# Patient Record
Sex: MALE | Race: OTHER | HISPANIC OR LATINO | Employment: PART TIME | URBAN - NONMETROPOLITAN AREA
[De-identification: names, ages, dates, MRNs, and addresses within clinical notes are randomized per-mention and may not be internally consistent; named-entity substitution may affect disease eponyms.]

---

## 2017-01-04 ENCOUNTER — TRANSCRIBE ORDERS (OUTPATIENT)
Dept: ADMINISTRATIVE | Facility: HOSPITAL | Age: 57
End: 2017-01-04

## 2017-01-04 ENCOUNTER — HOSPITAL ENCOUNTER (OUTPATIENT)
Dept: RADIOLOGY | Facility: HOSPITAL | Age: 57
Discharge: HOME | End: 2017-01-04
Payer: MEDICARE

## 2017-01-04 DIAGNOSIS — M54.50 LOW BACK PAIN: ICD-10-CM

## 2017-01-04 PROCEDURE — 72114 X-RAY EXAM L-S SPINE BENDING: CPT

## 2017-01-06 ENCOUNTER — ALLSCRIPTS OFFICE VISIT (OUTPATIENT)
Dept: OTHER | Facility: OTHER | Age: 57
End: 2017-01-06

## 2017-01-10 ENCOUNTER — HOSPITAL ENCOUNTER (OUTPATIENT)
Dept: SLEEP CENTER | Facility: HOSPITAL | Age: 57
Discharge: HOME | End: 2017-01-10
Payer: MEDICARE

## 2017-01-10 ENCOUNTER — GENERIC CONVERSION - ENCOUNTER (OUTPATIENT)
Dept: OTHER | Facility: OTHER | Age: 57
End: 2017-01-10

## 2017-01-12 ENCOUNTER — APPOINTMENT (OUTPATIENT)
Dept: PHYSICAL THERAPY | Facility: HOME HEALTHCARE | Age: 57
End: 2017-01-12
Payer: MEDICARE

## 2017-01-12 PROCEDURE — G8978 MOBILITY CURRENT STATUS: HCPCS

## 2017-01-12 PROCEDURE — 97161 PT EVAL LOW COMPLEX 20 MIN: CPT

## 2017-01-12 PROCEDURE — 97110 THERAPEUTIC EXERCISES: CPT

## 2017-01-12 PROCEDURE — 97535 SELF CARE MNGMENT TRAINING: CPT

## 2017-01-12 PROCEDURE — G8979 MOBILITY GOAL STATUS: HCPCS

## 2017-01-19 ENCOUNTER — APPOINTMENT (OUTPATIENT)
Dept: PHYSICAL THERAPY | Facility: HOME HEALTHCARE | Age: 57
End: 2017-01-19
Payer: MEDICARE

## 2017-01-19 PROCEDURE — 97014 ELECTRIC STIMULATION THERAPY: CPT

## 2017-01-19 PROCEDURE — 97110 THERAPEUTIC EXERCISES: CPT

## 2017-01-24 ENCOUNTER — APPOINTMENT (OUTPATIENT)
Dept: PHYSICAL THERAPY | Facility: HOME HEALTHCARE | Age: 57
End: 2017-01-24
Payer: MEDICARE

## 2017-01-24 PROCEDURE — 97110 THERAPEUTIC EXERCISES: CPT

## 2017-01-24 PROCEDURE — 97014 ELECTRIC STIMULATION THERAPY: CPT

## 2017-01-25 ENCOUNTER — ALLSCRIPTS OFFICE VISIT (OUTPATIENT)
Dept: FAMILY MEDICINE CLINIC | Facility: CLINIC | Age: 57
End: 2017-01-25
Payer: MEDICARE

## 2017-01-25 PROCEDURE — 99213 OFFICE O/P EST LOW 20 MIN: CPT | Performed by: PHYSICIAN ASSISTANT

## 2017-01-31 ENCOUNTER — APPOINTMENT (OUTPATIENT)
Dept: PHYSICAL THERAPY | Facility: HOME HEALTHCARE | Age: 57
End: 2017-01-31
Payer: MEDICARE

## 2017-01-31 PROCEDURE — 97140 MANUAL THERAPY 1/> REGIONS: CPT

## 2017-01-31 PROCEDURE — 97110 THERAPEUTIC EXERCISES: CPT

## 2017-01-31 PROCEDURE — 97014 ELECTRIC STIMULATION THERAPY: CPT

## 2017-02-02 ENCOUNTER — APPOINTMENT (OUTPATIENT)
Dept: PHYSICAL THERAPY | Facility: HOME HEALTHCARE | Age: 57
End: 2017-02-02
Payer: MEDICARE

## 2017-02-02 PROCEDURE — 97014 ELECTRIC STIMULATION THERAPY: CPT

## 2017-02-02 PROCEDURE — 97110 THERAPEUTIC EXERCISES: CPT

## 2017-02-02 PROCEDURE — 97140 MANUAL THERAPY 1/> REGIONS: CPT

## 2017-02-08 ENCOUNTER — APPOINTMENT (OUTPATIENT)
Dept: PHYSICAL THERAPY | Facility: HOME HEALTHCARE | Age: 57
End: 2017-02-08
Payer: MEDICARE

## 2017-02-08 PROCEDURE — 97110 THERAPEUTIC EXERCISES: CPT

## 2017-02-08 PROCEDURE — 97140 MANUAL THERAPY 1/> REGIONS: CPT

## 2017-02-08 PROCEDURE — 97014 ELECTRIC STIMULATION THERAPY: CPT

## 2017-02-09 ENCOUNTER — APPOINTMENT (OUTPATIENT)
Dept: PHYSICAL THERAPY | Facility: HOME HEALTHCARE | Age: 57
End: 2017-02-09
Payer: MEDICARE

## 2017-02-10 ENCOUNTER — GENERIC CONVERSION - ENCOUNTER (OUTPATIENT)
Dept: OTHER | Facility: OTHER | Age: 57
End: 2017-02-10

## 2017-02-10 ENCOUNTER — APPOINTMENT (OUTPATIENT)
Dept: PHYSICAL THERAPY | Facility: HOME HEALTHCARE | Age: 57
End: 2017-02-10
Payer: MEDICARE

## 2017-02-10 PROCEDURE — 97110 THERAPEUTIC EXERCISES: CPT

## 2017-02-10 PROCEDURE — 97164 PT RE-EVAL EST PLAN CARE: CPT

## 2017-02-10 PROCEDURE — G8979 MOBILITY GOAL STATUS: HCPCS

## 2017-02-10 PROCEDURE — G8978 MOBILITY CURRENT STATUS: HCPCS

## 2017-02-10 PROCEDURE — 97014 ELECTRIC STIMULATION THERAPY: CPT

## 2017-02-17 ENCOUNTER — ALLSCRIPTS OFFICE VISIT (OUTPATIENT)
Dept: OTHER | Facility: OTHER | Age: 57
End: 2017-02-17

## 2017-02-21 ENCOUNTER — GENERIC CONVERSION - ENCOUNTER (OUTPATIENT)
Dept: OTHER | Facility: OTHER | Age: 57
End: 2017-02-21

## 2017-02-22 ENCOUNTER — ALLSCRIPTS OFFICE VISIT (OUTPATIENT)
Dept: FAMILY MEDICINE CLINIC | Facility: CLINIC | Age: 57
End: 2017-02-22
Payer: MEDICARE

## 2017-02-22 PROCEDURE — 99213 OFFICE O/P EST LOW 20 MIN: CPT | Performed by: PHYSICIAN ASSISTANT

## 2017-03-10 ENCOUNTER — ALLSCRIPTS OFFICE VISIT (OUTPATIENT)
Dept: OTHER | Facility: OTHER | Age: 57
End: 2017-03-10

## 2017-03-16 ENCOUNTER — GENERIC CONVERSION - ENCOUNTER (OUTPATIENT)
Dept: OTHER | Facility: OTHER | Age: 57
End: 2017-03-16

## 2017-03-22 ENCOUNTER — ALLSCRIPTS OFFICE VISIT (OUTPATIENT)
Dept: FAMILY MEDICINE CLINIC | Facility: CLINIC | Age: 57
End: 2017-03-22
Payer: MEDICARE

## 2017-03-22 DIAGNOSIS — S32.000A CLOSED WEDGE COMPRESSION FRACTURE OF LUMBAR VERTEBRA (HCC): ICD-10-CM

## 2017-03-22 DIAGNOSIS — E55.9 VITAMIN D DEFICIENCY: ICD-10-CM

## 2017-03-22 DIAGNOSIS — M51.36 OTHER INTERVERTEBRAL DISC DEGENERATION, LUMBAR REGION: ICD-10-CM

## 2017-03-22 PROCEDURE — 99213 OFFICE O/P EST LOW 20 MIN: CPT | Performed by: PHYSICIAN ASSISTANT

## 2017-03-28 ENCOUNTER — HOSPITAL ENCOUNTER (OUTPATIENT)
Dept: BONE DENSITY | Facility: HOSPITAL | Age: 57
Discharge: HOME | End: 2017-03-28
Payer: MEDICARE

## 2017-03-28 ENCOUNTER — APPOINTMENT (OUTPATIENT)
Dept: LAB | Facility: HOSPITAL | Age: 57
End: 2017-03-28
Payer: MEDICARE

## 2017-03-28 ENCOUNTER — TRANSCRIBE ORDERS (OUTPATIENT)
Dept: ADMINISTRATIVE | Facility: HOSPITAL | Age: 57
End: 2017-03-28

## 2017-03-28 DIAGNOSIS — M51.36 OTHER INTERVERTEBRAL DISC DEGENERATION, LUMBAR REGION: ICD-10-CM

## 2017-03-28 DIAGNOSIS — E55.9 VITAMIN D DEFICIENCY: ICD-10-CM

## 2017-03-28 DIAGNOSIS — S32.000A CLOSED WEDGE COMPRESSION FRACTURE OF LUMBAR VERTEBRA (HCC): ICD-10-CM

## 2017-03-28 LAB — 25(OH)D3 SERPL-MCNC: 16.7 NG/ML (ref 30–100)

## 2017-03-28 PROCEDURE — 82306 VITAMIN D 25 HYDROXY: CPT

## 2017-03-28 PROCEDURE — 36415 COLL VENOUS BLD VENIPUNCTURE: CPT

## 2017-03-28 PROCEDURE — 77080 DXA BONE DENSITY AXIAL: CPT

## 2017-04-20 ENCOUNTER — GENERIC CONVERSION - ENCOUNTER (OUTPATIENT)
Dept: OTHER | Facility: OTHER | Age: 57
End: 2017-04-20

## 2017-05-12 ENCOUNTER — ALLSCRIPTS OFFICE VISIT (OUTPATIENT)
Dept: OTHER | Facility: OTHER | Age: 57
End: 2017-05-12

## 2017-05-12 DIAGNOSIS — I10 ESSENTIAL (PRIMARY) HYPERTENSION: ICD-10-CM

## 2017-05-12 DIAGNOSIS — I51.9 HEART DISEASE: ICD-10-CM

## 2017-05-12 DIAGNOSIS — I25.10 ATHEROSCLEROTIC HEART DISEASE OF NATIVE CORONARY ARTERY WITHOUT ANGINA PECTORIS: ICD-10-CM

## 2017-05-12 DIAGNOSIS — E78.00 PURE HYPERCHOLESTEROLEMIA: ICD-10-CM

## 2017-05-30 ENCOUNTER — ALLSCRIPTS OFFICE VISIT (OUTPATIENT)
Dept: OTHER | Facility: OTHER | Age: 57
End: 2017-05-30

## 2017-05-31 ENCOUNTER — GENERIC CONVERSION - ENCOUNTER (OUTPATIENT)
Dept: OTHER | Facility: OTHER | Age: 57
End: 2017-05-31

## 2017-06-21 ENCOUNTER — ALLSCRIPTS OFFICE VISIT (OUTPATIENT)
Dept: FAMILY MEDICINE CLINIC | Facility: CLINIC | Age: 57
End: 2017-06-21
Payer: MEDICARE

## 2017-06-21 PROCEDURE — 99213 OFFICE O/P EST LOW 20 MIN: CPT | Performed by: PHYSICIAN ASSISTANT

## 2017-07-24 ENCOUNTER — HOSPITAL ENCOUNTER (OUTPATIENT)
Facility: HOSPITAL | Age: 57
Setting detail: OBSERVATION
Discharge: HOME | End: 2017-07-25
Attending: EMERGENCY MEDICINE | Admitting: FAMILY MEDICINE
Payer: MEDICARE

## 2017-07-24 ENCOUNTER — APPOINTMENT (EMERGENCY)
Dept: CT IMAGING | Facility: HOSPITAL | Age: 57
End: 2017-07-24
Payer: MEDICARE

## 2017-07-24 DIAGNOSIS — K62.5 RECTAL BLEEDING: Primary | ICD-10-CM

## 2017-07-24 PROBLEM — R79.89 ELEVATED LACTIC ACID LEVEL: Status: ACTIVE | Noted: 2017-07-24

## 2017-07-24 LAB
ALBUMIN SERPL BCP-MCNC: 3.1 G/DL (ref 3.5–5)
ALP SERPL-CCNC: 94 U/L (ref 46–116)
ALT SERPL W P-5'-P-CCNC: 26 U/L (ref 12–78)
ANION GAP SERPL CALCULATED.3IONS-SCNC: 10 MMOL/L (ref 4–13)
APTT PPP: 34 SECONDS (ref 23–35)
AST SERPL W P-5'-P-CCNC: 13 U/L (ref 5–45)
BACTERIA UR QL AUTO: ABNORMAL /HPF
BASOPHILS # BLD AUTO: 0.11 THOUSANDS/ΜL (ref 0–0.1)
BASOPHILS NFR BLD AUTO: 1 % (ref 0–1)
BILIRUB SERPL-MCNC: 0.2 MG/DL (ref 0.2–1)
BILIRUB UR QL STRIP: NEGATIVE
BUN SERPL-MCNC: 10 MG/DL (ref 5–25)
CALCIUM SERPL-MCNC: 7.9 MG/DL (ref 8.3–10.1)
CHLORIDE SERPL-SCNC: 106 MMOL/L (ref 100–108)
CLARITY UR: CLEAR
CO2 SERPL-SCNC: 28 MMOL/L (ref 21–32)
COLOR UR: YELLOW
CREAT SERPL-MCNC: 0.91 MG/DL (ref 0.6–1.3)
EOSINOPHIL # BLD AUTO: 0.09 THOUSAND/ΜL (ref 0–0.61)
EOSINOPHIL NFR BLD AUTO: 1 % (ref 0–6)
ERYTHROCYTE [DISTWIDTH] IN BLOOD BY AUTOMATED COUNT: 16 % (ref 11.6–15.1)
GFR SERPL CREATININE-BSD FRML MDRD: 93 ML/MIN/1.73SQ M
GLUCOSE SERPL-MCNC: 97 MG/DL (ref 65–140)
GLUCOSE UR STRIP-MCNC: NEGATIVE MG/DL
HCT VFR BLD AUTO: 45.2 % (ref 36.5–49.3)
HCT VFR BLD AUTO: 47.8 % (ref 36.5–49.3)
HGB BLD-MCNC: 14.7 G/DL (ref 12–17)
HGB BLD-MCNC: 15.5 G/DL (ref 12–17)
HGB UR QL STRIP.AUTO: NORMAL
INR PPP: 1.16 (ref 0.86–1.16)
KETONES UR STRIP-MCNC: NEGATIVE MG/DL
LACTATE SERPL-SCNC: 2.3 MMOL/L (ref 0.5–2)
LACTATE SERPL-SCNC: 2.5 MMOL/L (ref 0.5–2)
LEUKOCYTE ESTERASE UR QL STRIP: NEGATIVE
LIPASE SERPL-CCNC: 51 U/L (ref 73–393)
LYMPHOCYTES # BLD AUTO: 1.84 THOUSANDS/ΜL (ref 0.6–4.47)
LYMPHOCYTES NFR BLD AUTO: 24 % (ref 14–44)
MAGNESIUM SERPL-MCNC: 2 MG/DL (ref 1.6–2.6)
MCH RBC QN AUTO: 27.7 PG (ref 26.8–34.3)
MCHC RBC AUTO-ENTMCNC: 32.5 G/DL (ref 31.4–37.4)
MCV RBC AUTO: 85 FL (ref 82–98)
MONOCYTES # BLD AUTO: 0.85 THOUSAND/ΜL (ref 0.17–1.22)
MONOCYTES NFR BLD AUTO: 11 % (ref 4–12)
NEUTROPHILS # BLD AUTO: 4.8 THOUSANDS/ΜL (ref 1.85–7.62)
NEUTS SEG NFR BLD AUTO: 63 % (ref 43–75)
NITRITE UR QL STRIP: NEGATIVE
NON-SQ EPI CELLS URNS QL MICRO: ABNORMAL /HPF
PH UR STRIP.AUTO: 5 [PH] (ref 4.5–8)
PLATELET # BLD AUTO: 241 THOUSANDS/UL (ref 149–390)
PMV BLD AUTO: 10.3 FL (ref 8.9–12.7)
POTASSIUM SERPL-SCNC: 3.6 MMOL/L (ref 3.5–5.3)
PROT SERPL-MCNC: 7.2 G/DL (ref 6.4–8.2)
PROT UR STRIP-MCNC: NEGATIVE MG/DL
PROTHROMBIN TIME: 14.7 SECONDS (ref 12.1–14.4)
RBC # BLD AUTO: 5.31 MILLION/UL (ref 3.88–5.62)
RBC #/AREA URNS AUTO: ABNORMAL /HPF
SODIUM SERPL-SCNC: 144 MMOL/L (ref 136–145)
SP GR UR STRIP.AUTO: <=1.005 (ref 1–1.03)
TROPONIN I SERPL-MCNC: <0.02 NG/ML
UROBILINOGEN UR QL STRIP.AUTO: 0.2 E.U./DL
WBC # BLD AUTO: 7.69 THOUSAND/UL (ref 4.31–10.16)
WBC #/AREA URNS AUTO: ABNORMAL /HPF

## 2017-07-24 PROCEDURE — 83690 ASSAY OF LIPASE: CPT | Performed by: EMERGENCY MEDICINE

## 2017-07-24 PROCEDURE — 85018 HEMOGLOBIN: CPT | Performed by: INTERNAL MEDICINE

## 2017-07-24 PROCEDURE — 96360 HYDRATION IV INFUSION INIT: CPT

## 2017-07-24 PROCEDURE — 83605 ASSAY OF LACTIC ACID: CPT | Performed by: EMERGENCY MEDICINE

## 2017-07-24 PROCEDURE — 85730 THROMBOPLASTIN TIME PARTIAL: CPT | Performed by: EMERGENCY MEDICINE

## 2017-07-24 PROCEDURE — 93005 ELECTROCARDIOGRAM TRACING: CPT | Performed by: EMERGENCY MEDICINE

## 2017-07-24 PROCEDURE — 80053 COMPREHEN METABOLIC PANEL: CPT | Performed by: EMERGENCY MEDICINE

## 2017-07-24 PROCEDURE — 84484 ASSAY OF TROPONIN QUANT: CPT | Performed by: EMERGENCY MEDICINE

## 2017-07-24 PROCEDURE — 83735 ASSAY OF MAGNESIUM: CPT | Performed by: EMERGENCY MEDICINE

## 2017-07-24 PROCEDURE — 96361 HYDRATE IV INFUSION ADD-ON: CPT

## 2017-07-24 PROCEDURE — 83605 ASSAY OF LACTIC ACID: CPT | Performed by: INTERNAL MEDICINE

## 2017-07-24 PROCEDURE — 85014 HEMATOCRIT: CPT | Performed by: INTERNAL MEDICINE

## 2017-07-24 PROCEDURE — 81001 URINALYSIS AUTO W/SCOPE: CPT | Performed by: EMERGENCY MEDICINE

## 2017-07-24 PROCEDURE — 99285 EMERGENCY DEPT VISIT HI MDM: CPT

## 2017-07-24 PROCEDURE — 82272 OCCULT BLD FECES 1-3 TESTS: CPT

## 2017-07-24 PROCEDURE — 85610 PROTHROMBIN TIME: CPT | Performed by: EMERGENCY MEDICINE

## 2017-07-24 PROCEDURE — 85025 COMPLETE CBC W/AUTO DIFF WBC: CPT | Performed by: EMERGENCY MEDICINE

## 2017-07-24 PROCEDURE — 74178 CT ABD&PLV WO CNTR FLWD CNTR: CPT

## 2017-07-24 RX ORDER — ONDANSETRON 2 MG/ML
4 INJECTION INTRAMUSCULAR; INTRAVENOUS EVERY 4 HOURS PRN
Status: DISCONTINUED | OUTPATIENT
Start: 2017-07-24 | End: 2017-07-25 | Stop reason: HOSPADM

## 2017-07-24 RX ORDER — LISINOPRIL 20 MG/1
40 TABLET ORAL DAILY
Status: DISCONTINUED | OUTPATIENT
Start: 2017-07-25 | End: 2017-07-25 | Stop reason: HOSPADM

## 2017-07-24 RX ORDER — METOPROLOL TARTRATE 50 MG/1
50 TABLET, FILM COATED ORAL EVERY 12 HOURS SCHEDULED
Status: DISCONTINUED | OUTPATIENT
Start: 2017-07-24 | End: 2017-07-25 | Stop reason: HOSPADM

## 2017-07-24 RX ORDER — ACETAMINOPHEN 325 MG/1
650 TABLET ORAL EVERY 6 HOURS PRN
Status: DISCONTINUED | OUTPATIENT
Start: 2017-07-24 | End: 2017-07-25 | Stop reason: HOSPADM

## 2017-07-24 RX ORDER — METHOCARBAMOL 500 MG/1
500 TABLET, FILM COATED ORAL 2 TIMES DAILY
COMMUNITY
End: 2017-12-27

## 2017-07-24 RX ORDER — FUROSEMIDE 20 MG/1
20 TABLET ORAL
Status: DISCONTINUED | OUTPATIENT
Start: 2017-07-25 | End: 2017-07-24

## 2017-07-24 RX ORDER — PRAVASTATIN SODIUM 40 MG
80 TABLET ORAL
Status: DISCONTINUED | OUTPATIENT
Start: 2017-07-24 | End: 2017-07-25 | Stop reason: HOSPADM

## 2017-07-24 RX ORDER — ZOLPIDEM TARTRATE 5 MG/1
10 TABLET ORAL
Status: DISCONTINUED | OUTPATIENT
Start: 2017-07-24 | End: 2017-07-25 | Stop reason: HOSPADM

## 2017-07-24 RX ORDER — MORPHINE SULFATE 2 MG/ML
2 INJECTION, SOLUTION INTRAMUSCULAR; INTRAVENOUS EVERY 4 HOURS PRN
Status: DISCONTINUED | OUTPATIENT
Start: 2017-07-24 | End: 2017-07-25 | Stop reason: HOSPADM

## 2017-07-24 RX ORDER — METHOCARBAMOL 500 MG/1
500 TABLET, FILM COATED ORAL 4 TIMES DAILY
Status: DISCONTINUED | OUTPATIENT
Start: 2017-07-24 | End: 2017-07-25 | Stop reason: HOSPADM

## 2017-07-24 RX ORDER — HYDRALAZINE HYDROCHLORIDE 20 MG/ML
10 INJECTION INTRAMUSCULAR; INTRAVENOUS EVERY 4 HOURS PRN
Status: DISCONTINUED | OUTPATIENT
Start: 2017-07-24 | End: 2017-07-25 | Stop reason: HOSPADM

## 2017-07-24 RX ORDER — NICOTINE 21 MG/24HR
14 PATCH, TRANSDERMAL 24 HOURS TRANSDERMAL DAILY
Status: DISCONTINUED | OUTPATIENT
Start: 2017-07-24 | End: 2017-07-25 | Stop reason: HOSPADM

## 2017-07-24 RX ORDER — POTASSIUM CHLORIDE 750 MG/1
10 TABLET, EXTENDED RELEASE ORAL
Status: DISCONTINUED | OUTPATIENT
Start: 2017-07-25 | End: 2017-07-25 | Stop reason: HOSPADM

## 2017-07-24 RX ORDER — OXYCODONE HYDROCHLORIDE AND ACETAMINOPHEN 5; 325 MG/1; MG/1
1 TABLET ORAL EVERY 4 HOURS PRN
Status: DISCONTINUED | OUTPATIENT
Start: 2017-07-24 | End: 2017-07-25 | Stop reason: HOSPADM

## 2017-07-24 RX ORDER — SODIUM CHLORIDE 9 MG/ML
100 INJECTION, SOLUTION INTRAVENOUS CONTINUOUS
Status: DISCONTINUED | OUTPATIENT
Start: 2017-07-24 | End: 2017-07-25

## 2017-07-24 RX ORDER — FUROSEMIDE 20 MG/1
10 TABLET ORAL
Status: DISCONTINUED | OUTPATIENT
Start: 2017-07-24 | End: 2017-07-24

## 2017-07-24 RX ORDER — BUDESONIDE AND FORMOTEROL FUMARATE DIHYDRATE 160; 4.5 UG/1; UG/1
2 AEROSOL RESPIRATORY (INHALATION) DAILY
Status: DISCONTINUED | OUTPATIENT
Start: 2017-07-25 | End: 2017-07-25 | Stop reason: HOSPADM

## 2017-07-24 RX ADMIN — NICOTINE 14 MG: 14 PATCH, EXTENDED RELEASE TRANSDERMAL at 21:19

## 2017-07-24 RX ADMIN — FUROSEMIDE 10 MG: 20 TABLET ORAL at 21:19

## 2017-07-24 RX ADMIN — METOPROLOL TARTRATE 50 MG: 50 TABLET ORAL at 21:19

## 2017-07-24 RX ADMIN — PRAVASTATIN SODIUM 80 MG: 40 TABLET ORAL at 21:19

## 2017-07-24 RX ADMIN — SODIUM CHLORIDE 3390 ML: 0.9 INJECTION, SOLUTION INTRAVENOUS at 17:05

## 2017-07-24 RX ADMIN — IOHEXOL 100 ML: 350 INJECTION, SOLUTION INTRAVENOUS at 17:00

## 2017-07-24 RX ADMIN — ZOLPIDEM TARTRATE 10 MG: 5 TABLET, FILM COATED ORAL at 21:21

## 2017-07-24 RX ADMIN — METHOCARBAMOL 500 MG: 500 TABLET ORAL at 21:19

## 2017-07-25 VITALS
DIASTOLIC BLOOD PRESSURE: 90 MMHG | TEMPERATURE: 97.3 F | WEIGHT: 238.98 LBS | HEIGHT: 62 IN | OXYGEN SATURATION: 96 % | HEART RATE: 72 BPM | SYSTOLIC BLOOD PRESSURE: 172 MMHG | RESPIRATION RATE: 20 BRPM | BODY MASS INDEX: 43.98 KG/M2

## 2017-07-25 PROBLEM — K62.5 RECTAL BLEEDING: Status: RESOLVED | Noted: 2017-07-24 | Resolved: 2017-07-25

## 2017-07-25 PROBLEM — R79.89 ELEVATED LACTIC ACID LEVEL: Status: RESOLVED | Noted: 2017-07-24 | Resolved: 2017-07-25

## 2017-07-25 LAB
ALBUMIN SERPL BCP-MCNC: 3.2 G/DL (ref 3.5–5)
ALP SERPL-CCNC: 100 U/L (ref 46–116)
ALT SERPL W P-5'-P-CCNC: 36 U/L (ref 12–78)
ANION GAP SERPL CALCULATED.3IONS-SCNC: 10 MMOL/L (ref 4–13)
AST SERPL W P-5'-P-CCNC: 36 U/L (ref 5–45)
ATRIAL RATE: 58 BPM
BILIRUB SERPL-MCNC: 0.5 MG/DL (ref 0.2–1)
BUN SERPL-MCNC: 9 MG/DL (ref 5–25)
CALCIUM SERPL-MCNC: 8.1 MG/DL (ref 8.3–10.1)
CHLORIDE SERPL-SCNC: 100 MMOL/L (ref 100–108)
CO2 SERPL-SCNC: 29 MMOL/L (ref 21–32)
CREAT SERPL-MCNC: 0.98 MG/DL (ref 0.6–1.3)
ERYTHROCYTE [DISTWIDTH] IN BLOOD BY AUTOMATED COUNT: 16 % (ref 11.6–15.1)
GFR SERPL CREATININE-BSD FRML MDRD: 85 ML/MIN/1.73SQ M
GLUCOSE P FAST SERPL-MCNC: 86 MG/DL (ref 65–99)
GLUCOSE SERPL-MCNC: 86 MG/DL (ref 65–140)
HCT VFR BLD AUTO: 47.6 % (ref 36.5–49.3)
HGB BLD-MCNC: 15.5 G/DL (ref 12–17)
LACTATE SERPL-SCNC: 1.3 MMOL/L (ref 0.5–2)
LACTATE SERPL-SCNC: 2.2 MMOL/L (ref 0.5–2)
MCH RBC QN AUTO: 27.7 PG (ref 26.8–34.3)
MCHC RBC AUTO-ENTMCNC: 32.6 G/DL (ref 31.4–37.4)
MCV RBC AUTO: 85 FL (ref 82–98)
P AXIS: 34 DEGREES
PLATELET # BLD AUTO: 222 THOUSANDS/UL (ref 149–390)
PMV BLD AUTO: 10.6 FL (ref 8.9–12.7)
POTASSIUM SERPL-SCNC: 3.7 MMOL/L (ref 3.5–5.3)
PR INTERVAL: 142 MS
PROT SERPL-MCNC: 7.3 G/DL (ref 6.4–8.2)
QRS AXIS: 81 DEGREES
QRSD INTERVAL: 86 MS
QT INTERVAL: 450 MS
QTC INTERVAL: 441 MS
RBC # BLD AUTO: 5.6 MILLION/UL (ref 3.88–5.62)
SODIUM SERPL-SCNC: 139 MMOL/L (ref 136–145)
T WAVE AXIS: 142 DEGREES
VENTRICULAR RATE: 58 BPM
WBC # BLD AUTO: 10.15 THOUSAND/UL (ref 4.31–10.16)

## 2017-07-25 PROCEDURE — 94660 CPAP INITIATION&MGMT: CPT

## 2017-07-25 PROCEDURE — 83605 ASSAY OF LACTIC ACID: CPT | Performed by: INTERNAL MEDICINE

## 2017-07-25 PROCEDURE — 80053 COMPREHEN METABOLIC PANEL: CPT | Performed by: INTERNAL MEDICINE

## 2017-07-25 PROCEDURE — 94760 N-INVAS EAR/PLS OXIMETRY 1: CPT

## 2017-07-25 PROCEDURE — 85027 COMPLETE CBC AUTOMATED: CPT | Performed by: INTERNAL MEDICINE

## 2017-07-25 RX ADMIN — NICOTINE 14 MG: 14 PATCH, EXTENDED RELEASE TRANSDERMAL at 09:07

## 2017-07-25 RX ADMIN — BUDESONIDE AND FORMOTEROL FUMARATE DIHYDRATE 2 PUFF: 160; 4.5 AEROSOL RESPIRATORY (INHALATION) at 09:07

## 2017-07-25 RX ADMIN — LISINOPRIL 40 MG: 20 TABLET ORAL at 09:07

## 2017-07-25 RX ADMIN — HYDRALAZINE HYDROCHLORIDE 10 MG: 20 INJECTION INTRAMUSCULAR; INTRAVENOUS at 00:47

## 2017-07-25 RX ADMIN — HYDRALAZINE HYDROCHLORIDE 10 MG: 20 INJECTION INTRAMUSCULAR; INTRAVENOUS at 11:56

## 2017-07-25 RX ADMIN — METOPROLOL TARTRATE 50 MG: 50 TABLET ORAL at 09:07

## 2017-07-25 RX ADMIN — METHOCARBAMOL 500 MG: 500 TABLET ORAL at 09:07

## 2017-07-25 RX ADMIN — SODIUM CHLORIDE 1000 ML: 0.9 INJECTION, SOLUTION INTRAVENOUS at 07:48

## 2017-07-25 RX ADMIN — POTASSIUM CHLORIDE 10 MEQ: 750 TABLET, EXTENDED RELEASE ORAL at 09:07

## 2017-07-25 RX ADMIN — METHOCARBAMOL 500 MG: 500 TABLET ORAL at 11:56

## 2017-07-25 RX ADMIN — SODIUM CHLORIDE 75 ML/HR: 0.9 INJECTION, SOLUTION INTRAVENOUS at 00:35

## 2017-08-10 ENCOUNTER — APPOINTMENT (OUTPATIENT)
Dept: FAMILY MEDICINE CLINIC | Facility: CLINIC | Age: 57
End: 2017-08-10
Payer: MEDICARE

## 2017-08-10 ENCOUNTER — GENERIC CONVERSION - ENCOUNTER (OUTPATIENT)
Dept: OTHER | Facility: OTHER | Age: 57
End: 2017-08-10

## 2017-08-10 PROCEDURE — 99213 OFFICE O/P EST LOW 20 MIN: CPT | Performed by: FAMILY MEDICINE

## 2017-08-16 ENCOUNTER — ALLSCRIPTS OFFICE VISIT (OUTPATIENT)
Dept: OTHER | Facility: OTHER | Age: 57
End: 2017-08-16

## 2017-09-22 ENCOUNTER — ANESTHESIA EVENT (OUTPATIENT)
Dept: PERIOP | Facility: HOSPITAL | Age: 57
End: 2017-09-22
Payer: MEDICARE

## 2017-10-04 RX ORDER — GABAPENTIN 400 MG/1
100 CAPSULE ORAL 3 TIMES DAILY
Status: ON HOLD | COMMUNITY
End: 2017-10-06

## 2017-10-04 RX ORDER — DICLOFENAC POTASSIUM 50 MG/1
50 TABLET, FILM COATED ORAL 3 TIMES DAILY
Status: ON HOLD | COMMUNITY
End: 2017-10-06

## 2017-10-06 ENCOUNTER — ANESTHESIA (OUTPATIENT)
Dept: PERIOP | Facility: HOSPITAL | Age: 57
End: 2017-10-06
Payer: MEDICARE

## 2017-10-06 ENCOUNTER — GENERIC CONVERSION - ENCOUNTER (OUTPATIENT)
Dept: OTHER | Facility: OTHER | Age: 57
End: 2017-10-06

## 2017-10-06 ENCOUNTER — HOSPITAL ENCOUNTER (OUTPATIENT)
Facility: HOSPITAL | Age: 57
Setting detail: OUTPATIENT SURGERY
Discharge: HOME | End: 2017-10-06
Attending: INTERNAL MEDICINE | Admitting: INTERNAL MEDICINE
Payer: MEDICARE

## 2017-10-06 VITALS
RESPIRATION RATE: 18 BRPM | DIASTOLIC BLOOD PRESSURE: 72 MMHG | SYSTOLIC BLOOD PRESSURE: 145 MMHG | HEIGHT: 62 IN | WEIGHT: 247 LBS | HEART RATE: 54 BPM | OXYGEN SATURATION: 99 % | TEMPERATURE: 96.8 F | BODY MASS INDEX: 45.45 KG/M2

## 2017-10-06 DIAGNOSIS — K62.5 HEMORRHAGE OF ANUS AND RECTUM: ICD-10-CM

## 2017-10-06 PROCEDURE — 88305 TISSUE EXAM BY PATHOLOGIST: CPT | Performed by: INTERNAL MEDICINE

## 2017-10-06 RX ORDER — ONDANSETRON 2 MG/ML
4 INJECTION INTRAMUSCULAR; INTRAVENOUS ONCE AS NEEDED
Status: DISCONTINUED | OUTPATIENT
Start: 2017-10-06 | End: 2017-10-06 | Stop reason: HOSPADM

## 2017-10-06 RX ORDER — PROPOFOL 10 MG/ML
INJECTION, EMULSION INTRAVENOUS AS NEEDED
Status: DISCONTINUED | OUTPATIENT
Start: 2017-10-06 | End: 2017-10-06 | Stop reason: SURG

## 2017-10-06 RX ORDER — SODIUM CHLORIDE, SODIUM LACTATE, POTASSIUM CHLORIDE, CALCIUM CHLORIDE 600; 310; 30; 20 MG/100ML; MG/100ML; MG/100ML; MG/100ML
125 INJECTION, SOLUTION INTRAVENOUS CONTINUOUS
Status: DISCONTINUED | OUTPATIENT
Start: 2017-10-06 | End: 2017-10-06

## 2017-10-06 RX ADMIN — PROPOFOL 50 MG: 10 INJECTION, EMULSION INTRAVENOUS at 12:26

## 2017-10-06 RX ADMIN — PROPOFOL 50 MG: 10 INJECTION, EMULSION INTRAVENOUS at 12:16

## 2017-10-06 RX ADMIN — LIDOCAINE HYDROCHLORIDE 20 MG: 20 INJECTION, SOLUTION INTRAVENOUS at 12:16

## 2017-10-06 RX ADMIN — PROPOFOL 50 MG: 10 INJECTION, EMULSION INTRAVENOUS at 12:20

## 2017-10-06 RX ADMIN — PROPOFOL 50 MG: 10 INJECTION, EMULSION INTRAVENOUS at 12:17

## 2017-10-06 RX ADMIN — PROPOFOL 50 MG: 10 INJECTION, EMULSION INTRAVENOUS at 12:30

## 2017-10-06 RX ADMIN — SODIUM CHLORIDE, SODIUM LACTATE, POTASSIUM CHLORIDE, AND CALCIUM CHLORIDE 125 ML/HR: .6; .31; .03; .02 INJECTION, SOLUTION INTRAVENOUS at 11:55

## 2017-10-06 RX ADMIN — PROPOFOL 50 MG: 10 INJECTION, EMULSION INTRAVENOUS at 12:19

## 2017-10-06 RX ADMIN — PROPOFOL 50 MG: 10 INJECTION, EMULSION INTRAVENOUS at 12:23

## 2017-10-06 NOTE — H&P
Progress Note - Juan Campbell 62 y o  male MRN: [id number]    Unit/Bed#: OR POOL Encounter: [id number]        HPI    Rectal bleeding    Objective:     Vitals: Blood pressure 134/78, pulse 58, temperature 100 °F (37 8 °C), temperature source Tympanic, resp  rate 18, height 5' 2" (1 575 m), weight 112 kg (247 lb), SpO2 98 %  ,Body mass index is 45 18 kg/m²  No intake or output data in the 24 hours ending 10/06/17 1214    Physical Exam:     General Appearance: Alert, appears stated age and cooperative  Lungs: Clear to auscultation bilaterally, no rales or rhonchi, no labored breathing/accessory muscle use  Heart: Regular rate and rhythm, S1, S2 normal, no murmur, click, rub or gallop  Abdomen: Soft, non-tender, non-distended; bowel sounds normal; no masses or no organomegaly  Extremities: No cyanosis, edema    Invasive Devices     Peripheral Intravenous Line            Peripheral IV 10/06/17 Left Hand less than 1 day                Lab Results:              Invalid input(s): LABALBU            Imaging Studies: I have personally reviewed pertinent imaging studies  No results found  ASSESMENT/ PLAN:   Active Problems:    * No active hospital problems   *      Colonoscopy

## 2017-10-06 NOTE — OP NOTE
**** GI/ENDOSCOPY REPORT ****     PATIENT NAME: Gm Lewis ------ VISIT ID:  Patient ID:   [id number] YOB: 1960     INTRODUCTION: Colonoscopy - A 62 male patient presents for an outpatient   Colonoscopy at [facility]  PREVIOUS COLONOSCOPY:     INDICATIONS: Bleeding  CONSENT:  The benefits, risks, and alternatives to the procedure were   discussed and informed consent was obtained from the patient  PREPARATION: EKG, pulse, pulse oximetry and blood pressure were monitored   throughout the procedure  The patient was identified by myself both   verbally and by visual inspection of ID band  ASA Classification: Class 2   - Patient has mild to moderate systemic disturbance that may or may not be   related to the disorder requiring surgery  Airway Assessment   Classification: Airway class 2 - Visualization of the soft palate, fauces   and uvula  MEDICATIONS: MAC anesthesia  PROCEDURE:  The endoscope was passed without difficulty through the anus   under direct visualization and advanced to the cecum, confirmed by   ileocecal valve and appendiceal orifice  The scope was withdrawn and the   mucosa was carefully examined  The quality of the preparation was fair  Cecal Intubation Time: 8 Minute(s) Scope Withdrawal Time: 9 Minute(s)     RECTAL EXAM: Normal rectal exam      FINDINGS:  A single diminutive flat polyp was found in the sigmoid colon  The polyp was completely removed by cold biopsy polypectomy  There was   evidence of diverticulosis in the sigmoid colon  Diminutive internal   hemorrhoids were found  COMPLICATIONS: There were no complications  IMPRESSIONS: A single diminutive flat polyp found in the sigmoid colon;   removed by cold biopsy polypectomy  Diverticulosis found in the sigmoid   colon  Internal hemorrhoids  RECOMMENDATIONS: Follow-up on the results of the biopsy specimens       PATHOLOGY SPECIMENS: Completely removed by cold biopsy polypectomy  ESTIMATED BLOOD LOSS:     PROCEDURE CODES:     ICD-9 Codes: 578 9 Hemorrhage of gastrointestinal tract, unspecified 211 3   Benign neoplasm of colon 562 10 Diverticulosis of colon (without mention   of hemorrhage)     ICD-10 Codes: K92 2 Gastrointestinal hemorrhage, unspecified K63 5 Polyp   of colon K57 Diverticular disease of intestine K64 9 Unspecified   hemorrhoids     PERFORMED BY: RUIZ Gilbert  on 10/06/2017  Version 1, electronically signed by RUIZ Lopez  on 10/06/2017 at   12:43

## 2017-10-06 NOTE — ANESTHESIA PREPROCEDURE EVALUATION
Review of Systems/Medical History  Patient summary reviewed  Chart reviewed      Cardiovascular  Exercise tolerance: good, Exercise comment: Walks 4 miles daily  Hyperlipidemia, Hypertension controlled, CHF ,    Pulmonary  COPD , Sleep apnea CPAP, ,        GI/Hepatic    No GERD ,          Comment: H/o colon polyps       Endo/Other     GYN       Hematology  Negative hematology ROS      Musculoskeletal  Obesity ,        Neurology  Negative neurology ROS      Psychology           Physical Exam    Airway  Comment: Short thick neck  Mallampati score: III  TM Distance: >3 FB  Neck ROM: limited     Dental   implants,     Cardiovascular  Rhythm: regular, Rate: normal,     Pulmonary  Breath sounds clear to auscultation,     Other Findings        Anesthesia Plan  ASA Score- 3       Anesthesia Type- IV sedation with anesthesia  Comment:     COPD (chronic obstructive pulmonary disease) (HCC)   Hypertension   CAD (coronary artery disease)   HLD (hyperlipidemia)   Chronic back pain   Chronic diastolic CHF (congestive heart failure) (HCC)   ALEXYS (obstructive sleep apnea)   Shortness of breath   Vitamin D deficiency           Induction-       Informed Consent  Anesthetic plan and risks discussed with patient

## 2017-10-06 NOTE — ANESTHESIA POSTPROCEDURE EVALUATION
Post-Op Assessment Note      CV Status:  Stable    Mental Status:  Somnolent    Hydration Status:  Stable    PONV Controlled:  None    Airway Patency:  Patent    Post Op Vitals Reviewed: Yes          Staff: CRNA           BP   122/67   Temp  99 1    Pulse  65   Resp   16   SpO2   100%

## 2017-10-17 ENCOUNTER — GENERIC CONVERSION - ENCOUNTER (OUTPATIENT)
Dept: OTHER | Facility: OTHER | Age: 57
End: 2017-10-17

## 2017-10-26 NOTE — PROGRESS NOTES
Assessment  1  Persistent disorder of initiating or maintaining sleep (307 42) (G47 00)    Plan   Persistent disorder of initiating or maintaining sleep    · Follow-up visit in 2 months Evaluation and Treatment  Follow-up  Status: Hold For -  Scheduling  Requested for: 21Jun2017    Spiriva HandiHaler 18 MCG Inhalation Capsule; INHALE CONTENTS OF 1 CAPSULE ONCE DAILY; Therapy: 17RER3483 to (Evaluate:65Hrs5145)  Requested for: 21Jun2017; Last MB:02SZD2278; Status: ACTIVE Ordered  Rx By: Melo Arias; Dispense: 90 Days ; #:1 X 90 Capsule Box; Refill: 1;   For: Chronic obstructive pulmonary disease; SARKIS = N; Verified Transmission to [facility]; Last Updated By: System, [employer]; 8/25/2017 8:13:14 AM  Zolpidem Tartrate 5 MG Oral Tablet; TAKE 1 TABLET AT  BEDTIME AS NEEDED FOR INSOMNIA; Therapy: 39UBW3477 to (Evaluate:28Fyq0885); Last LI:45YOL2536; Status: ACTIVE Ordered  Rx By: Melo Arias; Dispense: 28 Days ; #:28 Tablet; Refill: 1;   For: Bilateral low back pain without sciatica, Persistent disorder of initiating or maintaining sleep; SARKIS = N; Print Rx     Discussion/Summary    Continue current meds  Increase water intake  He will monitor weight at home and [employer] diary  RTC 2 months  Chief Complaint  Patient is here for regular check up no sure if he needs refills  History of Present Illness  63 yo male presents for above  He is doing well  He has gained 13 lbs since last visit  He feels bloated and feels like his legs swell  He denies any change in appetite  No SOB or cp/press and palpitations  back pain is mostly resolved  Review of Systems    Constitutional: as noted in HPI  Eyes: No complaints of eye pain, no red eyes, no discharge from eyes, no itchy eyes  ENT: no complaints of earache, no hearing loss, no nosebleeds, no nasal discharge, no sore throat, no hoarseness     Cardiovascular: No complaints of slow heart rate, no fast heart rate, no chest pain, no palpitations, no leg claudication, no lower extremity  Respiratory: No complaints of shortness of breath, no wheezing, no cough, no SOB on exertion, no orthopnea or PND  Gastrointestinal: No complaints of abdominal pain, no constipation, no nausea or vomiting, no diarrhea or bloody stools  Genitourinary: No complaints of dysuria, no incontinence, no hesitancy, no nocturia, no genital lesion, no testicular pain  Musculoskeletal: as noted in HPI  Active Problems   1  Arteriosclerotic coronary artery disease (414 00) (I25 10)   2  Asthma (493 90) (J45 909)   3  Bilateral low back pain without sciatica (724 2) (M54 5)   4  Chronic obstructive pulmonary disease (496) (J44 9)   5  Diastolic dysfunction (862 4) (I51 9)   6  Disc degeneration, lumbar (722 52) (M51 36)   7  Elevated blood sugar (790 29) (R73 9)   8  Encounter for screening for malignant neoplasm of colon (V76 51) (Z12 11)   9  Erectile dysfunction (607 84) (N52 9)   10  Eustachian tube dysfunction (381 81) (H69 80)   11  Heart burn (787 1) (R12)   12  Hypercholesterolemia (272 0) (E78 00)   13  Lumbar compression fracture (805 4) (S32 000A)   14  Lumbar radiculopathy (724 4) (M54 16)   15  Lumbar spondylosis (721 3) (M47 816)   16  Morbid or severe obesity due to excess calories (278 01) (E66 01)   17  Need for influenza vaccination (V04 81) (Z23)   18  Nonalcoholic fatty liver disease (571 8) (K76 0)   19  Obstructive sleep apnea (327 23) (G47 33)   20  Osteoporosis without current pathological fracture, unspecified osteoporosis type    (733 00) (M81 0)   21  Oxycodone use disorder, 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)   22  Persistent disorder of initiating or maintaining sleep (307 42) (G47 00)   23  Pulmonary hypertension (416 8) (I27 2)   24  Pulmonary nodule seen on imaging study (793 11) (R91 1)   25  Rectal bleeding (569 3) (K62 5)   26  Sacroiliitis (720 2) (M46 1)   27  Screening for diabetes mellitus (DM) (V77 1) (Z13 1)   28   Seborrheic dermatitis of scalp (690 18) (L21 9)   29  Shortness of breath (786 05) (R06 02)   30  Sprain or strain of hamstring muscle (843 8)   31  Tobacco use (305 1) (Z72 0)   32  Visit for pre-operative examination (V72 84) (Z01 818)   33  Visual impairment in both eyes (369 3) (H54 3)   34  Vitamin D deficiency (268 9) (E55 9)    Hypertension (401 9) (I10)       Chest pain (786 50) (R07 9)       Acute pain due to trauma (338 11) (G89 11)       Exposure to varicella (V01 71) (Z20 820)       Acute low back pain (724 2) (M54 5)       Scalp lesion (709 9) (L98 9)       Traumatic compression fracture of T4 thoracic vertebra (805 2) (S22 040A)       Occult blood positive stool (792 1) (R19 5)       Diarrhea (787 91) (R19 7)          Past Medical History  1  History of Acute upper respiratory infection (465 9) (J06 9)   2  History of Bilateral otitis media (382 9) (H66 93)   3  History of Chronic serous otitis media (381 10) (H65 20)   4  History of acne (V13 3) (Z87 2)   5  History of Impacted cerumen of left ear (380 4) (H61 22)   6  Old myocardial infarction (412) (I25 2)   7  Old myocardial infarction (412) (I25 2)   8  History of Wound Bed Appearance Granulated    The active problems and past medical history were reviewed and updated today  Surgical History  1  History of Cardiac Cath Procedure Summary   2  History of Denial Of Any Significant Medical History   3  History of Oral Surgery Tooth Extraction   4  History of Tonsillectomy    The surgical history was reviewed and updated today  Family History  Mother    1  Family history of diabetes mellitus (V18 0) (Z83 3)  Family History    2  Family history of Back problem   3  Family history of Diabetes Mellitus (250 00)   4  Family history of cardiac disorder (V17 49) (Z82 49)   5  Family history of cerebrovascular accident (V17 1) (Z82 3)   6  Family history of hypertension (V17 49) (Z82 49)   7  Family history of sleep apnea (V19 8) (Z82 0)   8   Family history of High cholesterol    The family history was reviewed and updated today  Social History   · Being A Social Drinker   · Caffeine Use   · Current Every Day Smoker (305 1)   · Denied: History of Drug Use   · Marital History -    · Tobacco use (305 1) (Z72 0)  The social history was reviewed and updated today  The social history was reviewed and is unchanged  Current Meds   1  Albuterol Sulfate (2 5 MG/3ML) 0 083% Inhalation Nebulization Solution; USE 1 UNIT   DOSE VIA NEBULIZER  4 TIMES A DAY AS NEEDED; Therapy: 76Ffe2790 to (Evaluate:15Jan2016)  Requested for: 07Lxw6158; Last   Rx:17Sep2015 Ordered   2  Aspir-81 81 MG Oral Tablet Delayed Release; TAKE 1 TABLET DAILY; Therapy: 21QKM3346 to (Evaluate:25Jun2017)  Requested for: 11QXT6299; Last   Rx:30Jun2016 Ordered   3  Back Support L/XL Miscellaneous; USE AS DIRECTED; Therapy: 73YPK9541 to (Last Rx:25Feb2016)  Requested for: 25Feb2016 Ordered   4  Diclofenac Potassium 50 MG Oral Tablet; Take 1 tablet bid with meals when necessary; Therapy: 30YTJ1468 to (Evaluate:24Feb2018)  Requested for: 61BZB8681; Last   Rx:30May2017 Ordered   5  Furosemide 20 MG Oral Tablet; Take 1 tablet twice daily  Requested for: 66Rgw7089;   Last Rx:21Feb2017 Ordered   6  Gabapentin 400 MG Oral Capsule; TAKE 1 CAPSULE 3 TIMES DAILY; Therapy: 53NCK9159 to (Evaluate:24Feb2018)  Requested for: 63NLD2425; Last   Rx:30May2017 Ordered   7  Lisinopril 40 MG Oral Tablet; TAKE 1 TABLET DAILY FOR BLOOD PRESSURE; Therapy: 46AHN8036 to (Evaluate:01Feb2018)  Requested for: 54YIJ2521; Last   Rx:06Feb2017 Ordered   8  Metoprolol Tartrate 50 MG Oral Tablet; TAKE 1 TABLET EVERY 12 HOURS DAILY; Therapy: (Recorded:12May2017) to Recorded   9  Oxycodone-Acetaminophen 5-325 MG Oral Tablet; TAKE 1 TABLET  DAILY AS NEEDED   FOR PAIN;   Therapy: 40EWZ2304 to (Evaluate:22Mar2017); Last Rx:22Feb2017 Ordered   10   Potassium Chloride ER 10 MEQ Oral Tablet Extended Release; take 1 tablet by mouth once daily; Therapy: 80XNI1875 to (Evaluate:12Apr2017)  Requested for: 33UCB9648; Last    Rx:14Oct2016 Ordered   11  Pravastatin Sodium 80 MG Oral Tablet; TAKE 1 TABLET DAILY; Therapy: 52RRB5330 to ([facility])  Requested for: 09VAO9229; Last    Rx:03Jse6096 Ordered   12  Spiriva HandiHaler 18 MCG Inhalation Capsule; INHALE CONTENTS OF 1 CAPSULE    ONCE DAILY; Therapy: 06BJI9445 to (Evaluate:66Bag2062)  Requested for: 40Kuc2642; Last    Rx:21Feb2017 Ordered   13  Ventolin  (90 Base) MCG/ACT Inhalation Aerosol Solution; INHALE 2 PUFFS    EVERY 4 HOURS AS NEEDED; Therapy: 12ZHO3857 to (Evaluate:17May2016)  Requested for: 99RRM9467; Last    Rx:18Jan2016 Ordered   14  Vitamin D (Ergocalciferol) 39352 UNIT Oral Capsule; 1 cap 2x weekly for 6 weeks; Therapy: 29UYW0710 to (Evaluate:22Apr2017)  Requested for: 12AGU2951; Last    Rx:29Mar2017 Ordered   15  Zolpidem Tartrate 5 MG Oral Tablet; TAKE 1 TABLET AT BEDTIME AS NEEDED FOR    SLEEP; Therapy: 57UCJ9371 to (Evaluate:14Jun2017); Last Rx:22Mar2017 Ordered    The medication list was reviewed and updated today  Allergies  1  Penicillins    Vitals  Vital Signs    Recorded: 21Jun2017 03:04PM   Temperature 97 6 F   Heart Rate 62   Systolic 809   Diastolic 84   Height 5 ft    Weight 248 lb    BMI Calculated 48 43   BSA Calculated 2 05   O2 Saturation 98     Physical Exam    Constitutional   General appearance: Abnormal   morbidly obese  Eyes   Conjunctiva and lids: No swelling, erythema, or discharge  Pupils and irises: Equal, round and reactive to light  Ears, Nose, Mouth, and Throat   External inspection of ears and nose: Normal     Pulmonary   Respiratory effort: No increased work of breathing or signs of respiratory distress  Auscultation of lungs: Clear to auscultation, equal breath sounds bilaterally, no wheezes, no rales, no rhonci  Cardiovascular   Palpation of heart: Normal PMI, no thrills      Auscultation of heart: Normal rate and rhythm, normal S1 and S2, without murmurs  Examination of extremities for edema and/or varicosities: Normal     Abdomen   Abdomen: Abnormal   The abdomen was obese  Bowel sounds were normal  The abdomen was soft and nontender  Lymphatic   Palpation of lymph nodes in neck: No lymphadenopathy  Skin   Skin and subcutaneous tissue: Normal without rashes or lesions  Neurologic   Cranial nerves: Cranial nerves 2-12 intact  Psychiatric   Orientation to person, place and time: Normal     Mood and affect: Normal          Health Management  Obstructive sleep apnea   CPAP face mask; every 1 week; Last 65VEH7225; Next Due: 36OQD1762; Overdue  Rectal bleeding   COLONOSCOPY; every 3 years; Last 11Aug2015; Next Due: 11Aug2018; Active    Future Appointments    Date/Time Provider Specialty Site   11/17/2017 08:20 AM Richa Joyner DO Cardiology [facility] CARDIOLOGY [facility]   10/06/2017 10:00 AM Justin Richardson MD Gastroenterology Adult [facility] OUTPATIENT   12/11/2017 09:15 AM Caren Burciaga [facility] [address]     Signatures   Electronically signed by :  Carrillo Saab [facility]; Jun 21 2017  3:35PM EST                       (Author)    Electronically signed by : Michelle Naik MD; Sep 11 2017  8:39AM EST                       (Author)

## 2017-10-27 ENCOUNTER — GENERIC CONVERSION - ENCOUNTER (OUTPATIENT)
Dept: OTHER | Facility: OTHER | Age: 57
End: 2017-10-27

## 2017-11-17 ENCOUNTER — ALLSCRIPTS OFFICE VISIT (OUTPATIENT)
Dept: OTHER | Facility: OTHER | Age: 57
End: 2017-11-17

## 2017-11-17 DIAGNOSIS — I25.10 ATHEROSCLEROTIC HEART DISEASE OF NATIVE CORONARY ARTERY WITHOUT ANGINA PECTORIS: ICD-10-CM

## 2017-11-17 DIAGNOSIS — I27.20 PULMONARY HYPERTENSION (HCC): ICD-10-CM

## 2017-11-17 DIAGNOSIS — E66.01 MORBID (SEVERE) OBESITY DUE TO EXCESS CALORIES (HCC): ICD-10-CM

## 2017-11-17 DIAGNOSIS — I51.9 HEART DISEASE: ICD-10-CM

## 2017-11-17 DIAGNOSIS — G47.33 OBSTRUCTIVE SLEEP APNEA: ICD-10-CM

## 2017-11-17 DIAGNOSIS — R91.1 SOLITARY PULMONARY NODULE: ICD-10-CM

## 2017-11-17 DIAGNOSIS — Z72.0 TOBACCO USE: ICD-10-CM

## 2017-11-17 DIAGNOSIS — I10 ESSENTIAL (PRIMARY) HYPERTENSION: ICD-10-CM

## 2017-11-18 NOTE — PROGRESS NOTES
Assessment  Assessed    1  Arteriosclerotic coronary artery disease (414 00) (I25 10)   2  Diastolic dysfunction (388 6) (I51 9)   3  Hypertension (401 9) (I10)   4  Morbid or severe obesity due to excess calories (278 01) (E66 01)   5  Obstructive sleep apnea (327 23) (G47 33)   6  Pulmonary hypertension (416 8) (I27 20)   7  Tobacco use (305 1) (Z72 0)    Plan  Arteriosclerotic coronary artery disease    · (1) CBC/ PLT (NO DIFF); Status:Active; Requested for:2017;    Perform:[facility]; [id number]; Ordered; For:Arteriosclerotic coronary artery disease; Ordered By:Rogelio Davison;   · (1) COMPREHENSIVE METABOLIC PANEL; Status:Active; Requested for:2017;    Perform:[facility]; PAULIE:[id number]; Ordered;coronary artery disease; Ordered By:Rogelio Davison;   · (1) LIPID PANEL FASTING W DIRECT LDL REFLEX; Status:Active; Requestedfor:2017;    Perform:[facility]; Due:2018; Ordered;coronary artery disease; Ordered By:Rogelio Davison;  Arteriosclerotic coronary artery disease, Diastolic dysfunction, Hypertension, Morbid orsevere obesity due to excess calories, Obstructive sleep apnea, Pulmonaryhypertension, SocHx: Tobacco use    · ECHO COMPLETE WITH CONTRAST IF INDICATED; Status:Hold For - Scheduling;Requested for:2017;    Perform:[facility]; Due:2018; Ordered;coronary artery disease, Diastolic dysfunction, Hypertension, Morbid or severe obesity due to excess calories, Obstructive sleep apnea, Pulmonary hypertension, SocHx: Tobacco use; Ordered By:Rogelio Davison;   · Follow-up Visit in 8 Months Evaluation and Treatment  Follow-up  Status: Hold For -Scheduling  Requested for: 09NLS5492   Ordered; For: Arteriosclerotic coronary artery disease, Diastolic dysfunction, Hypertension, Morbid or severe obesity due to excess calories, Obstructive sleep apnea, Pulmonary hypertension, SocHx: Tobacco use; Ordered By: Arbutus Res Performed:  Due: 69ILR1761  Diastolic dysfunction, Hypertension    · EKG/ECG- POC; Status:Complete;   Done: 22QCI7920 08:26AM   Performed: In Office; 72 [phone]; Last Updated Mirian Schafer; 11/17/2017 8:26:39 AM;Ordered; Today;dysfunction, Hypertension; Ordered By:Rodriguez Davison;    Discussion/Summary  Cardiology Discussion Summary Free Text Note Form [facility]:   Overall he's been doing well since her last visit  His coronary artery disease stable with no complaints of angina  Blood pressures have been controlled  He shows no signs of volume overload on exam  Her functional standpoint he's been doing well with increased second to be recently  He wants to resume his prior job as a   From a cardiac standpoint I've no limitations I like to check an car to [facility] is been no change we'll follow-up after that  History of Present Illness  Cardiology HPI Free Text Note Form [facility]: Mr Declan Adams is a very pleasant 77-year-old gentleman with a history of coronary disease, hypertension, obstructive sleep apnea, asthma who presents for aFollow-up visit  Overall he's been doing well since her last visit  He had some atypical chest pain in her last office visit which has since resolved  He continues to remain quite physically active around the house with no limitations  He's been trying to lose weight and has been fluctuating over the last several months  He denies any exertional chest pain or pressure, shortness of breath, light headedness, dizziness, or syncope  There's been no lower extremity edema, PND, orthopnea  He's been taking all medications as prescribed area and cholesterol this winter did rise slightly  presents today for routine scheduled follow-up visit  Overall he's been doing well and offers no complaints  He tells me that he's been fairly physically active this summer some walking activity around the house   He denies any exertional chest pain or dyspnea, lightheadedness, dizziness, or syncope  There've been no palpitations  He denies any lower extremity edema and overall his weight has been trending down he's lost about 15 pounds since her last visit  He's been trying to watch his diet closely  As high as he weighed close to 300 pounds and he's been doing a lot to lose weight over the past couple years  He's due for an echo and some blood work  Review of Systems  Cardiology Male ROS:    Cardiac: No complaints of chest pain, no palpitations, no fainiting  Skin: No complaints of nonhealing sores or skin rash  Genitourinary: No complaints of recurrent urinary tract infections, frequent urination at night, difficult urination, blood in urine, kidney stones, loss of bladder control, no kidney or prostate problems, no erectile dysfunction  Psychological: No complaints of feeling depressed, anxiety, panic attacks, or difficulty concentrating  General: No complaints of trouble sleeping, lack of energy, fatigue, appetite changes, weight changes, fever, frequent infections, or night sweats  Respiratory: No complaints of shortness of breath, cough with sputum, or wheezing  HEENT: No complaints of serious problems, hearing problems, nose problems, throat problems, or snoring  Gastrointestinal: No complaints of liver problems, nausea, vomiting, heartburn, constipation, bloody stools, diarrhea, problems swallowing, adbominal pain, or rectal bleeding  Hematologic: No complaints of bleeding disorders, anemia, blood clots, or excessive brusing  Neurological: No complaints of numbness, tingling, dizziness, weakness, seizures, headaches, syncope or fainting, AM fatigue, daytime sleepiness, no witnessed apnea episodes  Musculoskeletal: No complaints of arthritis, back pain, or painfull swelling  Active Problems  Problems    1  Arteriosclerotic coronary artery disease (414 00) (I25 10)   2  Asthma (493 90) (J45 909)   3  Bilateral low back pain without sciatica (724 2) (M54 5)   4  Chronic obstructive pulmonary disease (496) (J44 9)   5  Diastolic dysfunction (470 6) (I51 9)   6  Disc degeneration, lumbar (722 52) (M51 36)   7  Elevated blood sugar (790 29) (R73 9)   8  Encounter for screening for malignant neoplasm of colon (V76 51) (Z12 11)   9  Erectile dysfunction (607 84) (N52 9)   10  Eustachian tube dysfunction (381 81) (H69 80)   11  Heart burn (787 1) (R12)   12  Hypercholesterolemia (272 0) (E78 00)   13  Hypertension (401 9) (I10)   14  Lumbar compression fracture (805 4) (S32 000A)   15  Lumbar radiculopathy (724 4) (M54 16)   16  Lumbar spondylosis (721 3) (M47 816)   17  Morbid or severe obesity due to excess calories (278 01) (E66 01)   18  Need for influenza vaccination (V04 81) (Z23)   19  Nonalcoholic fatty liver disease (571 8) (K76 0)   20  Obstructive sleep apnea (327 23) (G47 33)   21  Occult blood positive stool (792 1) (R19 5)   22  Osteoporosis without current pathological fracture, unspecified osteoporosis type  (733 00) (M81 0)   23  Oxycodone use disorder, 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)   24  Persistent disorder of initiating or maintaining sleep (307 42) (G47 00)   25  Pulmonary hypertension (416 8) (I27 20)   26  Pulmonary nodule seen on imaging study (793 11) (R91 1)   27  Rectal bleeding (569 3) (K62 5)   28  Rectal bleeding (569 3) (K62 5)   29  Sacroiliitis (720 2) (M46 1)   30  Screening for diabetes mellitus (DM) (V77 1) (Z13 1)   31  Seborrheic dermatitis of scalp (690 18) (L21 9)   32  Shortness of breath (786 05) (R06 02)   33  Sprain or strain of hamstring muscle (843 8)   34  Tobacco use (305 1) (Z72 0)   35  Visit for pre-operative examination (V72 84) (Z01 818)   36  Visual impairment in both eyes (369 3) (H54 3)   37  Vitamin D deficiency (268 9) (E55 9)    Past Medical History  Problems    1  History of Acute low back pain (724 2) (M54 5)   2  History of Acute pain due to trauma (338 11) (G89 11)   3   History of Acute upper respiratory infection (465 9) (J06 9)   4  History of Bilateral otitis media (382 9) (H66 93)   5  History of Chronic serous otitis media (381 10) (H65 20)   6  History of acne (V13 3) (Z87 2)   7  History of chest pain (V13 89) (Z87 898)   8  History of diarrhea (V12 79) (Z87 898)   9  History of exposure to varicella (V01 71) (Z20 820)   10  History of Impacted cerumen of left ear (380 4) (H61 22)   11  Old myocardial infarction (412) (I25 2)   12  Old myocardial infarction (412) (I25 2)   13  History of Scalp lesion (709 9) (L98 9)   14  History of Traumatic compression fracture of T4 thoracic vertebra (805 2) (S22 040A)   15  History of Wound Bed Appearance Granulated  Active Problems And Past Medical History Reviewed: The active problems and past medical history were reviewed and updated today  Surgical History  Problems    1  History of Cardiac Cath Procedure Summary   2  History of Denial Of Any Significant Medical History   3  History of Oral Surgery Tooth Extraction   4  History of Tonsillectomy  Surgical History Reviewed: The surgical history was reviewed and updated today  Family History  Mother    1  Family history of diabetes mellitus (V18 0) (Z83 3)  Family History    2  Family history of Back problem   3  Family history of Diabetes Mellitus (250 00)   4  Family history of cardiac disorder (V17 49) (Z82 49)   5  Family history of cerebrovascular accident (V17 1) (Z82 3)   6  Family history of hypertension (V17 49) (Z82 49)   7  Family history of sleep apnea (V19 8) (Z82 0)   8  Family history of High cholesterol  Family History Reviewed: The family history was reviewed and updated today  Social History  Problems    · Being A Social Drinker   · Caffeine Use   · Current Every Day Smoker (305 1)   · Denied: History of Drug Use   · Marital History -    · Tobacco use (305 1) (Z72 0)  Social History Reviewed: The social history was reviewed and updated today  Current Meds   1   Albuterol Sulfate (2 5 MG/3ML) 0 083% Inhalation Nebulization Solution; USE 1 UNIT DOSE VIA NEBULIZER  4 TIMES A DAY AS NEEDED; Therapy: 67Xbu7303 to (Evaluate:15Jan2016)  Requested for: 12Esm7901; Last Rx:08Hga1903 Ordered   2  Aspir-81 81 MG Oral Tablet Delayed Release; TAKE 1 TABLET DAILY; Therapy: 68XNR0729 to (Evaluate:25Jun2017)  Requested for: 16SEG7304; Last Rx:92Wkj2463 Ordered   3  Back Support L/XL Miscellaneous; USE AS DIRECTED; Therapy: 94WNV0961 to (Last Rx:00Hbv5942)  Requested for: 42Axm5979 Ordered   4  Diclofenac Potassium 50 MG Oral Tablet; Take 1 tablet bid with meals when necessary; Therapy: 09BDL9476 to (Evaluate:45Jja0246)  Requested for: 56PUV9152; Last Rx:20Fhi8199 Ordered   5  Furosemide 20 MG Oral Tablet; Take 1 tablet twice daily  Requested for: 75Gjv7285; Last Rx:86Keq3073 Ordered   6  Gabapentin 400 MG Oral Capsule; TAKE 1 CAPSULE 3 TIMES DAILY; Therapy: 73LCJ3766 to (Evaluate:99Jay3779)  Requested for: 48YNK6011; Last Rx:27Yfy5188 Ordered   7  Lisinopril 40 MG Oral Tablet; TAKE 1 TABLET DAILY FOR BLOOD PRESSURE; Therapy: 74SYJ9401 to (Evaluate:77Plc9823)  Requested for: 08MEO4931; Last Rx:64Ago3868 Ordered   8  Metoprolol Tartrate 50 MG Oral Tablet; TAKE 1 TABLET EVERY MORNING; Therapy: 22OBB5153 to (Evaluate:43Upa5216)  Requested for: 59Adu7774; Last Rx:20Usp3326 Ordered   9  Oxycodone-Acetaminophen 5-325 MG Oral Tablet; TAKE 1 TABLET  DAILY AS NEEDED FOR PAIN; Therapy: 13UEF1785 to (Evaluate:22Mar2017); Last Rx:00Pwi3896 Ordered   10  Potassium Chloride ER 10 MEQ Oral Tablet Extended Release; take 1 tablet by mouth  once daily; Therapy: 63CWQ0464 to (Evaluate:12Apr2017)  Requested for: 95IYK4243; Last  Rx:62Chd8027 Ordered   11  Pravastatin Sodium 80 MG Oral Tablet; TAKE 1 TABLET DAILY; Therapy: 66VEN7506 to (Chi Zambrano)  Requested for: 10DXL8555; Last  Rx:02Uei1645 Ordered   12  Spiriva HandiHaler 18 MCG Inhalation Capsule; INHALE THE CONTENTS OF 1  CAPSULE ONCE DAILY;   Therapy: 51LRC8772 to (Evaluate:85Dbz1736)  Requested for: 09Ioh9751; Last  Rx:74Hyt1835 Ordered   13  Suprep Bowel Prep Kit 17 5-3 13-1 6 GM/180ML Oral Solution; USE AS DIRECTED; Therapy: 60XIA5724 to (Last Rx:60Iwy5630)  Requested for: 79Ddz9729 Ordered   14  Ventolin  (90 Base) MCG/ACT Inhalation Aerosol Solution; INHALE 2 PUFFS  EVERY 4 HOURS AS NEEDED; Therapy: 27JQA0473 to (Evaluate:70Bms5148)  Requested for: 57PPD5954; Last  Rx:99Zyc6080 Ordered   15  Vitamin D (Ergocalciferol) 96861 UNIT Oral Capsule; 1 cap 2x weekly for 6 weeks; Therapy: 07WPP1770 to (Evaluate:22Apr2017)  Requested for: 93IJL7641; Last  Rx:29Mar2017 Ordered   16  Zolpidem Tartrate 5 MG Oral Tablet; TAKE 1 TABLET AT  BEDTIME AS NEEDED FOR  INSOMNIA; Therapy: 32LEA9451 to (Evaluate:05Oct2017); Last Rx:10Aug2017 Ordered    Allergies  Medication    1  Penicillins    Vitals  Vital Signs    Recorded: 97KTR8868 08:37AM Recorded: 92XVM3173 08:19AM   Heart Rate  62   Systolic 900 905, LUE, Sitting   Diastolic 80 84, LUE, Sitting   BP CUFF SIZE  Large   Height  5 ft    Weight  226 lb    BMI Calculated  44 14   BSA Calculated  1 97       Physical Exam   Constitutional  General appearance: No acute distress, well appearing and well nourished  Eyes  Conjunctiva and Sclera examination: Conjunctiva pink, sclera anicteric  Ears, Nose, Mouth, and Throat - Oropharynx: Clear, nares are clear, mucous membranes are moist   Neck  Neck and thyroid: Normal, supple, trachea midline, no thyromegaly  Pulmonary  Respiratory effort: No increased work of breathing or signs of respiratory distress  Auscultation of lungs: Clear to auscultation, no rales, no rhonchi, no wheezing, good air movement  Cardiovascular  Auscultation of heart: Normal rate and rhythm, normal S1 and S2, no murmurs  Carotid pulses: Normal, 2+ bilaterally  Peripheral vascular exam: Normal pulses throughout, no tenderness, erythema or swelling  Pedal pulses: Normal, 2+ bilaterally     Examination of extremities for edema and/or varicosities: Normal    Abdomen  Abdomen: Non-tender and no distention  Liver and spleen: No hepatomegaly or splenomegaly  Musculoskeletal Gait and station: Normal gait  -- Digits and nails: Normal without clubbing or cyanosis  -- Inspection/palpation of joints, bones, and muscles: Normal, ROM normal    Skin - Skin and subcutaneous tissue: Normal without rashes or lesions  Skin is warm and well perfused, normal turgor  Neurologic - Cranial nerves: II - XII intact  -- Speech: Normal    Psychiatric - Orientation to person, place, and time: Normal -- Mood and affect: Normal       Results/Data  EKG/ECG- POC 93WCI5946 08:26AM Armando Gore     Test Name Result Flag Reference   EKG/ECG 11/17/2017       ECG Report: Sinus rhythm with PVC      Health Management  Obstructive sleep apnea   CPAP face mask; every 1 week; Last 94QXN6025; Next Due: 03QBR9847; Overdue  Rectal bleeding   COLONOSCOPY; every 3 years; Last 70MIO6841; Next Due: 90Jsj4012;  Active    Future Appointments    Date/Time Provider Specialty Site   12/11/2017 09:15 AM Maida Dean, [facility] [address]       Signatures   Electronically signed by : Sandrita Kirkpatrick DO; Nov 17 2017  9:32AM EST                       (Author)

## 2017-11-21 ENCOUNTER — APPOINTMENT (OUTPATIENT)
Dept: LAB | Facility: HOSPITAL | Age: 57
End: 2017-11-21
Attending: INTERNAL MEDICINE
Payer: MEDICARE

## 2017-11-21 ENCOUNTER — HOSPITAL ENCOUNTER (OUTPATIENT)
Dept: NON INVASIVE DIAGNOSTICS | Facility: HOSPITAL | Age: 57
Discharge: HOME | End: 2017-11-21
Attending: INTERNAL MEDICINE
Payer: MEDICARE

## 2017-11-21 ENCOUNTER — TRANSCRIBE ORDERS (OUTPATIENT)
Dept: ADMINISTRATIVE | Facility: HOSPITAL | Age: 57
End: 2017-11-21

## 2017-11-21 DIAGNOSIS — I27.20 PULMONARY HYPERTENSION (HCC): ICD-10-CM

## 2017-11-21 DIAGNOSIS — Z72.0 TOBACCO USE: ICD-10-CM

## 2017-11-21 DIAGNOSIS — I51.9 HEART DISEASE: ICD-10-CM

## 2017-11-21 DIAGNOSIS — I10 ESSENTIAL (PRIMARY) HYPERTENSION: ICD-10-CM

## 2017-11-21 DIAGNOSIS — G47.33 OBSTRUCTIVE SLEEP APNEA: ICD-10-CM

## 2017-11-21 DIAGNOSIS — I25.10 ATHEROSCLEROTIC HEART DISEASE OF NATIVE CORONARY ARTERY WITHOUT ANGINA PECTORIS: ICD-10-CM

## 2017-11-21 DIAGNOSIS — E66.01 MORBID (SEVERE) OBESITY DUE TO EXCESS CALORIES (HCC): ICD-10-CM

## 2017-11-21 LAB
ALBUMIN SERPL BCP-MCNC: 3.4 G/DL (ref 3.5–5)
ALP SERPL-CCNC: 83 U/L (ref 46–116)
ALT SERPL W P-5'-P-CCNC: 29 U/L (ref 12–78)
ANION GAP SERPL CALCULATED.3IONS-SCNC: 7 MMOL/L (ref 4–13)
AST SERPL W P-5'-P-CCNC: 15 U/L (ref 5–45)
BILIRUB SERPL-MCNC: 0.2 MG/DL (ref 0.2–1)
BUN SERPL-MCNC: 14 MG/DL (ref 5–25)
CALCIUM SERPL-MCNC: 9.2 MG/DL (ref 8.3–10.1)
CHLORIDE SERPL-SCNC: 104 MMOL/L (ref 100–108)
CHOLEST SERPL-MCNC: 175 MG/DL (ref 50–200)
CO2 SERPL-SCNC: 27 MMOL/L (ref 21–32)
CREAT SERPL-MCNC: 0.9 MG/DL (ref 0.6–1.3)
ERYTHROCYTE [DISTWIDTH] IN BLOOD BY AUTOMATED COUNT: 15.7 % (ref 11.6–15.1)
GFR SERPL CREATININE-BSD FRML MDRD: 94 ML/MIN/1.73SQ M
GLUCOSE P FAST SERPL-MCNC: 90 MG/DL (ref 65–99)
HCT VFR BLD AUTO: 44.5 % (ref 36.5–49.3)
HDLC SERPL-MCNC: 51 MG/DL (ref 40–60)
HGB BLD-MCNC: 14 G/DL (ref 12–17)
LDLC SERPL CALC-MCNC: 99 MG/DL (ref 0–100)
MCH RBC QN AUTO: 28.7 PG (ref 26.8–34.3)
MCHC RBC AUTO-ENTMCNC: 31.5 G/DL (ref 31.4–37.4)
MCV RBC AUTO: 91 FL (ref 82–98)
PLATELET # BLD AUTO: 249 THOUSANDS/UL (ref 149–390)
PMV BLD AUTO: 10.9 FL (ref 8.9–12.7)
POTASSIUM SERPL-SCNC: 4.2 MMOL/L (ref 3.5–5.3)
PROT SERPL-MCNC: 7.1 G/DL (ref 6.4–8.2)
RBC # BLD AUTO: 4.87 MILLION/UL (ref 3.88–5.62)
SODIUM SERPL-SCNC: 138 MMOL/L (ref 136–145)
TRIGL SERPL-MCNC: 126 MG/DL
WBC # BLD AUTO: 11.65 THOUSAND/UL (ref 4.31–10.16)

## 2017-11-21 PROCEDURE — 80061 LIPID PANEL: CPT

## 2017-11-21 PROCEDURE — 93306 TTE W/DOPPLER COMPLETE: CPT

## 2017-11-21 PROCEDURE — 36415 COLL VENOUS BLD VENIPUNCTURE: CPT

## 2017-11-21 PROCEDURE — 80053 COMPREHEN METABOLIC PANEL: CPT

## 2017-11-21 PROCEDURE — 85027 COMPLETE CBC AUTOMATED: CPT

## 2017-12-08 ENCOUNTER — GENERIC CONVERSION - ENCOUNTER (OUTPATIENT)
Dept: OTHER | Facility: OTHER | Age: 57
End: 2017-12-08

## 2017-12-11 ENCOUNTER — ALLSCRIPTS OFFICE VISIT (OUTPATIENT)
Dept: FAMILY MEDICINE CLINIC | Facility: CLINIC | Age: 57
End: 2017-12-11
Payer: COMMERCIAL

## 2017-12-11 PROCEDURE — 99213 OFFICE O/P EST LOW 20 MIN: CPT | Performed by: FAMILY MEDICINE

## 2017-12-13 ENCOUNTER — HOSPITAL ENCOUNTER (OUTPATIENT)
Dept: CT IMAGING | Facility: HOSPITAL | Age: 57
Discharge: HOME | End: 2017-12-13
Payer: COMMERCIAL

## 2017-12-13 DIAGNOSIS — R91.1 SOLITARY PULMONARY NODULE: ICD-10-CM

## 2017-12-13 PROCEDURE — 71250 CT THORAX DX C-: CPT

## 2017-12-15 NOTE — PROGRESS NOTES
Assessment  1  Depression screen (V79 0) (Z13 89)   2  Hypertension (401 9) (I10)   3  Pulmonary nodule seen on imaging study (793 11) (R91 1)    Plan  Bilateral low back pain without sciatica, Persistent disorder of initiating or maintainingsleep    · Zolpidem Tartrate 5 MG Oral Tablet; TAKE 1 TABLET AT  BEDTIME AS NEEDEDFOR INSOMNIA  Depression screen    · *VB-Depression Screening; Status:Complete;   Done: 59HTG1898 12:00AM  Hypertension    · Follow-up visit in 3 months Evaluation and Treatment  Follow-up  Status: Hold For -Scheduling  Requested for: 44YZN4844  Pulmonary nodule seen on imaging study    · * CT CHEST WO CONTRAST; Status:Hold For - Scheduling; Requested for:36Oen7111;     Discussion/Summary    Continue current meds  Continue exercise program  Get back on diet  Will follow up on chest nodule  RTC 3 months  The treatment plan was reviewed with the patient/guardian  The patient/guardian understands and agrees with the treatment plan      Chief Complaint  Patient is here for regular check up and he will need 90 days supply on medication refill  History of Present Illness  63 yo male presents for above  He is doing well  Back pain is mostly gone  Discussed weight gain today  He admits he was out of control during Thanksgiving holiday  Review of Systems   Constitutional: as noted in HPI  Eyes: No complaints of eye pain, no red eyes, no discharge from eyes, no itchy eyes  ENT: no complaints of earache, no hearing loss, no nosebleeds, no nasal discharge, no sore throat, no hoarseness  Cardiovascular: No complaints of slow heart rate, no fast heart rate, no chest pain, no palpitations, no leg claudication, no lower extremity  Respiratory: No complaints of shortness of breath, no wheezing, no cough, no SOB on exertion, no orthopnea or PND  Gastrointestinal: No complaints of abdominal pain, no constipation, no nausea or vomiting, no diarrhea or bloody stools    Genitourinary: No complaints of dysuria, no incontinence, no hesitancy, no nocturia, no genital lesion, no testicular pain  Musculoskeletal: No complaints of arthralgia, no myalgias, no joint swelling or stiffness, no limb pain or swelling  Integumentary: No complaints of skin rash or skin lesions, no itching, no skin wound, no dry skin  Neurological: No compliants of headache, no confusion, no convulsions, no numbness or tingling, no dizziness or fainting, no limb weakness, no difficulty walking  Psychiatric: Is not suicidal, no sleep disturbances, no anxiety or depression, no change in personality, no emotional problems  Over the past 2 weeks, how often have you been bothered by the following problems? 1 ) Little interest or pleasure in doing things? Not at all   2 ) Feeling down, depressed or hopeless? Not at all   3 ) Trouble falling asleep or sleeping too much? Not at all   4 ) Feeling tired or having little energy? Not at all   5 ) Poor appetite or overeating? Not at all   6 ) Feeling bad about yourself, or that you are a failure, or have let yourself or your family down? Not at all   7 ) Trouble concentrating on things, such as reading a newspaper or watching television? Not at all   8 ) Moving or speaking so slowly that other people could have noticed, or the opposite, moving or speaking faster than usual? Not at all   9 ) Thoughts that you would be better off dead or of hurting yourself in some way? Not at all  Score 0      Active Problems  1  Arteriosclerotic coronary artery disease (414 00) (I25 10)   2  Asthma (493 90) (J45 909)   3  Bilateral low back pain without sciatica (724 2) (M54 5)   4  Chronic obstructive pulmonary disease (496) (J44 9)   5  Diastolic dysfunction (954 1) (I51 9)   6  Disc degeneration, lumbar (722 52) (M51 36)   7  Elevated blood sugar (790 29) (R73 9)   8  Encounter for screening for malignant neoplasm of colon (V76 51) (Z12 11)   9  Erectile dysfunction (607 84) (N52 9)   10   Eustachian tube dysfunction (381 81) (H69 80)   11  Heart burn (787 1) (R12)   12  Hypercholesterolemia (272 0) (E78 00)   13  Hypertension (401 9) (I10)   14  Lumbar compression fracture (805 4) (S32 000A)   15  Lumbar radiculopathy (724 4) (M54 16)   16  Lumbar spondylosis (721 3) (M47 816)   17  Morbid or severe obesity due to excess calories (278 01) (E66 01)   18  Need for influenza vaccination (V04 81) (Z23)   19  Nonalcoholic fatty liver disease (571 8) (K76 0)   20  Obstructive sleep apnea (327 23) (G47 33)   21  Occult blood positive stool (792 1) (R19 5)   22  Osteoporosis without current pathological fracture, unspecified osteoporosis type  (733 00) (M81 0)   23  Persistent disorder of initiating or maintaining sleep (307 42) (G47 00)   24  Pulmonary hypertension (416 8) (I27 20)   25  Pulmonary nodule seen on imaging study (793 11) (R91 1)   26  Rectal bleeding (569 3) (K62 5)   27  Sacroiliitis (720 2) (M46 1)   28  Screening for diabetes mellitus (DM) (V77 1) (Z13 1)   29  Seborrheic dermatitis of scalp (690 18) (L21 9)   30  Shortness of breath (786 05) (R06 02)   31  Sprain or strain of hamstring muscle (843 8)   32  Tobacco use (305 1) (Z72 0)   33  Visit for pre-operative examination (V72 84) (Z01 818)   34  Visual impairment in both eyes (369 3) (H54 3)   35  Vitamin D deficiency (268 9) (E55 9)    Past Medical History  1  History of Acute low back pain (724 2) (M54 5)   2  History of Acute pain due to trauma (338 11) (G89 11)   3  History of Acute upper respiratory infection (465 9) (J06 9)   4  History of Bilateral otitis media (382 9) (H66 93)   5  History of Chronic serous otitis media (381 10) (H65 20)   6  History of acne (V13 3) (Z87 2)   7  History of chest pain (V13 89) (Z87 898)   8  History of diarrhea (V12 79) (Z87 898)   9  History of exposure to varicella (V01 71) (Z20 820)   10  History of rectal bleeding (V12 79) (Z87 19)   11  History of Impacted cerumen of left ear (380 4) (H61 22)   12   Old myocardial infarction (412) (I25 2)   13  Old myocardial infarction (412) (I25 2)   14  History of Oxycodone use disorder, 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)   15  History of Scalp lesion (709 9) (L98 9)   16  History of Traumatic compression fracture of T4 thoracic vertebra (805 2) (S22 040A)   17  History of Wound Bed Appearance Granulated    The active problems and past medical history were reviewed and updated today  Surgical History  1  History of Cardiac Cath Procedure Summary   2  History of Denial Of Any Significant Medical History   3  History of Oral Surgery Tooth Extraction   4  History of Tonsillectomy    The surgical history was reviewed and updated today  Family History  Mother    1  Family history of diabetes mellitus (V18 0) (Z83 3)  Family History    2  Family history of Back problem   3  Family history of Diabetes Mellitus (250 00)   4  Family history of cardiac disorder (V17 49) (Z82 49)   5  Family history of cerebrovascular accident (V17 1) (Z82 3)   6  Family history of hypertension (V17 49) (Z82 49)   7  Family history of sleep apnea (V19 8) (Z82 0)   8  Family history of High cholesterol    The family history was reviewed and updated today  Social History   · Being A Social Drinker   · Caffeine Use   · Current Every Day Smoker (305 1)   · Denied: History of Drug Use   · Marital History -    · Tobacco use (305 1) (Z72 0)  The social history was reviewed and updated today  The social history was reviewed and is unchanged  Current Meds   1  Albuterol Sulfate (2 5 MG/3ML) 0 083% Inhalation Nebulization Solution; USE 1 UNIT DOSE VIA NEBULIZER  4 TIMES A DAY AS NEEDED; Therapy: 69Kff9449 to (Evaluate:15Jan2016)  Requested for: 06Jvl2660; Last Rx:86Gwd5879 Ordered   2  Aspir-81 81 MG Oral Tablet Delayed Release; TAKE 1 TABLET DAILY; Therapy: 61XYZ5973 to (Evaluate:25Jun2017)  Requested for: 35OEX1498; Last Rx:30Jun2016 Ordered   3  Diclofenac Potassium 50 MG Oral Tablet;  Take 1 tablet bid with meals when necessary; Therapy: 27VUX7558 to (Evaluate:66Sjt5923)  Requested for: 81PSW2596; Last Rx:48Pzs3565 Ordered   4  Furosemide 20 MG Oral Tablet; Take 1 tablet twice daily  Requested for: 93Jpy9487; Last Rx:21Feb2017 Ordered   5  Gabapentin 400 MG Oral Capsule; TAKE 1 CAPSULE 3 TIMES DAILY; Therapy: 41MKS1148 to (Evaluate:03Cvc1015)  Requested for: 34WMZ6159; Last Rx:30May2017 Ordered   6  Lisinopril 40 MG Oral Tablet; TAKE 1 TABLET DAILY FOR BLOOD PRESSURE; Therapy: 64IQZ0433 to (Evaluate:55Pwc4003)  Requested for: 02IUY7933; Last Rx:06Feb2017 Ordered   7  Metoprolol Tartrate 50 MG Oral Tablet; TAKE 1 TABLET EVERY MORNING; Therapy: 92PFH7383 to (Evaluate:94Rpj2393)  Requested for: 69Xft2833; Last Rx:30Xca4457 Ordered   8  Potassium Chloride ER 10 MEQ Oral Tablet Extended Release; take 1 tablet by mouth once daily; Therapy: 88OYX1815 to (Evaluate:35Sbz0838)  Requested for: 56SGY1983; Last Rx:19Qjr2155 Ordered   9  Pravastatin Sodium 80 MG Oral Tablet; TAKE 1 TABLET DAILY; Therapy: 46BPW9828 to ([place])  Requested for: 43DWT9123; Last Rx:12May2017 Ordered   10  Spiriva HandiHaler 18 MCG Inhalation Capsule; INHALE THE CONTENTS OF 1  CAPSULE ONCE DAILY; Therapy: 48KQY0356 to (Evaluate:43Mcb3701)  Requested for: 63Gjm0965; Last  Rx:98Nrj7599 Ordered   11  Ventolin  (90 Base) MCG/ACT Inhalation Aerosol Solution; INHALE 2 PUFFS  EVERY 4 HOURS AS NEEDED; Therapy: 94EYU9827 to (Evaluate:17May2016)  Requested for: 92ZKZ4739; Last  Rx:18Jan2016 Ordered   12  Zolpidem Tartrate 5 MG Oral Tablet; TAKE 1 TABLET AT  BEDTIME AS NEEDED FOR  INSOMNIA; Therapy: 65FRC7289 to (Evaluate:69Zob0180); Last Rx:10Aug2017 Ordered    The medication list was reviewed and updated today  Allergies  1   Penicillins    Vitals  Vital Signs    Recorded: 41Xol2888 09:20AM   Temperature 98 8 F   Heart Rate 70   Systolic 638   Diastolic 76   Height 5 ft    Weight 240 lb    BMI Calculated 46 87   BSA Calculated 2 02   O2 Saturation 97     Physical Exam   Constitutional  General appearance: Abnormal   morbidly obese  Eyes  Conjunctiva and lids: No swelling, erythema, or discharge  Pupils and irises: Equal, round and reactive to light  Ears, Nose, Mouth, and Throat  External inspection of ears and nose: Normal    Pulmonary  Respiratory effort: No increased work of breathing or signs of respiratory distress  Auscultation of lungs: Clear to auscultation, equal breath sounds bilaterally, no wheezes, no rales, no rhonci  Cardiovascular  Palpation of heart: Normal PMI, no thrills  Auscultation of heart: Normal rate and rhythm, normal S1 and S2, without murmurs  Examination of extremities for edema and/or varicosities: Normal    Lymphatic  Palpation of lymph nodes in neck: No lymphadenopathy  Skin  Skin and subcutaneous tissue: Normal without rashes or lesions  Neurologic  Cranial nerves: Cranial nerves 2-12 intact  Psychiatric  Orientation to person, place and time: Normal    Mood and affect: Normal          Results/Data  PHQ-9 Adult Depression Screening 66Zpr3786 09:45AM Gerardo Betters     Test Name Result Flag Reference   PHQ-9 Adult Depression Score 0     PHQ-9 Adult Depression Screening Negative       *VB-Depression Screening 42Qmk7517 12:00AM Gerardo Betters     Test Name Result Flag Reference   Depression Scale Result      Depression Screen - Negative For Symptoms       Health Management  Obstructive sleep apnea   CPAP face mask; every 1 week; Last 26SOE8745; Next Due: 11EFV8108; Overdue  Rectal bleeding   COLONOSCOPY; every 3 years; Last 64XOY3575; Next Due: 31Lus3747; Active    Future Appointments    Date/Time Provider Specialty Site   03/12/2018 08:30 AM Gerardo Harmon [facility] [address] [place] [address]   01/10/2018 01:00 PM Moon Bradley [facility]  [facility]     Signatures   Electronically signed by :  Latonya Noguera [facility]; Dec 11 2017  9:55AM EST                       (Author)    Electronically signed by : Ralph Sellers MD; Dec 14 2017  8:33AM EST                       (Author)

## 2017-12-27 ENCOUNTER — HOSPITAL ENCOUNTER (EMERGENCY)
Facility: HOSPITAL | Age: 57
Discharge: HOME | End: 2017-12-27
Attending: EMERGENCY MEDICINE | Admitting: EMERGENCY MEDICINE
Payer: COMMERCIAL

## 2017-12-27 VITALS
SYSTOLIC BLOOD PRESSURE: 159 MMHG | HEIGHT: 62 IN | RESPIRATION RATE: 17 BRPM | BODY MASS INDEX: 42.84 KG/M2 | DIASTOLIC BLOOD PRESSURE: 86 MMHG | WEIGHT: 232.8 LBS | TEMPERATURE: 98.9 F | OXYGEN SATURATION: 98 % | HEART RATE: 69 BPM

## 2017-12-27 DIAGNOSIS — L21.9 SEBORRHEIC DERMATITIS OF SCALP: ICD-10-CM

## 2017-12-27 DIAGNOSIS — S39.012A LUMBAR STRAIN, INITIAL ENCOUNTER: Primary | ICD-10-CM

## 2017-12-27 PROCEDURE — 99283 EMERGENCY DEPT VISIT LOW MDM: CPT

## 2017-12-27 RX ORDER — METHOCARBAMOL 500 MG/1
500 TABLET, FILM COATED ORAL 2 TIMES DAILY
Qty: 20 TABLET | Refills: 0 | Status: SHIPPED | OUTPATIENT
Start: 2017-12-27 | End: 2018-05-22 | Stop reason: HOSPADM

## 2017-12-27 RX ORDER — NAPROXEN 250 MG/1
500 TABLET ORAL ONCE
Status: COMPLETED | OUTPATIENT
Start: 2017-12-27 | End: 2017-12-27

## 2017-12-27 RX ADMIN — NAPROXEN 500 MG: 250 TABLET ORAL at 15:48

## 2017-12-27 NOTE — ED PROVIDER NOTES
History  Chief Complaint   Patient presents with    Back Pain     Pt reports he woke up with lower back pain this morning  Denies any trauma/injury  History provided by:  Patient and medical records  Back Pain   Pain location: R lower paralumbar pain  Quality:  Aching and cramping  Radiates to:  Does not radiate  Pain severity:  Moderate  Pain is:  Same all the time  Onset quality:  Sudden  Duration:  8 hours (Very minimal back pain yesterday evening but significantly worse upon awakening this morning)  Timing:  Constant  Progression:  Unchanged  Chronicity:  Recurrent (Has had previous lower back myofascial strains treated successfully with NSAID/muscle relaxant  No hx spinal surgery  Did have previous hx nondisplaced vertebral fx of unknown location 2/2 fall years prior that resolved without intervention )  Context: lifting heavy objects (Reports he has been lifting engine parts and shovelling snow over past several days without acute fall/injury/trauma  )    Context: not emotional stress, not falling, not jumping from heights, not occupational injury, not pedestrian accident, not physical stress and not recent illness    Relieved by:  Being still  Worsened by:  Standing  Ineffective treatments: Takes methocarbamol 500 mg BID at baseline; states he has increased the dose to 1000 mg BID x2d without improvement in sx  Last dose this morning    Associated symptoms: no abdominal swelling, no bladder incontinence, no bowel incontinence, no dysuria, no fever, no leg pain, no numbness, no paresthesias, no tingling and no weakness    Risk factors: obesity    Risk factors: no hx of osteoporosis, no lack of exercise (Patient states he is regularly physically active and has not changed this leading up to back pain today) and no recent surgery    Rash   Location:  Head/neck  Head/neck rash location:  Scalp  Quality: dryness, itchiness and redness    Severity:  Moderate  Onset quality:  Gradual  Duration:  2 months  Timing:  Intermittent  Progression:  Waxing and waning  Chronicity:  New  Context: not animal contact, not chemical exposure, not eggs, not exposure to similar rash, not food, not medications and not new detergent/soap    Relieved by:  Nothing  Worsened by:  Nothing  Ineffective treatments: Has tried anti-dandruff shampoo without improvement in sx  Associated symptoms: no fatigue, no fever, no nausea and not vomiting        Prior to Admission Medications   Prescriptions Last Dose Informant Patient Reported? Taking?   aspirin (ECOTRIN LOW STRENGTH) 81 mg EC tablet 12/27/2017 at Unknown time  Yes Yes   Sig: Take 81 mg by mouth daily  budesonide-formoterol (SYMBICORT) 160-4 5 mcg/act inhaler 12/27/2017 at Unknown time  Yes Yes   Sig: Inhale 2 puffs daily     furosemide (LASIX) 20 mg tablet 12/27/2017 at Unknown time  Yes Yes   Sig: Take 20 mg by mouth daily after breakfast    furosemide (LASIX) 20 mg tablet 12/26/2017 at Unknown time  Yes Yes   Sig: Take 10 mg by mouth daily at bedtime  lisinopril (ZESTRIL) 40 mg tablet 12/27/2017 at Unknown time  Yes Yes   Sig: Take 40 mg by mouth daily     methocarbamol (ROBAXIN) 500 mg tablet 12/27/2017 at Unknown time  Yes Yes   Sig: Take 500 mg by mouth 2 (two) times a day Pt reports took last pill this morning    metoprolol tartrate (LOPRESSOR) 25 mg tablet 12/27/2017 at Unknown time  Yes Yes   Sig: Take 50 mg by mouth every 12 (twelve) hours     pravastatin (PRAVACHOL) 40 mg tablet 12/26/2017 at Unknown time  Yes Yes   Sig: Take 80 mg by mouth daily at bedtime     tiotropium (SPIRIVA) 18 mcg inhalation capsule 12/27/2017 at Unknown time  Yes Yes   Sig: Place 18 mcg into inhaler and inhale daily   zolpidem (AMBIEN CR) 12 5 MG CR tablet Past Month at Unknown time  Yes Yes   Sig: Take 5 mg by mouth daily at bedtime as needed for sleep        Facility-Administered Medications: None       Past Medical History:   Diagnosis Date    CAD (coronary artery disease)     Chronic back pain     Chronic diastolic CHF (congestive heart failure) (HCC)     COPD (chronic obstructive pulmonary disease) (HCC)     HLD (hyperlipidemia)     Hypertension     ALEXYS (obstructive sleep apnea)     Shortness of breath     Vitamin D deficiency        Past Surgical History:   Procedure Laterality Date    DENTAL SURGERY      MO COLONOSCOPY FLX DX W/COLLJ SPEC WHEN PFRMD N/A 10/6/2017    Procedure: COLONOSCOPY;  Surgeon: Jessica Romero MD;  Location: [facility];  Service: Gastroenterology    TONSILLECTOMY         Family History   Problem Relation Age of Onset    Diabetes Mother      I have reviewed and agree with the history as documented  Social History   Substance Use Topics    Smoking status: Current Every Day Smoker     Packs/day: 1 00     Years: 20 00     Types: Cigarettes    Smokeless tobacco: Never Used    Alcohol use Yes      Comment: social        Review of Systems   Constitutional: Negative for chills, fatigue and fever  Gastrointestinal: Negative for bowel incontinence, nausea and vomiting  Genitourinary: Negative for bladder incontinence, difficulty urinating, dysuria and flank pain  Musculoskeletal: Positive for back pain  Negative for gait problem, joint swelling, neck pain and neck stiffness  Skin: Positive for rash (scalp and L side of face)  Negative for color change, pallor and wound  Neurological: Negative for tingling, weakness, numbness and paresthesias  Hematological: Negative for adenopathy  Does not bruise/bleed easily  All other systems reviewed and are negative        Physical Exam  ED Triage Vitals [12/27/17 1409]   Temperature Pulse Respirations Blood Pressure SpO2   98 9 °F (37 2 °C) 69 17 (!) 172/67 98 %      Temp Source Heart Rate Source Patient Position - Orthostatic VS BP Location FiO2 (%)   Temporal Monitor Sitting Right arm --      Pain Score       8           Orthostatic Vital Signs  Vitals:    12/27/17 1409 12/27/17 1414   BP: (!) 172/67 159/86 Pulse: 69    Patient Position - Orthostatic VS: Sitting        Physical Exam   Constitutional: He is oriented to person, place, and time  He appears well-developed and well-nourished  He is cooperative  No distress  HENT:   Head: Normocephalic and atraumatic  Right Ear: Hearing and external ear normal    Left Ear: Hearing and external ear normal    Eyes: Conjunctivae, EOM and lids are normal  Pupils are equal, round, and reactive to light  Neck: Trachea normal, normal range of motion and phonation normal  Neck supple  No JVD present  No tracheal tenderness, no spinous process tenderness and no muscular tenderness present  No tracheal deviation present  No thyroid mass and no thyromegaly present  Cardiovascular: Normal rate, regular rhythm, S1 normal, S2 normal, normal heart sounds and intact distal pulses  Exam reveals no gallop and no friction rub  No murmur heard  Pulses:       Radial pulses are 2+ on the right side, and 2+ on the left side  Dorsalis pedis pulses are 2+ on the right side, and 2+ on the left side  Posterior tibial pulses are 2+ on the right side, and 2+ on the left side  Pulmonary/Chest: Effort normal and breath sounds normal  No stridor  No respiratory distress  He has no decreased breath sounds  He has no wheezes  He has no rhonchi  He has no rales  He exhibits no tenderness  Musculoskeletal: Normal range of motion  He exhibits no edema or deformity  Cervical back: Normal         Thoracic back: Normal         Lumbar back: He exhibits tenderness  He exhibits normal range of motion, no bony tenderness, no swelling, no edema, no deformity, no laceration, no pain, no spasm and normal pulse  Back:    Lymphadenopathy:     He has no cervical adenopathy  Neurological: He is alert and oriented to person, place, and time  He has normal strength  No sensory deficit  He exhibits normal muscle tone  GCS eye subscore is 4  GCS verbal subscore is 5   GCS motor subscore is 6  Reflex Scores:       Patellar reflexes are 2+ on the right side and 2+ on the left side  Achilles reflexes are 2+ on the right side and 2+ on the left side  Strength 5/5 in LE bilaterally at hip/knee/ankle in flexion/extension  Sensation intact to light touch in LE bilaterally in L1-L5  Skin: Skin is warm, dry and intact  No rash noted  He is not diaphoretic  No erythema  Psychiatric: He has a normal mood and affect  His speech is normal and behavior is normal    Nursing note and vitals reviewed  ED Medications  Medications   naproxen (NAPROSYN) tablet 500 mg (not administered)       Diagnostic Studies  Results Reviewed     None                 No orders to display              Procedures  Procedures       Phone Contacts  ED Phone Contact    ED Course  ED Course        MDM  Number of Diagnoses or Management Options  Lumbar strain, initial encounter: new and does not require workup  Seborrheic dermatitis of scalp: new and does not require workup     Amount and/or Complexity of Data Reviewed  Decide to obtain previous medical records or to obtain history from someone other than the patient: yes  Review and summarize past medical records: yes    Risk of Complications, Morbidity, and/or Mortality  Presenting problems: moderate  Diagnostic procedures: minimal  Management options: low  General comments: I discussed results of the diagnostic workup with the patient  Reviewed relevant findings and likely diagnosis:   1  Patient's back pain is most consistent with an acute lumbar myofascial strain without evidence of any acute neurologic compromise and without any preceding trauma or injury  No identifiable red flag signs/symptoms related patient's back pain  No indication for acute imaging at this time  Patient with acute non-traumatic lumbar back pain without neurological deficit   No hx of fever/chills/weight loss/hx malignancy/iv drug use to suggest more dangerous/complicated etiology  No indications for emergency imaging at this time  Will treat conservatively and have patient follow up with primary care provided for re-evaluation  Patient has been instructed to return to the Emergency Department with any acute changes in condition  2   Rash is largley nonspecific but somewhat more suggestive of seborrheic dermatitis given its location and appearance of the rash  Reviewed treatment plan:   1  Patient has taken diclofenac previously as anti-inflammatory for back pain and states this has had reasonably good effect  I will prescribe a short course of this for him as well as methocarbamol as he states this has been effective for him as well  [place] PDMP checked prior to prescription of this medication  2   Will recommend coal-tar shampoo for suspected seborrheic dermatitis as first line agent for this condition  Reviewed follow-up plan:  Has PMD follow-up scheduled in March 2018 but I will advise him to follow up in approximately 10 days for further evaluation  Reviewed ED return precautions: return to ED for new acute neurologic sx in LE (weakness/paresthesias/incontinence)  All questions answered prior to discharge  Patient expressed understanding and agreed to plan          CritCare Time    Disposition  Final diagnoses:   Lumbar strain, initial encounter   Seborrheic dermatitis of scalp     Time reflects when diagnosis was documented in both MDM as applicable and the Disposition within this note     Time User Action Codes Description Comment    12/27/2017  3:15 PM Jessy Benitez 68 [B90 606K] Lumbar strain, initial encounter     12/27/2017  3:16 PM Sadiq Diver Add [S00 01XA] Excoriation of scalp     12/27/2017  3:24 PM Sadiq Diver Remove [S00 01XA] Excoriation of scalp     12/27/2017  3:24 PM Sadiq Diver Add [L21 9] Seborrheic dermatitis of scalp       ED Disposition     ED Disposition Condition Comment    Discharge  Vanessa Marsh discharge to home/self care  Condition at discharge: Good        Follow-up Information     Follow up With Specialties Details Why Contact Info    Pippa Velasquez PA-C Family Medicine Schedule an appointment as soon as possible for a visit in 10 days For further evaluation [facility] [address] [facility], [place] [address] [phone]          Patient's Medications   Discharge Prescriptions    DICLOFENAC SODIUM (VOLTAREN) 50 MG EC TABLET    Take 1 tablet by mouth 2 (two) times a day as needed (back pain)       Start Date: 12/27/2017End Date: --       Order Dose: 50 mg       Quantity: 30 tablet    Refills: 0     No discharge procedures on file      ED Provider  Electronically Signed by           [employer] [facility], DO  12/27/17 9775

## 2017-12-27 NOTE — DISCHARGE INSTRUCTIONS
You should use a coal-tar based shampoo for treatment of your scalp and facial rash according to the package instructions  Low Back Strain, Ambulatory Care   GENERAL INFORMATION:   Low back strain  is an injury to your lower back muscles or tendons  Tendons are strong tissues that connect muscles to bones  The lower back supports most of your body weight and helps you move, twist, and bend  Low back strain is usually caused by activities that increase stress on the lower back, such as exercise or injury  Common symptoms include the following:   · Low back pain or muscle spasms    · Stiffness or limited movement    · Pain that goes down to the buttocks, groin, or legs    · Pain that is worse with activity  Seek immediate care for the following symptoms:   · A pop in your lower back    · Increased swelling or pain in your lower back    · Trouble moving your legs    · Numbness in your legs  Treatment for low back strain:   · NSAIDs  help decrease swelling and pain or fever  This medicine is available with or without a doctor's order  NSAIDs can cause stomach bleeding or kidney problems in certain people  If you take blood thinner medicine, always ask your healthcare provider if NSAIDs are safe for you  Always read the medicine label and follow directions  · Muscle relaxers  help decrease muscle spasms pain  · Prescription pain medicine  may be given  Ask how to take this medicine safely  Manage your symptoms:   · Rest  in bed after your injury  Slowly start to increase your activity as the pain decreases, or as directed  · Apply ice  on your lower back for 15 to 20 minutes every hour or as directed  Use an ice pack, or put crushed ice in a plastic bag  Cover it with a towel  Ice helps prevent tissue damage and decreases swelling and pain  You can alternate ice and heat  · Apply heat  on your lower back for 20 to 30 minutes every 2 hours for as many days as directed   Heat helps decrease pain and muscle spasms  Prevent another low back strain:   · Use proper body mechanics  ¨ Bend at the hips and knees when you  objects  Do not bend from the waist  Use your leg muscles as you lift the load  Do not use your back  Keep the object close to your chest as you lift it  Try not to twist or lift anything above your waist     ¨ Change your position often when you stand for long periods of time  Rest one foot on a small box or footrest, and then switch to the other foot often  ¨ Try not to sit for long periods of time  When you do, sit in a straight-backed chair with your feet flat on the floor  Never reach, pull, or push while you are sitting  · Exercise regularly  Warm up before you exercise  Do exercises that strengthen your back muscles  Ask about the best exercise plan for you  · Maintain a healthy weight  Ask your healthcare provider how much you should weigh  Ask him to help you create a weight loss plan if you are overweight  Follow up with your healthcare provider as directed:  Write down your questions so you remember to ask them during your visits  CARE AGREEMENT:   You have the right to help plan your care  Learn about your health condition and how it may be treated  Discuss treatment options with your caregivers to decide what care you want to receive  You always have the right to refuse treatment  The above information is an  only  It is not intended as medical advice for individual conditions or treatments  Talk to your doctor, nurse or pharmacist before following any medical regimen to see if it is safe and effective for you  © 2014 [address] is for End User's use only and may not be sold, redistributed or otherwise used for commercial purposes  All illustrations and images included in CareNotes® are the copyrighted property of [employer]  or Clement Heaton            Seborrheic Dermatitis   AMBULATORY CARE:   Seborrheic dermatitis  is a skin condition that causes a rash and flaking, scaling skin  The condition usually affects hairy areas of the body, such as the scalp  Your face, ears, chest, groin, or back may be affected  Seborrheic dermatitis can happen at any age  The condition often goes away on its own in infants, but it may return during adolescence  Seborrheic dermatitis may be caused by a fungal infection, immune system problems, or hormone changes  Common signs and symptoms: You may have symptoms during the winter but not during the summer  Stress or a lack of sleep can make your symptoms worse  You may have any of the following:  · Skin flakes, or red, itching, or stinging skin    · Scaly patches (scales) of skin that are also greasy    · White or yellow crust on the skin or eyelids    · Scaling on the scalp commonly known as dandruff  Contact your healthcare provider if:   · You have new or worsening symptoms  · Your symptoms make it difficult for you to do your daily activities  · Your symptoms do not improve even after treatment  · You have questions or concerns about your condition or care  Treatment  may not be needed  The following are commonly used when seborrheic dermatitis needs to be treated:  · Medicines  may be given to treat a fungal or bacterial infection  You may also need a steroid medicine  You may be given these medicines in pill form or in a cream to apply to your skin  · Take your medicine as directed  Contact your healthcare provider if you think your medicine is not helping or if you have side effects  Tell him or her if you are allergic to any medicine  Keep a list of the medicines, vitamins, and herbs you take  Include the amounts, and when and why you take them  Bring the list or the pill bottles to follow-up visits  Carry your medicine list with you in case of an emergency  · Dandruff shampoo  may help control symptoms   The shampoo may be used on your scalp and hair, and also on your skin  Your healthcare provider may recommend that you start with a mild dandruff shampoo  You may need to use a stronger shampoo or alternate shampoos if your symptoms do not improve  Ask which kind is right for your hair  Some shampoos used to manage seborrheic dermatitis contain coal tar or other ingredients that may discolor light hair  · Light therapy  may be used if other treatments do not work  You will receive a medicine to make your skin more sensitive to light  Then your skin will be put under an ultraviolet light  The light helps control skin growth  Manage your symptoms:   · Wash your skin and hair often  Your healthcare provider can tell you how often to wash  You may need to wash your hair every day or two, or once per week, depending on the kind of hair you have  Apply a gentle moisturizer to your skin after you wash  Use mild soaps and moisturizers  Do not use any product that contains alcohol  Alcohol can dry your skin and make your symptoms worse  · Protect your scalp if you use coal tar shampoo  Coal tar shampoo can make your skin more sensitive to light  Wear a hat when you are outside  Do not use tanning beds or sun lamps  · Remove scales after you soften them  Do not pull on the scales  This can spread infection and may cause hair loss  Apply mineral oil or olive oil to the skin and let it sit for 1 hour  Then use a soft-bristled brush to remove the scales or shampoo your hair  · Clean your eyelids, if needed  Use baby shampoo to wash your eyelids every night  Use a cotton swab to remove scales  A warm compress may also help control symptoms  To make a warm compress, soak a soft washcloth in warm water  Wring out the extra water and apply the cloth to your eyelid for a few minutes  · Consider shaving off your beard or mustache  Your symptoms may be worse under your beard or mustache   Shaving may help reduce your symptoms and prevent them from returning in this area   Follow up with your healthcare provider as directed:  Write down your questions so you remember to ask them during your visits  © 2017 [address] Information is for End User's use only and may not be sold, redistributed or otherwise used for commercial purposes  All illustrations and images included in CareNotes® are the copyrighted property of [employer]  or [address]  The above information is an  only  It is not intended as medical advice for individual conditions or treatments  Talk to your doctor, nurse or pharmacist before following any medical regimen to see if it is safe and effective for you

## 2018-01-03 ENCOUNTER — GENERIC CONVERSION - ENCOUNTER (OUTPATIENT)
Dept: OTHER | Facility: OTHER | Age: 58
End: 2018-01-03

## 2018-01-03 ENCOUNTER — APPOINTMENT (OUTPATIENT)
Dept: FAMILY MEDICINE CLINIC | Facility: CLINIC | Age: 58
End: 2018-01-03
Payer: COMMERCIAL

## 2018-01-03 PROCEDURE — 99213 OFFICE O/P EST LOW 20 MIN: CPT | Performed by: FAMILY MEDICINE

## 2018-01-10 ENCOUNTER — GENERIC CONVERSION - ENCOUNTER (OUTPATIENT)
Dept: OTHER | Facility: OTHER | Age: 58
End: 2018-01-10

## 2018-01-12 VITALS
WEIGHT: 226 LBS | BODY MASS INDEX: 44.37 KG/M2 | HEART RATE: 62 BPM | DIASTOLIC BLOOD PRESSURE: 80 MMHG | HEIGHT: 60 IN | SYSTOLIC BLOOD PRESSURE: 136 MMHG

## 2018-01-12 VITALS
OXYGEN SATURATION: 97 % | HEIGHT: 60 IN | BODY MASS INDEX: 44.76 KG/M2 | TEMPERATURE: 98.6 F | WEIGHT: 228 LBS | SYSTOLIC BLOOD PRESSURE: 124 MMHG | DIASTOLIC BLOOD PRESSURE: 80 MMHG | RESPIRATION RATE: 17 BRPM | HEART RATE: 80 BPM

## 2018-01-12 NOTE — RESULT NOTES
Message   LFTs normalized  Verified Results  (1) BASIC METABOLIC PROFILE [id number] 03:28PM Gail Avila    Order Number: [id number]  TW Order Number: [id number] Order Number: [id number]     Test Name Result Flag Reference   GLUCOSE,RANDM 81 mg/dL     If the patient is fasting, the ADA then defines impaired fasting glucose as > 100 mg/dL and diabetes as > or equal to 123 mg/dL  SODIUM 141 mmol/L  136-145   POTASSIUM 4 0 mmol/L  3 5-5 3   CHLORIDE 103 mmol/L  100-108   CARBON DIOXIDE 29 mmol/L  21-32   ANION GAP (CALC) 9 mmol/L  4-13   BLOOD UREA NITROGEN 11 mg/dL  5-25   CREATININE 0 97 mg/dL  0 60-1 30   Standardized to IDMS reference method   CALCIUM 8 6 mg/dL  8 3-10 1   eGFR Non-African American      >60 0 ml/min/1 [address] [facility] Disease Education Program recommendations are as follows:  GFR calculation is accurate only with a steady state creatinine  Chronic Kidney disease less than 60 ml/min/1 73 sq  meters  Kidney failure less than 15 ml/min/1 73 sq  meters       (1) HEPATIC FUNCTION PANEL [id number] 03:28PM Gail Avila    Order Number: [id number]  TW Order Number: [id number] Order Number: [id number]     Test Name Result Flag Reference   ALBUMIN 3 2 g/dL L 3 5-5 0   ALK PHOSPHATAS 94 U/L     ALT (SGPT) 33 U/L  12-78   AST(SGOT) 19 U/L  5-45   BILI, DIRECT 0 09 mg/dL  0 00-0 20   BILI, TOTAL 0 20 mg/dL  0 20-1 00   TOTAL PROTEIN 6 8 g/dL  6 4-8 2

## 2018-01-13 VITALS
BODY MASS INDEX: 46.16 KG/M2 | WEIGHT: 235.13 LBS | HEIGHT: 60 IN | SYSTOLIC BLOOD PRESSURE: 116 MMHG | DIASTOLIC BLOOD PRESSURE: 78 MMHG | TEMPERATURE: 98.3 F

## 2018-01-13 VITALS
SYSTOLIC BLOOD PRESSURE: 128 MMHG | WEIGHT: 239.13 LBS | DIASTOLIC BLOOD PRESSURE: 88 MMHG | HEIGHT: 60 IN | BODY MASS INDEX: 46.95 KG/M2 | TEMPERATURE: 97.6 F

## 2018-01-13 NOTE — MISCELLANEOUS
Message   Recorded as Task   Date: 05/30/2017 12:24 PM, Created By: Catherine Byrd   Task Name: Miscellaneous   Assigned To: Terence Maria   Regarding Patient: Catherine Carnes, Status: In Progress   Comment:    Erica Arroyo - 30 May 2017 12:24 PM     TASK CREATED  Pt just had OV w/[facility] in [place] and was to call back with name of medication that worked for him  He said it is Duexis 600 mg, taken as needed  Pt can be reached at [phone]  CandaceChen - 30 May 2017 1:04 PM     TASK IN PROGRESS   [facility] - 30 May 2017 1:04 PM     TASK REASSIGNED: Previously Assigned To Terence Maria  Does he need a refill? Jeremias Parsons - 30 May 2017 2:39 PM     TASK REPLIED TO: Previously Assigned To Jeremias Parsons                      Unfortunately, I don't think his insurance covers that prescription   [facility] 2[facility]52 [facility]     TASK EDITED  Verified w/ drug rep, duexis is not covered by medicare  Attempted to call patient, LMTCB  [facility] - 31 May 2017 9:37 AM     TASK EDITED  Attempted to call patient, LMCTB   Erica Arroyo - 31 May 2017 11:09 AM     TASK EDITED  Pt returned call and can be reached at [phone]  CandaceChen - 31 May 2017 1:01 PM     TASK IN PROGRESS   [facility] - 31 May 2017 1:01 PM     TASK EDITED  Attempted to call patient, Wendy Helton - 31 May 2017 1:28 PM     TASK EDITED  Pt returned call explaining he went out to walk the dog  He can be reached until 5:30 today at [phone]  [place] - 31 May 2017 1:34 PM     TASK IN PROGRESS   [facility] 31 May 2017 1:35 PM     TASK EDITED  Patient informed that duexis is not covered by ins  He said that is no problem, that he received samples before  He was pleasant and appreciative of the call  Active Problems    1  Acne (706 1) (L70 9)   2  Acute low back pain (724 2) (M54 5)   3   Acute pain due to trauma (338 11) (G89 11)   4  Arteriosclerotic coronary artery disease (414 00) (I25 10)   5  Asthma (493 90) (J45 909)   6  Bilateral low back pain without sciatica (724 2) (M54 5)   7  Chest pain (786 50) (R07 9)   8  Chronic obstructive pulmonary disease (496) (J44 9)   9  Chronic serous otitis media (381 10) (H65 20)   10  Diarrhea (787 91) (R19 7)   11  Diastolic dysfunction (165 4) (I51 9)   12  Disc degeneration, lumbar (722 52) (M51 36)   13  Elevated blood sugar (790 29) (R73 9)   14  Encounter for screening for malignant neoplasm of colon (V76 51) (Z12 11)   15  Erectile dysfunction (607 84) (N52 9)   16  Eustachian tube dysfunction (381 81) (H69 80)   17  Exposure to varicella (V01 71) (Z20 820)   18  Heart burn (787 1) (R12)   19  Hypercholesterolemia (272 0) (E78 00)   20  Hypertension (401 9) (I10)   21  Impacted cerumen of left ear (380 4) (H61 22)   22  Lumbar compression fracture (805 4) (S32 000A)   23  Lumbar radiculopathy (724 4) (M54 16)   24  Lumbar spondylosis (721 3) (M47 816)   25  Morbid or severe obesity due to excess calories (278 01) (E66 01)   26  Need for influenza vaccination (V04 81) (Z23)   27  Nonalcoholic fatty liver disease (571 8) (K76 0)   28  Obstructive sleep apnea (327 23) (G47 33)   29  Occult blood positive stool (792 1) (R19 5)   30  Old myocardial infarction (412) (I25 2)   31  Osteoporosis without current pathological fracture, unspecified osteoporosis type    (733 00) (M81 0)   32  Oxycodone use disorder, 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)   33  Persistent disorder of initiating or maintaining sleep (307 42) (G47 00)   34  Pulmonary hypertension (416 8) (I27 2)   35  Pulmonary nodule seen on imaging study (793 11) (R91 1)   36  Rectal bleeding (569 3) (K62 5)   37  Sacroiliitis (720 2) (M46 1)   38  Scalp lesion (709 9) (L98 9)   39  Screening for diabetes mellitus (DM) (V77 1) (Z13 1)   40  Seborrheic dermatitis of scalp (690 18) (L21 9)   41  Shortness of breath (786 05) (R06 02)   42  Sprain or strain of hamstring muscle (843 8)   43  Tobacco use (305 1) (Z72 0)   44  Traumatic compression fracture of T4 thoracic vertebra (805 2) (S22 040A)   45  Visit for pre-operative examination (V72 84) (Z01 818)   46  Visual impairment in both eyes (369 3) (H54 3)   47  Vitamin D deficiency (268 9) (E55 9)   48  Wound Bed Appearance Granulated    Current Meds   1  Albuterol Sulfate (2 5 MG/3ML) 0 083% Inhalation Nebulization Solution; USE 1 UNIT   DOSE VIA NEBULIZER  4 TIMES A DAY AS NEEDED; Therapy: 87Mox9116 to (Evaluate:15Jan2016)  Requested for: 13Srd5027; Last   Rx:68Pwo8494 Ordered   2  Aspir-81 81 MG Oral Tablet Delayed Release; TAKE 1 TABLET DAILY; Therapy: 51VLO3112 to (Evaluate:25Jun2017)  Requested for: 47PYU0081; Last   Rx:30Jun2016 Ordered   3  Back Support L/XL Miscellaneous; USE AS DIRECTED; Therapy: 96TYM1346 to (Last Rx:25Feb2016)  Requested for: 06Pqc3303 Ordered   4  Diclofenac Potassium 50 MG Oral Tablet; Take 1 tablet bid with meals when necessary; Therapy: 97WKS1734 to (Evaluate:89Dlm2390)  Requested for: 79XQW7903; Last   Rx:30May2017 Ordered   5  Furosemide 20 MG Oral Tablet; Take 1 tablet twice daily  Requested for: 60Jdv0909;   Last Rx:21Feb2017 Ordered   6  Gabapentin 400 MG Oral Capsule; TAKE 1 CAPSULE 3 TIMES DAILY; Therapy: 92INL3872 to (Evaluate:14Axe5401)  Requested for: 34PWF5312; Last   Rx:30May2017 Ordered   7  Lisinopril 40 MG Oral Tablet; TAKE 1 TABLET DAILY FOR BLOOD PRESSURE; Therapy: 41BWA8798 to (Evaluate:33Npa6461)  Requested for: 20YES6908; Last   Rx:06Feb2017 Ordered   8  Metoprolol Tartrate 50 MG Oral Tablet; TAKE 1 TABLET EVERY 12 HOURS DAILY; Therapy: (Recorded:55Krf5615) to Recorded   9  Oxycodone-Acetaminophen 5-325 MG Oral Tablet; TAKE 1 TABLET  DAILY AS NEEDED   FOR PAIN;   Therapy: 23XBP3680 to (Evaluate:22Mar2017); Last Rx:37Phm7687 Ordered   10   Potassium Chloride ER 10 MEQ Oral Tablet Extended Release; take 1 tablet by mouth    once daily; Therapy: 61AYD9405 to (Evaluate:12Apr2017)  Requested for: 08EBG9253; Last    Rx:14Oct2016 Ordered   11  Pravastatin Sodium 80 MG Oral Tablet; TAKE 1 TABLET DAILY; Therapy: 02QJJ6655 to (Mitzi Moore)  Requested for: 90MRG1039; Last    Rx:45Hue9600 Ordered   12  Spiriva HandiHaler 18 MCG Inhalation Capsule; INHALE CONTENTS OF 1 CAPSULE    ONCE DAILY; Therapy: 33GGU1856 to (Evaluate:33Aeu4587)  Requested for: 85Cag5554; Last    Rx:69Eaq6773 Ordered   13  Ventolin  (90 Base) MCG/ACT Inhalation Aerosol Solution; INHALE 2 PUFFS    EVERY 4 HOURS AS NEEDED; Therapy: 77RPK7193 to (Evaluate:07Fjx3465)  Requested for: 52JJV4468; Last    Rx:18Jan2016 Ordered   14  Vitamin D (Ergocalciferol) 92665 UNIT Oral Capsule; 1 cap 2x weekly for 6 weeks; Therapy: 75FQX6498 to (Evaluate:22Apr2017)  Requested for: 28NMM2275; Last    Rx:29Mar2017 Ordered   15  Zolpidem Tartrate 5 MG Oral Tablet; TAKE 1 TABLET AT BEDTIME AS NEEDED FOR    SLEEP; Therapy: 17CQM3564 to (Evaluate:14Jun2017); Last Rx:22Mar2017 Ordered    Allergies    1   Penicillins    Signatures   Electronically signed by : Eduar James, ; May 31 2017  1:36PM EST                       (Author)

## 2018-01-13 NOTE — MISCELLANEOUS
Message   Recorded as Task   Date: 02/21/2017 09:50 AM, Created By: Avery De La Cruz   Task Name: Med Renewal Request   Assigned To: Chen Maria   Regarding Patient: Christiano Vasquez, Status: Active   CommentCoratul Owens - 21 Feb 2017 9:50 AM     TASK CREATED  Patient called and states that he needs refills on his gabapentin and diclofenac sent to his pharmacy  Thank you   Mara Reynaldo - 21 Feb 2017 10:17 AM     TASK REPLIED TO: Previously Assigned To Jimmie Herbert                      Refills sent        Active Problems    1  Acne (706 1) (L70 9)   2  Acute low back pain (724 2) (M54 5)   3  Acute pain due to trauma (338 11) (G89 11)   4  Arteriosclerotic coronary artery disease (414 00) (I25 10)   5  Asthma (493 90) (J45 909)   6  Bilateral low back pain without sciatica (724 2) (M54 5)   7  Chest pain (786 50) (R07 9)   8  Chronic obstructive pulmonary disease (496) (J44 9)   9  Chronic serous otitis media (381 10) (H65 20)   10  Diarrhea (787 91) (R19 7)   11  Diastolic dysfunction (268 9) (I51 9)   12  Disc degeneration, lumbar (722 52) (M51 36)   13  Elevated blood sugar (790 29) (R73 9)   14  Encounter for screening for malignant neoplasm of colon (V76 51) (Z12 11)   15  Erectile dysfunction (607 84) (N52 9)   16  Eustachian tube dysfunction (381 81) (H69 80)   17  Exposure to varicella (V01 71) (Z20 820)   18  Heart burn (787 1) (R12)   19  Hypercholesterolemia (272 0) (E78 00)   20  Hypertension (401 9) (I10)   21  Impacted cerumen of left ear (380 4) (H61 22)   22  Lumbar compression fracture (805 4) (S32 000A)   23  Lumbar radiculopathy (724 4) (M54 16)   24  Lumbar spondylosis (721 3) (M47 816)   25  Morbid or severe obesity due to excess calories (278 01) (E66 01)   26  Need for influenza vaccination (V04 81) (Z23)   27  Nonalcoholic fatty liver disease (571 8) (K76 0)   28  Obstructive sleep apnea (327 23) (G47 33)   29  Occult blood positive stool (792 1) (R19 5)   30   Old myocardial infarction (412) (I25 2)   31  Oxycodone use disorder, 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)   32  Persistent disorder of initiating or maintaining sleep (307 42) (G47 00)   33  Pulmonary hypertension (416 8) (I27 2)   34  Pulmonary nodule seen on imaging study (793 11) (R91 1)   35  Rectal bleeding (569 3) (K62 5)   36  Sacroiliitis (720 2) (M46 1)   37  Scalp lesion (709 9) (L98 9)   38  Screening for diabetes mellitus (DM) (V77 1) (Z13 1)   39  Seborrheic dermatitis of scalp (690 18) (L21 9)   40  Shortness of breath (786 05) (R06 02)   41  Sprain or strain of hamstring muscle (843 8)   42  Tobacco use (305 1) (Z72 0)   43  Traumatic compression fracture of T4 thoracic vertebra (805 2) (S22 040A)   44  Visit for pre-operative examination (V72 84) (Z01 818)   45  Visual impairment in both eyes (369 3) (H54 3)   46  Vitamin D deficiency (268 9) (E55 9)   47  Wound Bed Appearance Granulated    Current Meds   1  Albuterol Sulfate (2 5 MG/3ML) 0 083% Inhalation Nebulization Solution; USE 1 UNIT   DOSE VIA NEBULIZER  4 TIMES A DAY AS NEEDED; Therapy: 99Kia3867 to (Evaluate:15Jan2016)  Requested for: 63Hte8203; Last   Rx:42Szt8557 Ordered   2  Aspir-81 81 MG Oral Tablet Delayed Release; TAKE 1 TABLET DAILY; Therapy: 29VCI8723 to (Evaluate:25Jun2017)  Requested for: 46GJW1979; Last   Rx:30Jun2016 Ordered   3  Back Support L/XL Miscellaneous; USE AS DIRECTED; Therapy: 19VKD7431 to (Last Rx:30Aya9515)  Requested for: 67Gqo7712 Ordered   4  Diclofenac Potassium 50 MG Oral Tablet; Take 1 tablet bid with meals when necessary; Therapy: 30UIT9435 to (Evaluate:18Nov2017)  Requested for: 63Vod1660; Last   Rx:93Rgg0420 Ordered   5  Furosemide 20 MG Oral Tablet; Take 1 tablet twice daily  Requested for: 51KPU3912; Last   Rx:14Oct2016 Ordered   6  Gabapentin 300 MG Oral Capsule; TAKE 1 CAPSULE 3 TIMES DAILY; Therapy: 84USZ7733 to (Evaluate:38Euq0614)  Requested for: 21Feb2017; Last   Rx:21Feb2017 Ordered   7   Lisinopril 40 MG Oral Tablet; TAKE 1 TABLET DAILY FOR BLOOD PRESSURE; Therapy: 26WXV6340 to (Evaluate:21Srl8934)  Requested for: 96FVB6349; Last   Rx:06Feb2017 Ordered   8  Metoprolol Tartrate 50 MG Oral Tablet (Lopressor); take 1 tab every AM  Requested for:   23WTU0658; Last Rx:25Jan2017 Ordered   9  Oxycodone-Acetaminophen 5-325 MG Oral Tablet; TAKE 1 TABLET  DAILY AS NEEDED   FOR PAIN;   Therapy: 15NIW8835 to (Evaluate:33Vmm0054); Last Rx:25Jan2017 Ordered   10  Potassium Chloride ER 10 MEQ Oral Tablet Extended Release; take 1 tablet by mouth    once daily; Therapy: 44UBY1931 to (Evaluate:12Apr2017)  Requested for: 87ABT5582; Last    Rx:14Oct2016 Ordered   11  Pravastatin Sodium 40 MG Oral Tablet; TAKE 1 TABLET Bedtime; Therapy: 80SAO5725 to (Rafal Sleeper)  Requested for: 02UJH5474; Last    Rx:14Oct2016 Ordered   12  Spiriva HandiHaler 18 MCG Inhalation Capsule; INHALE CONTENTS OF 1 CAPSULE    ONCE DAILY; Therapy: 70VIE9739 to (Evaluate:51Mon8267)  Requested for: 09FJX3836; Last    Rx:06Feb2017 Ordered   13  Symbicort 160-4 5 MCG/ACT Inhalation Aerosol; INHALE 2 PUFFS TWICE DAILY  RINSE MOUTH AFTER USE; Therapy: 71BQH8538 to (Evaluate:39Squ3322)  Requested for: 63HAT0464; Last    Rx:30Jun2016 Ordered   14  Ventolin  (90 Base) MCG/ACT Inhalation Aerosol Solution; INHALE 2 PUFFS    EVERY 4 HOURS AS NEEDED; Therapy: 69BWX1298 to (Evaluate:07Avo0906)  Requested for: 37MFH9703; Last    Rx:18Jan2016 Ordered   15  Zolpidem Tartrate 5 MG Oral Tablet; TAKE 1 TABLET AT BEDTIME AS NEEDED FOR    SLEEP; Therapy: 81ACV8015 to (Evaluate:54Cbs9764); Last Rx:25Jan2017 Ordered    Allergies    1   Penicillins    Signatures   Electronically signed by : Mitchell Earl, ; Feb 21 2017 11:08AM EST                       (Author)

## 2018-01-13 NOTE — CONSULTS
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" States issues were obesity, and   He sees Wilfredo Ayala PA-C in [place] for his DOT roby  Has Hx of LBP after a slip and fall, Dx with L-2 compression fracture, did not receive bone cement procedure or surgery  Is getting PTx and on oral medications from Pain Medicine  Drives 26 foot "[employer]" , prior to was [employer]   Currently back is good   shoveled some snow today  On a beta blocker and ACEI, takes opioid analgesic usually when he was working part time at [employer]  Recently helped move a refrigerator  Pt  has actually opened a case with [facility]  Has ALEXYS and follows with a specialist for this   denies excessive daytime sedation  Review of Systems    Cardiovascular: No complaints of chest pain, no palpitations, no leg claudication or lower extremity edema  Respiratory: No complaints of shortness of breath, no wheezing, no cough  Gastrointestinal: No complaints of abdominal pain, no constipation, no nausea or vomiting, no diarrhea or bloody stools  Genitourinary: No complaints of dysuria or incontinence, no hesitancy, no nocturia  Musculoskeletal: does not awaken with pain, but as noted in HPI  Neurological: no numbness and no tingling  Endocrine: no muscle weakness and no feelings of weakness  ROS reviewed  Active Problems    1  Acne (706 1) (L70 9)   2  Acute low back pain (724 2) (M54 5)   3  Acute pain due to trauma (338 11) (G89 11)   4  Arteriosclerotic coronary artery disease (414 00) (I25 10)   5  Asthma (493 90) (J45 909)   6  Bilateral low back pain without sciatica (724 2) (M54 5)   7  Chest pain (786 50) (R07 9)   8  Chronic obstructive pulmonary disease (496) (J44 9)   9  Chronic serous otitis media (381 10) (H65 20)   10  Diarrhea (787 91) (R19 7)   11  Diastolic dysfunction (339 3) (I51 9)   12  Disc degeneration, lumbar (722 52) (M51 36)   13  Elevated blood sugar (790 29) (R73 9)   14  Encounter for screening for malignant neoplasm of colon (V76 51) (Z12 11)   15  Erectile dysfunction (607 84) (N52 9)   16  Eustachian tube dysfunction (381 81) (H69 80)   17  Exposure to varicella (V01 71) (Z20 820)   18  Heart burn (787 1) (R12)   19  Hypercholesterolemia (272 0) (E78 00)   20  Hypertension (401 9) (I10)   21  Impacted cerumen of left ear (380 4) (H61 22)   22  Lumbar compression fracture (805 4) (S32 000A)   23  Lumbar radiculopathy (724 4) (M54 16)   24  Lumbar spondylosis (721 3) (M47 816)   25  Morbid or severe obesity due to excess calories (278 01) (E66 01)   26  Need for influenza vaccination (V04 81) (Z23)   27  Nonalcoholic fatty liver disease (571 8) (K76 0)   28  Obstructive sleep apnea (327 23) (G47 33)   29  Occult blood positive stool (792 1) (R19 5)   30  Old myocardial infarction (412) (I25 2)   31  Oxycodone use disorder, 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)   32  Persistent disorder of initiating or maintaining sleep (307 42) (G47 00)   33  Pulmonary hypertension (416 8) (I27 2)   34  Pulmonary nodule seen on imaging study (793 11) (R91 1)   35  Rectal bleeding (569 3) (K62 5)   36  Sacroiliitis (720 2) (M46 1)   37  Scalp lesion (709 9) (L98 9)   38  Screening for diabetes mellitus (DM) (V77 1) (Z13 1)   39  Seborrheic dermatitis of scalp (690 18) (L21 9)   40   Shortness of breath (786 05) (R06 02)   41  Sprain or strain of hamstring muscle (843 8)   42  Tobacco use (305 1) (Z72 0)   43  Traumatic compression fracture of T4 thoracic vertebra (805 2) (S22 040A)   44  Visit for pre-operative examination (V72 84) (Z01 818)   45  Visual impairment in both eyes (369 3) (H54 3)   46  Vitamin D deficiency (268 9) (E55 9)   47  Wound Bed Appearance Granulated    Past Medical History    1  History of Acute upper respiratory infection (465 9) (J06 9)   2  History of Bilateral otitis media (382 9) (H66 93)   3  Old myocardial infarction (412) (I25 2)    The active problems and past medical history were reviewed and updated today  Surgical History    1  History of Cardiac Cath Procedure Summary   2  History of Denial Of Any Significant Medical History   3  History of Oral Surgery Tooth Extraction   4  History of Tonsillectomy    The surgical history was reviewed and updated today  Family History  Mother    1  Family history of diabetes mellitus (V18 0) (Z83 3)  Family History    2  Family history of Back problem   3  Family history of Diabetes Mellitus (250 00)   4  Family history of cardiac disorder (V17 49) (Z82 49)   5  Family history of cerebrovascular accident (V17 1) (Z82 3)   6  Family history of hypertension (V17 49) (Z82 49)   7  Family history of sleep apnea (V19 8) (Z82 0)   8  Family history of High cholesterol    The family history was reviewed and updated today  Social History    · Being A Social Drinker   · Caffeine Use   · Current Every Day Smoker (305 1)   · Denied: History of Drug Use   · Marital History -    · Tobacco use (305 1) (Z72 0)  The social history was reviewed and is unchanged  Current Meds   1  Albuterol Sulfate (2 5 MG/3ML) 0 083% Inhalation Nebulization Solution; USE 1 UNIT   DOSE VIA NEBULIZER  4 TIMES A DAY AS NEEDED; Therapy: 48Zfh7967 to (Evaluate:31Hfx9591)  Requested for: 84Sta1345; Last   Rx:45Fdm2458 Ordered   2   Aspir-81 81 MG Oral Tablet Delayed Release; TAKE 1 TABLET DAILY; Therapy: 69GXG6362 to (Evaluate:25Jun2017)  Requested for: 05HYZ6365; Last   Rx:35Bfh9578 Ordered   3  Back Support L/XL Miscellaneous; USE AS DIRECTED; Therapy: 11MPC6484 to (Last Rx:25Feb2016)  Requested for: 25Feb2016 Ordered   4  Diclofenac Potassium 50 MG Oral Tablet; Take 1 tablet bid with meals when necessary; Therapy: 66RRM7743 to (Evaluate:18Nov2017)  Requested for: 21Feb2017; Last   Rx:21Feb2017 Ordered   5  Furosemide 20 MG Oral Tablet; Take 1 tablet twice daily  Requested for: 21Feb2017;   Last Rx:21Feb2017 Ordered   6  Gabapentin 300 MG Oral Capsule; TAKE 1 CAPSULE 3 TIMES DAILY; Therapy: 83TBS4713 to (Evaluate:90Art5895)  Requested for: 21Feb2017; Last   Rx:21Feb2017 Ordered   7  Lisinopril 40 MG Oral Tablet; TAKE 1 TABLET DAILY FOR BLOOD PRESSURE; Therapy: 12DNH8473 to (Evaluate:01Feb2018)  Requested for: 95JRK9946; Last   Rx:06Feb2017 Ordered   8  Metoprolol Tartrate 50 MG Oral Tablet (Lopressor); take 1 tab every AM  Requested for:   21Feb2017; Last Rx:21Feb2017 Ordered   9  Oxycodone-Acetaminophen 5-325 MG Oral Tablet; TAKE 1 TABLET  DAILY AS NEEDED   FOR PAIN;   Therapy: 96WYY6559 to (Evaluate:22Mar2017); Last Rx:30Kmi5872 Ordered   10  Potassium Chloride ER 10 MEQ Oral Tablet Extended Release; take 1 tablet by mouth    once daily; Therapy: 06XIV7551 to (Evaluate:12Apr2017)  Requested for: 88CRC8122; Last    Rx:14Qpm7239 Ordered   11  Pravastatin Sodium 40 MG Oral Tablet; TAKE 1 TABLET Bedtime; Therapy: 64MTR0430 to (Evaluate:85Top2736)  Requested for: 21Feb2017; Last    Rx:81Dtk6146 Ordered   12  Spiriva HandiHaler 18 MCG Inhalation Capsule; INHALE CONTENTS OF 1 CAPSULE    ONCE DAILY; Therapy: 82GCE9485 to (Evaluate:44Qyz6515)  Requested for: 21Feb2017; Last    Rx:21Feb2017 Ordered   13  Ventolin  (90 Base) MCG/ACT Inhalation Aerosol Solution; INHALE 2 PUFFS    EVERY 4 HOURS AS NEEDED;     Therapy: 03VGZ4742 to (Evaluate:03Vbv4932) Requested for: 18YIJ3594; Last    Rx:18Jan2016 Ordered   14  Zolpidem Tartrate 5 MG Oral Tablet; TAKE 1 TABLET AT BEDTIME AS NEEDED FOR    SLEEP; Therapy: 38UKY8475 to (Evaluate:21Jiq7476); Last Rx:25Jan2017 Ordered    The medication list was reviewed and updated today  Allergies    1  Penicillins    Vitals  Signs   Recorded: 00FDP1215 02:29PM   Heart Rate: 58  Systolic: 715  Diastolic: 90  Height: 5 ft   Weight: 233 lb   BMI Calculated: 45 5  BSA Calculated: 1 98    Physical Exam    Constitutional - General appearance: Abnormal  overweight  Musculoskeletal - Gait and station: Normal  Digits and nails: Normal  Muscle strength/tone: Normal    Neurologic - Cranial nerves: Normal  Reflexes: Abnormal  Deep tendon reflexes: 1+ right biceps, 1+ left biceps, 1+ right triceps, 1+ left triceps, 1+ right brachioradialis, 1+ left brachioradialis, 1+ right patella, 1+ left patella, 0 right ankle jerk, 0 left ankle jerk and Magallon's negative  Upper extremity peripheral neuro exam: Normal  Lower extremity peripheral neuro exam: Normal  Distal pulse examination findings: able to walk o heels and toes and squat W/O LOB  Psychiatric - Orientation to person, place, and time: Normal  Mood and affect: Normal       Results/Data  I personally reviewed the films/images/results in the office today  My interpretation follows  X-ray Review L-2 superior endplate compression fracture seen on 2017 films   "new as compared to 2011"  Diagnostic Review Vit D 17 3 in 2015        Future Appointments    Date/Time Provider Specialty Site   03/15/2017 03:40 PM Kiki Zarate DO Cardiology [facility] C[facility] [place]   03/22/2017 02:30 PM Blanca Sparks[address]   05/12/2017 01:00 PM Mil Davila DO Pain Management [facility]     Signatures   Electronically signed by : Diya Jones DO; Mar 10 2017  3:07PM EST                       (Author)

## 2018-01-13 NOTE — MISCELLANEOUS
Provider Comments  Provider Comments:   Ina Rosario did not show up to his scheduled appointment today  He was called, and his appointment was rescheduled for another day  He stated he had a  to attend and forgot to call and reschedule the appointment        Signatures   Electronically signed by : Bryce Vargas MD; May  9 2016  5:25PM EST                       (Author)    Electronically signed by : Bryce Vargas MD; May  9 2016  5:25PM EST                       (Author)

## 2018-01-14 VITALS
DIASTOLIC BLOOD PRESSURE: 74 MMHG | WEIGHT: 235.13 LBS | HEIGHT: 60 IN | HEART RATE: 56 BPM | SYSTOLIC BLOOD PRESSURE: 136 MMHG | BODY MASS INDEX: 46.16 KG/M2

## 2018-01-14 VITALS
HEIGHT: 60 IN | SYSTOLIC BLOOD PRESSURE: 110 MMHG | TEMPERATURE: 98.2 F | WEIGHT: 238 LBS | BODY MASS INDEX: 46.72 KG/M2 | DIASTOLIC BLOOD PRESSURE: 80 MMHG

## 2018-01-14 VITALS
DIASTOLIC BLOOD PRESSURE: 79 MMHG | WEIGHT: 247.13 LBS | BODY MASS INDEX: 45.2 KG/M2 | TEMPERATURE: 97.9 F | HEART RATE: 56 BPM | SYSTOLIC BLOOD PRESSURE: 149 MMHG | OXYGEN SATURATION: 98 %

## 2018-01-14 VITALS
WEIGHT: 248 LBS | HEART RATE: 62 BPM | HEIGHT: 60 IN | SYSTOLIC BLOOD PRESSURE: 116 MMHG | TEMPERATURE: 97.6 F | OXYGEN SATURATION: 98 % | DIASTOLIC BLOOD PRESSURE: 84 MMHG | BODY MASS INDEX: 48.69 KG/M2

## 2018-01-14 VITALS
HEART RATE: 82 BPM | WEIGHT: 234 LBS | TEMPERATURE: 98.6 F | RESPIRATION RATE: 17 BRPM | BODY MASS INDEX: 45.94 KG/M2 | OXYGEN SATURATION: 96 % | DIASTOLIC BLOOD PRESSURE: 90 MMHG | HEIGHT: 60 IN | SYSTOLIC BLOOD PRESSURE: 152 MMHG

## 2018-01-14 VITALS
WEIGHT: 235 LBS | OXYGEN SATURATION: 98 % | SYSTOLIC BLOOD PRESSURE: 138 MMHG | TEMPERATURE: 98.6 F | BODY MASS INDEX: 46.13 KG/M2 | RESPIRATION RATE: 17 BRPM | HEART RATE: 71 BPM | HEIGHT: 60 IN | DIASTOLIC BLOOD PRESSURE: 84 MMHG

## 2018-01-15 VITALS
HEART RATE: 58 BPM | BODY MASS INDEX: 45.75 KG/M2 | HEIGHT: 60 IN | SYSTOLIC BLOOD PRESSURE: 140 MMHG | WEIGHT: 233 LBS | DIASTOLIC BLOOD PRESSURE: 90 MMHG

## 2018-01-15 NOTE — MISCELLANEOUS
Message  GI Reminder Recall Kendall Aguirre:   Date: 10/27/2017   Dear Rosita Garrett:     Review of our records shows you are due for the following: Procedure follow up visit  Please call the following office to schedule your appointment:   [address] [facility] [address] [place] [address] [phone]  We look forward to hearing from you!      Sincerely,       [facility]      Signatures   Electronically signed by : Mag Medina, ; Oct 27 2017  1:41PM EST                       (Author)

## 2018-01-16 NOTE — MISCELLANEOUS
Message  PT CALLED TO REPORT THAT HIS INSURANCE WON'T COVER THE 10MG OXYCODONE  I LEFT A MESSAGE FOR HIS PHARMACY TO CALL ME AND LET ME KNOW IF THEY NEED A PRIOR AUTH AND WHERE WE NEED TO CALL  WAITING TO HEAR BACK FROM THEM  Active Problems    1  Acne (706 1) (L70 9)   2  Acute low back pain (724 2) (M54 5)   3  Acute pain due to trauma (338 11) (G89 11)   4  Arteriosclerotic coronary artery disease (414 00) (I25 10)   5  Asthma (493 90) (J45 909)   6  Bilateral low back pain without sciatica (724 2) (M54 5)   7  Chest pain (786 50) (R07 9)   8  Chronic obstructive pulmonary disease (496) (J44 9)   9  Chronic serous otitis media (381 10) (H65 20)   10  Diarrhea (787 91) (R19 7)   11  Diastolic dysfunction (765 1) (I51 9)   12  Disc degeneration, lumbar (722 52) (M51 36)   13  Elevated blood sugar (790 29) (R73 9)   14  Encounter for screening for malignant neoplasm of colon (V76 51) (Z12 11)   15  Eustachian tube dysfunction (381 81) (H69 80)   16  Exposure to varicella (V01 71) (Z20 820)   17  Heart burn (787 1) (R12)   18  Hypercholesterolemia (272 0) (E78 0)   19  Hypertension (401 9) (I10)   20  Impacted cerumen of left ear (380 4) (H61 22)   21  Morbid or severe obesity due to excess calories (278 01) (E66 01)   22  Nonalcoholic fatty liver disease (571 8) (K76 0)   23  Obstructive sleep apnea (327 23) (G47 33)   24  Occult blood positive stool (792 1) (R19 5)   25  Old myocardial infarction (412) (I25 2)   32  Persistent disorder of initiating or maintaining sleep (307 42) (G47 00)   27  Pulmonary hypertension (416 8) (I27 2)   28  Pulmonary nodule seen on imaging study (793 11) (R91 1)   29  Rectal bleeding (569 3) (K62 5)   30  Scalp lesion (709 9) (L98 9)   31  Screening for diabetes mellitus (DM) (V77 1) (Z13 1)   32  Seborrheic dermatitis of scalp (690 18) (L21 9)   33  Shortness of breath (786 05) (R06 02)   34  Sprain or strain of hamstring muscle (843 8)   35  Tobacco use (305 1) (Z72 0)   36  Traumatic compression fracture of T4 thoracic vertebra (805 2) (S22 040A)   37  Visit for pre-operative examination (V72 84) (Z01 818)   38  Visual impairment in both eyes (369 3) (H54 3)   39  Vitamin D deficiency (268 9) (E55 9)   40  Wound Bed Appearance Granulated    Current Meds   1  Albuterol Sulfate (2 5 MG/3ML) 0 083% Inhalation Nebulization Solution; USE 1 UNIT   DOSE VIA NEBULIZER  4 TIMES A DAY AS NEEDED; Therapy: 24Plv3964 to (Evaluate:15Jan2016)  Requested for: 35Scz7460; Last   Rx:53Buo0719 Ordered   2  Aspir-81 81 MG Oral Tablet Delayed Release; TAKE 1 TABLET DAILY; Therapy: 93MUC4662 to (Evaluate:10Jun2016)  Requested for: 20NYJ1925; Last   Rx:16Jun2015 Ordered   3  Atorvastatin Calcium 80 MG Oral Tablet; Therapy: 18XJK4658 to Recorded   4  Back Support L/XL Miscellaneous; USE AS DIRECTED; Therapy: 08PMW4783 to (Last Rx:58Qig0381)  Requested for: 36Dwz2905 Ordered   5  Ergocalciferol 60129 UNIT Oral Capsule; TAKE 1 CAPSULE WEEKLY; Therapy: 61EHY5141 to (Chrissie Buckner)  Requested for: 97MXY2329; Last   Rx:23Uhn5498 Ordered   6  Fluticasone Propionate 50 MCG/ACT Nasal Suspension; USE 2 SPRAYS IN EACH   NOSTRIL ONCE DAILY; Therapy: 16MBD3177 to (78 [phone])  Requested for: 74LUP8813; Last   Rx:50Mvg8277 Ordered   7  Furosemide 20 MG Oral Tablet; Take 1 tablet in the morning and 1/2 tablet in the   evening; Therapy: 85VZS7965 to (Evaluate:12Sep2016)  Requested for: 26OCA2459; Last   Rx:16Mar2016 Ordered   8  Klor-Con 10 10 MEQ Oral Tablet Extended Release; TAKE 1 TABLET BY MOUTH EVERY   DAY; Therapy: 81DMB9447 to (Evaluate:51Rrz1560)  Requested for: 87QRD2126; Last   Rx:16Mar2016 Ordered   9  Lisinopril 40 MG Oral Tablet; TAKE 1 TABLET DAILY; Therapy: 43WGC0523 to (Evaluate:12Sep2016)  Requested for: 46RWX4595; Last   Rx:16Mar2016 Ordered   10  Methocarbamol 750 MG Oral Tablet; TAKE  (1)  TABLET  THREE TIMES DAILY AS    NEEDED;     Therapy: 57JCL9445 to (Ponce Carreno) Requested for: 34SIZ5952; Last    Rx:07Mar2016 Ordered   11  Metoprolol Tartrate 25 MG Oral Tablet; take 1 tablet by mouth twice a day; Therapy: 29UAD3611 to (Evaluate:12Mar2016)  Requested for: 83Ubc1754; Last    Rx:45Imw6808 Ordered   12  Montelukast Sodium 10 MG Oral Tablet; Take 1 tablet daily for asthma; Therapy: 86ILX3541 to (Evaluate:38Tol1495)  Requested for: 67FEQ3385; Last    Rx:09Oct2014 Ordered   13  Oxycodone-Acetaminophen  MG Oral Tablet; TAKE 1 TABLET TWICE DAILY; Therapy: 01EFM2884 to (Evaluate:20Apr2016); Last Rx:23Mar2016 Ordered   14  Pantoprazole Sodium 40 MG Oral Tablet Delayed Release; TAKE 1 TABLET DAILY; Therapy: 05ENQ3439 to (Evaluate:18Mar2016)  Requested for: 97JMB9519; Last    Rx:18Jan2016 Ordered   15  Spiriva HandiHaler 18 MCG Inhalation Capsule; INHALE CONTENTS OF 1 CAPSULE    ONCE DAILY; Therapy: 19OVG2358 to (Evaluate:09Apr2015)  Requested for: 88AUF1750; Last    Rx:10Mar2015 Ordered   16  Symbicort 160-4 5 MCG/ACT Inhalation Aerosol; INHALE 2 PUFFS TWICE DAILY  RINSE MOUTH AFTER USE; Therapy: 22BWL7821 to (Evaluate:23Bfc4565)  Requested for: 05WVL1693; Last    Rx:18Jan2016 Ordered   17  Ventolin  (90 Base) MCG/ACT Inhalation Aerosol Solution; INHALE 2 PUFFS    EVERY 4 HOURS AS NEEDED; Therapy: 09IPQ6136 to (Evaluate:70Mbo1774)  Requested for: 41OPP8684; Last    Rx:18Jan2016 Ordered   18  Zolpidem Tartrate 5 MG Oral Tablet; TAKE 1 TABLET AT BEDTIME AS NEEDED; Therapy: 68XCE0927 to (Evaluate:24Mar2016); Last Rx:64Gcd4834 Ordered    Allergies    1   Penicillins    Signatures   Electronically signed by : Onel Ross, ; Mar 28 8937  1:44PM EST                       (Author)

## 2018-01-18 NOTE — RESULT NOTES
Discussion/Summary   biopsy showed hyperplastic polyp     Verified Results  (1) TISSUE EXAM [id number] 12:34PM Marquis Avina     Test Name Result Flag Reference   LAB AP CASE REPORT (Report)     Surgical Pathology Report             Case: [id number]                   Authorizing Provider: Rui Langford MD       Collected:      10/06/2017 1234        Ordering Location:   [facility] Received:      10/06/2017 1402                    Operating Room                                 Pathologist:      Guerrero Styles MD                                 Specimen:  Large Intestine, Sigmoid Colon, sigmoid colon polyp, retrieved with cold biopsy            forcep   LAB AP FINAL DIAGNOSIS      A  Sigmoid colon polyp (cold biopsy forceps):    - Portions of polypoid colonic mucosa with minimal surface hyperplasia  - No high-grade dysplasia or malignancy identified  Electronically signed by Guerrero Styles MD on 10/12/2017 at 1:35 PM   LAB AP NOTE      Interpretation performed at [facility], [address]  LAB AP SURGICAL ADDITIONAL INFORMATION (Report)     All controls performed with the immunohistochemical stains reported above   reacted appropriately  These tests were developed and their performance   characteristics determined by [facility] or   [employer]  They may not be cleared or approved by the [place]  Food and Drug Administration  The FDA has determined that such clearance   or approval is not necessary  These tests are used for clinical purposes  They should not be regarded as investigational or for research  This   laboratory has been approved by CLIA 88, designated as a high-complexity   laboratory and is qualified to perform these tests  LAB AP GROSS DESCRIPTION (Report)     A  The specimen is received in formalin, labeled with the patient's name   and hospital number, and is designated Sigmoid colon polyp   The   specimen consists of 3 tan soft tissue fragments each measuring 0 2-0 3   cm  Entirely submitted  One cassette  Note: The estimated total formalin fixation time based upon information   provided by the submitting clinician and the standard processing schedule   is less than 72 hours      MAC   LAB AP CLINICAL INFORMATION      Hemorrhage of anus and rectum

## 2018-01-18 NOTE — MISCELLANEOUS
Provider Comments  Provider Comments: You missed your appointment with Dr Ellen Erazo on 12/13/16  Please contact our office to reschedule your appointment at [phone]      Thank You,  [facility]      Signatures   Electronically signed by : Omari Jorge MD; Dec 14 2016  9:04AM EST                       (Author)

## 2018-01-22 VITALS
OXYGEN SATURATION: 98 % | SYSTOLIC BLOOD PRESSURE: 124 MMHG | TEMPERATURE: 98.6 F | HEART RATE: 75 BPM | WEIGHT: 242 LBS | HEIGHT: 60 IN | DIASTOLIC BLOOD PRESSURE: 80 MMHG | BODY MASS INDEX: 47.51 KG/M2

## 2018-01-23 VITALS
TEMPERATURE: 98.8 F | SYSTOLIC BLOOD PRESSURE: 130 MMHG | WEIGHT: 240 LBS | HEART RATE: 70 BPM | DIASTOLIC BLOOD PRESSURE: 76 MMHG | HEIGHT: 60 IN | BODY MASS INDEX: 47.12 KG/M2 | OXYGEN SATURATION: 97 %

## 2018-01-23 NOTE — MISCELLANEOUS
Message  Return to work or school:        I have been following the pt for several years  I have no cardiac restrictions on him at this time  He is doing well without any symptoms          Signatures   Electronically signed by : Carlyle Hidalgo DO; Dec  8 2017  4:40PM EST                       (Author)

## 2018-01-24 VITALS
RESPIRATION RATE: 16 BRPM | HEART RATE: 64 BPM | BODY MASS INDEX: 47.32 KG/M2 | WEIGHT: 241 LBS | OXYGEN SATURATION: 98 % | DIASTOLIC BLOOD PRESSURE: 84 MMHG | HEIGHT: 60 IN | TEMPERATURE: 96.6 F | SYSTOLIC BLOOD PRESSURE: 126 MMHG

## 2018-01-24 VITALS
WEIGHT: 242 LBS | DIASTOLIC BLOOD PRESSURE: 81 MMHG | SYSTOLIC BLOOD PRESSURE: 129 MMHG | TEMPERATURE: 97.6 F | OXYGEN SATURATION: 98 % | BODY MASS INDEX: 44.26 KG/M2 | HEART RATE: 58 BPM

## 2018-02-27 DIAGNOSIS — J44.9 CHRONIC OBSTRUCTIVE PULMONARY DISEASE, UNSPECIFIED COPD TYPE (HCC): Primary | ICD-10-CM

## 2018-03-09 RX ORDER — POTASSIUM CHLORIDE 750 MG/1
1 TABLET, EXTENDED RELEASE ORAL DAILY
Status: ON HOLD | COMMUNITY
Start: 2016-10-14 | End: 2018-05-22

## 2018-03-09 RX ORDER — METOPROLOL TARTRATE 50 MG/1
1 TABLET, FILM COATED ORAL EVERY 12 HOURS
Status: ON HOLD | COMMUNITY
Start: 2017-08-15 | End: 2018-05-22

## 2018-03-09 RX ORDER — ALBUTEROL SULFATE 90 UG/1
2 AEROSOL, METERED RESPIRATORY (INHALATION) EVERY 4 HOURS PRN
COMMUNITY
Start: 2013-12-10 | End: 2019-06-26 | Stop reason: SDUPTHER

## 2018-03-09 RX ORDER — METHOCARBAMOL 500 MG/1
TABLET, FILM COATED ORAL
COMMUNITY
Start: 2018-01-03 | End: 2018-03-12 | Stop reason: SDUPTHER

## 2018-03-09 RX ORDER — OXYCODONE HYDROCHLORIDE AND ACETAMINOPHEN 5; 325 MG/1; MG/1
TABLET ORAL
Status: ON HOLD | COMMUNITY
Start: 2015-04-06 | End: 2018-05-21 | Stop reason: ALTCHOICE

## 2018-03-09 RX ORDER — ALBUTEROL SULFATE 2.5 MG/3ML
1 SOLUTION RESPIRATORY (INHALATION) 4 TIMES DAILY PRN
COMMUNITY
Start: 2014-02-21 | End: 2018-10-01 | Stop reason: SDUPTHER

## 2018-03-09 RX ORDER — DICLOFENAC POTASSIUM 50 MG/1
50 TABLET, FILM COATED ORAL 2 TIMES DAILY WITH MEALS
Refills: 0 | COMMUNITY
Start: 2018-01-03 | End: 2018-03-12 | Stop reason: SDUPTHER

## 2018-03-09 RX ORDER — GABAPENTIN 400 MG/1
1 CAPSULE ORAL 3 TIMES DAILY
COMMUNITY
Start: 2017-01-06 | End: 2018-05-22 | Stop reason: HOSPADM

## 2018-03-09 RX ORDER — ZOLPIDEM TARTRATE 5 MG/1
5 TABLET ORAL
Refills: 0 | COMMUNITY
Start: 2017-12-11 | End: 2018-03-12 | Stop reason: SDUPTHER

## 2018-03-09 RX ORDER — LISINOPRIL 40 MG/1
1 TABLET ORAL DAILY
Status: ON HOLD | COMMUNITY
Start: 2013-12-10 | End: 2018-05-22

## 2018-03-09 RX ORDER — PRAVASTATIN SODIUM 80 MG/1
1 TABLET ORAL DAILY
Status: ON HOLD | COMMUNITY
Start: 2016-06-01 | End: 2018-05-22

## 2018-03-09 RX ORDER — ERGOCALCIFEROL 1.25 MG/1
CAPSULE ORAL
COMMUNITY
Start: 2014-03-17 | End: 2018-05-22 | Stop reason: HOSPADM

## 2018-03-09 RX ORDER — FUROSEMIDE 20 MG/1
1 TABLET ORAL 2 TIMES DAILY
Status: ON HOLD | COMMUNITY
End: 2018-05-21 | Stop reason: SDUPTHER

## 2018-03-12 ENCOUNTER — OFFICE VISIT (OUTPATIENT)
Dept: FAMILY MEDICINE CLINIC | Facility: CLINIC | Age: 58
End: 2018-03-12
Payer: COMMERCIAL

## 2018-03-12 VITALS
TEMPERATURE: 93.6 F | OXYGEN SATURATION: 94 % | WEIGHT: 231 LBS | BODY MASS INDEX: 42.51 KG/M2 | HEART RATE: 79 BPM | HEIGHT: 62 IN | RESPIRATION RATE: 18 BRPM | SYSTOLIC BLOOD PRESSURE: 124 MMHG | DIASTOLIC BLOOD PRESSURE: 64 MMHG

## 2018-03-12 DIAGNOSIS — M51.9 LUMBAR DISC DISEASE: Primary | ICD-10-CM

## 2018-03-12 DIAGNOSIS — F51.03 PARADOXICAL INSOMNIA: Primary | ICD-10-CM

## 2018-03-12 DIAGNOSIS — G47.33 OSA (OBSTRUCTIVE SLEEP APNEA): ICD-10-CM

## 2018-03-12 DIAGNOSIS — Z72.0 TOBACCO USE: ICD-10-CM

## 2018-03-12 PROBLEM — M46.1 SACROILIITIS (HCC): Status: ACTIVE | Noted: 2017-01-06

## 2018-03-12 PROBLEM — M54.16 LUMBAR RADICULOPATHY: Status: ACTIVE | Noted: 2017-01-06

## 2018-03-12 PROBLEM — M81.0 OSTEOPOROSIS WITHOUT CURRENT PATHOLOGICAL FRACTURE: Status: ACTIVE | Noted: 2017-03-29

## 2018-03-12 PROBLEM — K63.5 HYPERPLASTIC POLYP OF SIGMOID COLON: Status: ACTIVE | Noted: 2018-01-10

## 2018-03-12 PROCEDURE — 99214 OFFICE O/P EST MOD 30 MIN: CPT | Performed by: FAMILY MEDICINE

## 2018-03-12 RX ORDER — NICOTINE 21 MG/24HR
1 PATCH, TRANSDERMAL 24 HOURS TRANSDERMAL EVERY 24 HOURS
Qty: 28 PATCH | Refills: 0 | Status: ON HOLD | OUTPATIENT
Start: 2018-03-12 | End: 2018-05-21 | Stop reason: ALTCHOICE

## 2018-03-12 RX ORDER — ZOLPIDEM TARTRATE 5 MG/1
5 TABLET ORAL
Qty: 30 TABLET | Refills: 0 | Status: SHIPPED | OUTPATIENT
Start: 2018-03-12 | End: 2018-07-12 | Stop reason: SDUPTHER

## 2018-03-12 NOTE — PROGRESS NOTES
History and Physical  Quitman Essex 62 y o  male MRN: [id number]      Assessment:   HTN  Tobacco use  ALEXYS    Plan:  Continue current meds  CPAP supplies ordered  Nicotine patch as ordered  RTC 4 months  Chief Complaint   Patient presents with    Follow-up        HPI:  Quitman Essex is a 62 y o  male who presents for routine follow up  He is doing well  No complaints today  He wishes to stop smoking  He is interested in the patch      Historical Information   Past Medical History:   Diagnosis Date    CAD (coronary artery disease)     Chronic back pain     Chronic diastolic CHF (congestive heart failure) (HCC)     COPD (chronic obstructive pulmonary disease) (HCC)     HLD (hyperlipidemia)     Hypertension     ALEXYS (obstructive sleep apnea)     Shortness of breath     Vitamin D deficiency      Past Surgical History:   Procedure Laterality Date    DENTAL SURGERY      MN COLONOSCOPY FLX DX W/COLLJ SPEC WHEN PFRMD N/A 10/6/2017    Procedure: COLONOSCOPY;  Surgeon: New Li MD;  Location: [facility];  Service: Gastroenterology    TONSILLECTOMY       Social History   History   Alcohol Use    Yes     Comment: social     History   Drug Use No     History   Smoking Status    Current Every Day Smoker    Packs/day: 1 00    Years: 20 00    Types: Cigarettes   Smokeless Tobacco    Never Used     Family History   Problem Relation Age of Onset    Diabetes Mother        Meds/Allergies   Allergies   Allergen Reactions    Penicillins Anaphylaxis       Meds:    Current Outpatient Prescriptions:     albuterol (2 5 mg/3 mL) 0 083 % nebulizer solution, Inhale 1 each 4 (four) times a day as needed, Disp: , Rfl:     albuterol (VENTOLIN HFA) 90 mcg/act inhaler, Inhale 2 puffs every 4 (four) hours as needed, Disp: , Rfl:     aspirin 81 MG tablet, Take 1 tablet by mouth daily, Disp: , Rfl:     ergocalciferol (VITAMIN D2) 50,000 units, Take by mouth, Disp: , Rfl:     gabapentin (NEURONTIN) 400 mg capsule, Take 1 capsule by mouth 3 (three) times a day, Disp: , Rfl:     lisinopril (ZESTRIL) 40 mg tablet, Take 1 tablet by mouth daily, Disp: , Rfl:     methocarbamol (ROBAXIN) 500 mg tablet, Take by mouth, Disp: , Rfl:     metoprolol tartrate (LOPRESSOR) 50 mg tablet, Take 1 tablet by mouth every 12 (twelve) hours, Disp: , Rfl:     oxyCODONE-acetaminophen (PERCOCET) 5-325 mg per tablet, Take by mouth, Disp: , Rfl:     potassium chloride (K-DUR,KLOR-CON) 10 mEq tablet, Take 1 tablet by mouth daily, Disp: , Rfl:     pravastatin (PRAVACHOL) 80 mg tablet, Take 1 tablet by mouth daily, Disp: , Rfl:     tiotropium (SPIRIVA HANDIHALER) 18 mcg inhalation capsule, Place into inhaler and inhale Daily, Disp: , Rfl:     aspirin (ECOTRIN LOW STRENGTH) 81 mg EC tablet, Take 81 mg by mouth daily  , Disp: , Rfl:     budesonide-formoterol (SYMBICORT) 160-4 5 mcg/act inhaler, Inhale 2 puffs daily  , Disp: , Rfl:     diclofenac potassium (CATAFLAM) 50 mg tablet, Take 50 mg by mouth 2 (two) times a day with meals, Disp: , Rfl: 0    diclofenac sodium (VOLTAREN) 50 mg EC tablet, Take 1 tablet by mouth 2 (two) times a day as needed (back pain), Disp: 30 tablet, Rfl: 0    furosemide (LASIX) 20 mg tablet, Take 20 mg by mouth daily after breakfast , Disp: , Rfl:     furosemide (LASIX) 20 mg tablet, Take 10 mg by mouth daily at bedtime  , Disp: , Rfl:     furosemide (LASIX) 20 mg tablet, Take 1 tablet by mouth 2 (two) times a day, Disp: , Rfl:     lisinopril (ZESTRIL) 40 mg tablet, Take 40 mg by mouth daily  , Disp: , Rfl:     methocarbamol (ROBAXIN) 500 mg tablet, Take 1 tablet by mouth 2 (two) times a day, Disp: 20 tablet, Rfl: 0    metoprolol tartrate (LOPRESSOR) 25 mg tablet, Take 50 mg by mouth every 12 (twelve) hours  , Disp: , Rfl:     pravastatin (PRAVACHOL) 40 mg tablet, Take 80 mg by mouth daily at bedtime  , Disp: , Rfl:     tiotropium (SPIRIVA) 18 mcg inhalation capsule, Place 18 mcg into inhaler and inhale daily, Disp: , Rfl:     zolpidem (AMBIEN CR) 12 5 MG CR tablet, Take 5 mg by mouth daily at bedtime as needed for sleep , Disp: , Rfl:     zolpidem (AMBIEN) 5 mg tablet, Take 5 mg by mouth daily at bedtime For sleep, Disp: , Rfl: 0      REVIEW OF SYSTEMS  Review of Systems   Constitutional: Negative  HENT: Negative  Eyes: Negative  Respiratory:        As per HPI  Cardiovascular: Negative  Gastrointestinal: Negative  Endocrine: Negative  Genitourinary: Negative  Musculoskeletal: Negative  Skin: Negative  Allergic/Immunologic: Negative  Neurological: Negative  Hematological: Negative  Psychiatric/Behavioral: Negative  Current Vitals:   Blood Pressure: 124/64 (03/12/18 0834)  Pulse: 79 (03/12/18 0834)  Temperature: (!) 93 6 °F (34 2 °C) (03/12/18 0834)  Respirations: 18 (03/12/18 0834)  Height: 5' 2" (157 5 cm) (03/12/18 0834)  Weight - Scale: 105 kg (231 lb) (03/12/18 0834)  SpO2: 94 % (03/12/18 0834)      PHYSICAL EXAMS:  Physical Exam   Constitutional: He is oriented to person, place, and time  He appears well-developed and well-nourished  HENT:   Head: Normocephalic and atraumatic  Right Ear: External ear normal    Left Ear: External ear normal    Mouth/Throat: Oropharynx is clear and moist    Eyes: Conjunctivae and EOM are normal  Pupils are equal, round, and reactive to light  Neck: Normal range of motion  Neck supple  No thyromegaly present  Cardiovascular: Normal rate, regular rhythm and normal heart sounds  No murmur heard  Pulmonary/Chest: Effort normal and breath sounds normal  He has no wheezes  He has no rales  Musculoskeletal: He exhibits no edema  Neurological: He is alert and oriented to person, place, and time  No cranial nerve deficit  Skin: Skin is warm and dry  Psychiatric: He has a normal mood and affect  Lab Results:          Keke Montes PA-C  3/12/2018, 8:45 AM

## 2018-04-13 ENCOUNTER — OFFICE VISIT (OUTPATIENT)
Dept: CARDIOLOGY CLINIC | Facility: HOSPITAL | Age: 58
End: 2018-04-13
Payer: COMMERCIAL

## 2018-04-13 VITALS
BODY MASS INDEX: 38.22 KG/M2 | HEIGHT: 66 IN | WEIGHT: 237.8 LBS | HEART RATE: 78 BPM | DIASTOLIC BLOOD PRESSURE: 80 MMHG | SYSTOLIC BLOOD PRESSURE: 145 MMHG

## 2018-04-13 DIAGNOSIS — I25.10 ARTERIOSCLEROTIC CORONARY ARTERY DISEASE: Primary | ICD-10-CM

## 2018-04-13 DIAGNOSIS — I10 ESSENTIAL HYPERTENSION: ICD-10-CM

## 2018-04-13 DIAGNOSIS — I50.32 CHRONIC DIASTOLIC CHF (CONGESTIVE HEART FAILURE) (HCC): ICD-10-CM

## 2018-04-13 DIAGNOSIS — E78.00 HYPERCHOLESTEROLEMIA: ICD-10-CM

## 2018-04-13 DIAGNOSIS — G47.33 OBSTRUCTIVE SLEEP APNEA: ICD-10-CM

## 2018-04-13 PROCEDURE — 99214 OFFICE O/P EST MOD 30 MIN: CPT | Performed by: INTERNAL MEDICINE

## 2018-04-13 NOTE — PROGRESS NOTES
Cardiology Follow Up    Janell Mejia  1960  [id number]  [address] [place] [address] [facility] [place] [address] [address]    1  Arteriosclerotic coronary artery disease  VAS screening   2  Chronic diastolic CHF (congestive heart failure) ([address] )     3  Essential hypertension     4  Obstructive sleep apnea     5  Hypercholesterolemia         Discussion/Summary:  Overall he is doing well from a cardiac standpoint  Blood pressure was slightly elevated today but came down on manual recheck  Continue high-intensity pravastatin he did not tolerate more potent medications  Coronary disease has been stable without active angina  From a heart failure standpoint he has been doing well and is euvolemic on exam   Recommend continuing current medications  I have ordered a vascular screening  I counseled him on the need to quit smoking  Interval History:  Routine follow-up visit  Overall patient has been doing well with good functional capacity  He denies any chest pain, shortness of breath, palpitations, lightheadedness, dizziness, or syncope  Unfortunately he continues to smoke  Coronary disease has been stable  He had an echocardiogram last fall which was reviewed      Problem List     Alcohol use    Chronic obstructive pulmonary disease (HCC)    Essential hypertension    Arteriosclerotic coronary artery disease    Overview Signed 3/12/2018  8:39 AM by Cristal Mcknight PA-C     Description: non-obstructive         Hypertension    Hypercholesterolemia    Chronic back pain    Chronic diastolic CHF (congestive heart failure) (HCC)    Obstructive sleep apnea    Asthma    Bilateral low back pain without sciatica    Diastolic dysfunction    Disc degeneration, lumbar    Hyperplastic polyp of sigmoid colon    Lumbar radiculopathy    Nonalcoholic fatty liver disease    Osteoporosis without current pathological fracture    Sacroiliitis (HCC)    Vitamin D deficiency        Past Medical History:   Diagnosis Date    CAD (coronary artery disease)     Chronic back pain     Chronic diastolic CHF (congestive heart failure) (HCC)     COPD (chronic obstructive pulmonary disease) (HCC)     HLD (hyperlipidemia)     Hypertension     ALEXYS (obstructive sleep apnea)     Shortness of breath     Vitamin D deficiency      Social History     Social History    Marital status: Single     Spouse name: N/A    Number of children: N/A    Years of education: N/A     Occupational History    Not on file  Social History Main Topics    Smoking status: Current Every Day Smoker     Packs/day: 1 00     Years: 20 00     Types: Cigarettes    Smokeless tobacco: Never Used    Alcohol use Yes      Comment: social    Drug use: No    Sexual activity: Not on file     Other Topics Concern    Not on file     Social History Narrative    No narrative on file      Family History   Problem Relation Age of Onset    Diabetes Mother      Past Surgical History:   Procedure Laterality Date    DENTAL SURGERY      OR COLONOSCOPY FLX DX W/COLLJ SPEC WHEN PFRMD N/A 10/6/2017    Procedure: COLONOSCOPY;  Surgeon: Mallika Mendoza MD;  Location: [facility];  Service: Gastroenterology    TONSILLECTOMY         Current Outpatient Prescriptions:     albuterol (2 5 mg/3 mL) 0 083 % nebulizer solution, Inhale 1 each 4 (four) times a day as needed, Disp: , Rfl:     albuterol (VENTOLIN HFA) 90 mcg/act inhaler, Inhale 2 puffs every 4 (four) hours as needed, Disp: , Rfl:     aspirin (ECOTRIN LOW STRENGTH) 81 mg EC tablet, Take 81 mg by mouth daily  , Disp: , Rfl:     budesonide-formoterol (SYMBICORT) 160-4 5 mcg/act inhaler, Inhale 2 puffs daily  , Disp: , Rfl:     lisinopril (ZESTRIL) 40 mg tablet, Take 1 tablet by mouth daily, Disp: , Rfl:     metoprolol tartrate (LOPRESSOR) 50 mg tablet, Take 1 tablet by mouth every 12 (twelve) hours, Disp: , Rfl:    pravastatin (PRAVACHOL) 40 mg tablet, Take 80 mg by mouth daily at bedtime  , Disp: , Rfl:     zolpidem (AMBIEN) 5 mg tablet, Take 1 tablet (5 mg total) by mouth daily at bedtime For sleep, Disp: 30 tablet, Rfl: 0    aspirin 81 MG tablet, Take 1 tablet by mouth daily, Disp: , Rfl:     diclofenac sodium (VOLTAREN) 50 mg EC tablet, Take 1 tablet (50 mg total) by mouth 2 (two) times a day as needed (back pain), Disp: 60 tablet, Rfl: 1    ergocalciferol (VITAMIN D2) 50,000 units, Take by mouth, Disp: , Rfl:     furosemide (LASIX) 20 mg tablet, Take 20 mg by mouth daily after breakfast , Disp: , Rfl:     furosemide (LASIX) 20 mg tablet, Take 10 mg by mouth daily at bedtime  , Disp: , Rfl:     furosemide (LASIX) 20 mg tablet, Take 1 tablet by mouth 2 (two) times a day, Disp: , Rfl:     gabapentin (NEURONTIN) 400 mg capsule, Take 1 capsule by mouth 3 (three) times a day, Disp: , Rfl:     methocarbamol (ROBAXIN) 500 mg tablet, Take 1 tablet by mouth 2 (two) times a day, Disp: 20 tablet, Rfl: 0    metoprolol tartrate (LOPRESSOR) 25 mg tablet, Take 50 mg by mouth every 12 (twelve) hours  , Disp: , Rfl:     nicotine (NICODERM CQ) 14 mg/24hr TD 24 hr patch, Place 1 patch on the skin every 24 hours, Disp: 28 patch, Rfl: 0    oxyCODONE-acetaminophen (PERCOCET) 5-325 mg per tablet, Take by mouth, Disp: , Rfl:     potassium chloride (K-DUR,KLOR-CON) 10 mEq tablet, Take 1 tablet by mouth daily, Disp: , Rfl:     pravastatin (PRAVACHOL) 80 mg tablet, Take 1 tablet by mouth daily, Disp: , Rfl:     tiotropium (SPIRIVA HANDIHALER) 18 mcg inhalation capsule, Place into inhaler and inhale Daily, Disp: , Rfl:     tiotropium (SPIRIVA) 18 mcg inhalation capsule, Place 18 mcg into inhaler and inhale daily, Disp: , Rfl:   Allergies   Allergen Reactions    Penicillins Anaphylaxis       Labs:     Chemistry        Component Value Date/Time     11/21/2017 1050     11/25/2015 1200    K 4 2 11/21/2017 1050    K 3 2 (L) 11/25/2015 1200     11/21/2017 1050     11/25/2015 1200    CO2 27 11/21/2017 1050    CO2 26 4 11/25/2015 1200    BUN 14 11/21/2017 1050    BUN 12 11/25/2015 1200    CREATININE 0 90 11/21/2017 1050    CREATININE 1 02 11/25/2015 1200        Component Value Date/Time    CALCIUM 9 2 11/21/2017 1050    CALCIUM 9 4 11/25/2015 1200    ALKPHOS 83 11/21/2017 1050    ALKPHOS 95 11/25/2015 1200    AST 15 11/21/2017 1050    AST 23 11/25/2015 1200    ALT 29 11/21/2017 1050    ALT 53 11/25/2015 1200    BILITOT 0 20 11/21/2017 1050    BILITOT 0 71 11/25/2015 1200            Lab Results   Component Value Date    CHOL 175 11/21/2017    CHOL 205 (H) 12/07/2016    CHOL 194 11/24/2015     Lab Results   Component Value Date    HDL 51 11/21/2017    HDL 51 12/07/2016    HDL 71 11/24/2015     Lab Results   Component Value Date    LDLCALC 99 11/21/2017    LDLCALC 119 (H) 12/07/2016    LDLCALC 102 (H) 11/24/2015     Lab Results   Component Value Date    TRIG 126 11/21/2017    TRIG 175 (H) 12/07/2016    TRIG 106 11/24/2015     No components found for: CHOLHDL    Imaging: No results found  ECG:        ROS    Vitals:    04/13/18 1455   BP: 145/80   Pulse: 78     Vitals:    04/13/18 1455   Weight: 108 kg (237 lb 12 8 oz)     Height: 5' 6" (167 6 cm)   Body mass index is 38 38 kg/m²      Physical Exam: BP manual 136/82  General appearance:  Appears stated age, alert, well appearing and in no distress  HEENT:  PERRLA, EOMI, no scleral icterus, no conjunctival pallor  NECK:  Supple, No elevated JVP, no thyromegaly, no carotid bruits  HEART:  Regular rate and rhythm, normal S1/S2, no S3/S4, no murmur or rub  LUNGS:  Clear to auscultation bilaterally, no wheezes rales or rhonchi  ABDOMEN:  Soft, non-tender, positive bowel sounds, no rebound or guarding, no organomegaly   EXTREMITIES:  No edema, normal range of motion  VASCULAR:  Normal pedal pulses, good pulse volume   SKIN: No lesions or rashes on exposed skin  NEURO:  CN II-XII intact, no focal deficits

## 2018-04-16 ENCOUNTER — HOSPITAL ENCOUNTER (OUTPATIENT)
Dept: ULTRASOUND IMAGING | Facility: HOSPITAL | Age: 58
Discharge: HOME | End: 2018-04-16
Attending: INTERNAL MEDICINE
Payer: COMMERCIAL

## 2018-04-16 DIAGNOSIS — I25.10 ARTERIOSCLEROTIC CORONARY ARTERY DISEASE: ICD-10-CM

## 2018-04-16 PROCEDURE — 93880 EXTRACRANIAL BILAT STUDY: CPT | Performed by: SURGERY

## 2018-04-16 PROCEDURE — 93979 VASCULAR STUDY: CPT | Performed by: SURGERY

## 2018-04-16 PROCEDURE — 93922 UPR/L XTREMITY ART 2 LEVELS: CPT | Performed by: SURGERY

## 2018-05-21 ENCOUNTER — HOSPITAL ENCOUNTER (OUTPATIENT)
Facility: HOSPITAL | Age: 58
Setting detail: OBSERVATION
Discharge: HOME | End: 2018-05-22
Attending: EMERGENCY MEDICINE | Admitting: INTERNAL MEDICINE
Payer: COMMERCIAL

## 2018-05-21 ENCOUNTER — APPOINTMENT (EMERGENCY)
Dept: RADIOLOGY | Facility: HOSPITAL | Age: 58
End: 2018-05-21
Payer: COMMERCIAL

## 2018-05-21 DIAGNOSIS — R07.9 CHEST PAIN: Primary | ICD-10-CM

## 2018-05-21 DIAGNOSIS — I10 ESSENTIAL HYPERTENSION: ICD-10-CM

## 2018-05-21 DIAGNOSIS — J45.909 ASTHMA: ICD-10-CM

## 2018-05-21 DIAGNOSIS — J44.9 CHRONIC OBSTRUCTIVE PULMONARY DISEASE, UNSPECIFIED COPD TYPE (HCC): ICD-10-CM

## 2018-05-21 DIAGNOSIS — F10.20 CONTINUOUS CHRONIC ALCOHOLISM (HCC): ICD-10-CM

## 2018-05-21 LAB
ALBUMIN SERPL BCP-MCNC: 3.3 G/DL (ref 3.5–5)
ALP SERPL-CCNC: 88 U/L (ref 46–116)
ALT SERPL W P-5'-P-CCNC: 33 U/L (ref 12–78)
ANION GAP SERPL CALCULATED.3IONS-SCNC: 15 MMOL/L (ref 4–13)
AST SERPL W P-5'-P-CCNC: 15 U/L (ref 5–45)
ATRIAL RATE: 74 BPM
ATRIAL RATE: 90 BPM
BASOPHILS # BLD AUTO: 0.07 THOUSANDS/ΜL (ref 0–0.1)
BASOPHILS NFR BLD AUTO: 1 % (ref 0–1)
BILIRUB SERPL-MCNC: 0.2 MG/DL (ref 0.2–1)
BUN SERPL-MCNC: 20 MG/DL (ref 5–25)
CALCIUM SERPL-MCNC: 8.1 MG/DL (ref 8.3–10.1)
CHLORIDE SERPL-SCNC: 107 MMOL/L (ref 100–108)
CO2 SERPL-SCNC: 22 MMOL/L (ref 21–32)
CREAT SERPL-MCNC: 0.8 MG/DL (ref 0.6–1.3)
DEPRECATED D DIMER PPP: <270 NG/ML (FEU) (ref 0–424)
EOSINOPHIL # BLD AUTO: 0.16 THOUSAND/ΜL (ref 0–0.61)
EOSINOPHIL NFR BLD AUTO: 2 % (ref 0–6)
ERYTHROCYTE [DISTWIDTH] IN BLOOD BY AUTOMATED COUNT: 16.1 % (ref 11.6–15.1)
EST. AVERAGE GLUCOSE BLD GHB EST-MCNC: 117 MG/DL
ETHANOL SERPL-MCNC: 259 MG/DL (ref 0–3)
GFR SERPL CREATININE-BSD FRML MDRD: 99 ML/MIN/1.73SQ M
GLUCOSE SERPL-MCNC: 100 MG/DL (ref 65–140)
HBA1C MFR BLD: 5.7 % (ref 4.2–6.3)
HCT VFR BLD AUTO: 45.4 % (ref 36.5–49.3)
HGB BLD-MCNC: 14.6 G/DL (ref 12–17)
LYMPHOCYTES # BLD AUTO: 2.4 THOUSANDS/ΜL (ref 0.6–4.47)
LYMPHOCYTES NFR BLD AUTO: 24 % (ref 14–44)
MCH RBC QN AUTO: 28.1 PG (ref 26.8–34.3)
MCHC RBC AUTO-ENTMCNC: 32.2 G/DL (ref 31.4–37.4)
MCV RBC AUTO: 87 FL (ref 82–98)
MONOCYTES # BLD AUTO: 0.98 THOUSAND/ΜL (ref 0.17–1.22)
MONOCYTES NFR BLD AUTO: 10 % (ref 4–12)
NEUTROPHILS # BLD AUTO: 6.54 THOUSANDS/ΜL (ref 1.85–7.62)
NEUTS SEG NFR BLD AUTO: 64 % (ref 43–75)
P AXIS: 55 DEGREES
P AXIS: 59 DEGREES
PLATELET # BLD AUTO: 217 THOUSANDS/UL (ref 149–390)
PMV BLD AUTO: 10.6 FL (ref 8.9–12.7)
POTASSIUM SERPL-SCNC: 3.4 MMOL/L (ref 3.5–5.3)
PR INTERVAL: 128 MS
PR INTERVAL: 134 MS
PROT SERPL-MCNC: 7.2 G/DL (ref 6.4–8.2)
QRS AXIS: 86 DEGREES
QRS AXIS: 89 DEGREES
QRSD INTERVAL: 96 MS
QRSD INTERVAL: 96 MS
QT INTERVAL: 392 MS
QT INTERVAL: 396 MS
QTC INTERVAL: 439 MS
QTC INTERVAL: 479 MS
RBC # BLD AUTO: 5.2 MILLION/UL (ref 3.88–5.62)
SODIUM SERPL-SCNC: 144 MMOL/L (ref 136–145)
T WAVE AXIS: 157 DEGREES
T WAVE AXIS: 87 DEGREES
TROPONIN I SERPL-MCNC: <0.02 NG/ML
VENTRICULAR RATE: 74 BPM
VENTRICULAR RATE: 90 BPM
WBC # BLD AUTO: 10.15 THOUSAND/UL (ref 4.31–10.16)

## 2018-05-21 PROCEDURE — 94664 DEMO&/EVAL PT USE INHALER: CPT

## 2018-05-21 PROCEDURE — 93010 ELECTROCARDIOGRAM REPORT: CPT | Performed by: INTERNAL MEDICINE

## 2018-05-21 PROCEDURE — 93005 ELECTROCARDIOGRAM TRACING: CPT

## 2018-05-21 PROCEDURE — 83036 HEMOGLOBIN GLYCOSYLATED A1C: CPT | Performed by: INTERNAL MEDICINE

## 2018-05-21 PROCEDURE — 85379 FIBRIN DEGRADATION QUANT: CPT | Performed by: EMERGENCY MEDICINE

## 2018-05-21 PROCEDURE — 85025 COMPLETE CBC W/AUTO DIFF WBC: CPT | Performed by: EMERGENCY MEDICINE

## 2018-05-21 PROCEDURE — 99285 EMERGENCY DEPT VISIT HI MDM: CPT

## 2018-05-21 PROCEDURE — 94760 N-INVAS EAR/PLS OXIMETRY 1: CPT

## 2018-05-21 PROCEDURE — 84484 ASSAY OF TROPONIN QUANT: CPT | Performed by: EMERGENCY MEDICINE

## 2018-05-21 PROCEDURE — 80053 COMPREHEN METABOLIC PANEL: CPT | Performed by: EMERGENCY MEDICINE

## 2018-05-21 PROCEDURE — 94640 AIRWAY INHALATION TREATMENT: CPT

## 2018-05-21 PROCEDURE — 94660 CPAP INITIATION&MGMT: CPT

## 2018-05-21 PROCEDURE — 80320 DRUG SCREEN QUANTALCOHOLS: CPT | Performed by: EMERGENCY MEDICINE

## 2018-05-21 PROCEDURE — 71046 X-RAY EXAM CHEST 2 VIEWS: CPT

## 2018-05-21 PROCEDURE — 84484 ASSAY OF TROPONIN QUANT: CPT | Performed by: INTERNAL MEDICINE

## 2018-05-21 PROCEDURE — 99219 PR INITIAL OBSERVATION CARE/DAY 50 MINUTES: CPT | Performed by: INTERNAL MEDICINE

## 2018-05-21 PROCEDURE — 36415 COLL VENOUS BLD VENIPUNCTURE: CPT

## 2018-05-21 RX ORDER — NITROGLYCERIN 0.4 MG/1
0.4 TABLET SUBLINGUAL ONCE
Status: COMPLETED | OUTPATIENT
Start: 2018-05-21 | End: 2018-05-21

## 2018-05-21 RX ORDER — LISINOPRIL 20 MG/1
40 TABLET ORAL DAILY
Status: DISCONTINUED | OUTPATIENT
Start: 2018-05-21 | End: 2018-05-22 | Stop reason: HOSPADM

## 2018-05-21 RX ORDER — ALBUTEROL SULFATE 2.5 MG/3ML
2.5 SOLUTION RESPIRATORY (INHALATION) EVERY 6 HOURS PRN
Status: DISCONTINUED | OUTPATIENT
Start: 2018-05-21 | End: 2018-05-22 | Stop reason: HOSPADM

## 2018-05-21 RX ORDER — ASPIRIN 81 MG/1
81 TABLET ORAL DAILY
Status: DISCONTINUED | OUTPATIENT
Start: 2018-05-22 | End: 2018-05-22 | Stop reason: HOSPADM

## 2018-05-21 RX ORDER — NICOTINE 21 MG/24HR
1 PATCH, TRANSDERMAL 24 HOURS TRANSDERMAL DAILY
Status: DISCONTINUED | OUTPATIENT
Start: 2018-05-21 | End: 2018-05-22 | Stop reason: HOSPADM

## 2018-05-21 RX ORDER — PRAVASTATIN SODIUM 40 MG
80 TABLET ORAL DAILY
Status: DISCONTINUED | OUTPATIENT
Start: 2018-05-21 | End: 2018-05-22 | Stop reason: HOSPADM

## 2018-05-21 RX ORDER — POTASSIUM CHLORIDE 750 MG/1
10 TABLET, EXTENDED RELEASE ORAL DAILY
Status: DISCONTINUED | OUTPATIENT
Start: 2018-05-21 | End: 2018-05-22 | Stop reason: HOSPADM

## 2018-05-21 RX ORDER — ASPIRIN 81 MG/1
324 TABLET, CHEWABLE ORAL ONCE
Status: COMPLETED | OUTPATIENT
Start: 2018-05-21 | End: 2018-05-21

## 2018-05-21 RX ORDER — LORAZEPAM 2 MG/ML
1 INJECTION INTRAMUSCULAR ONCE
Status: DISCONTINUED | OUTPATIENT
Start: 2018-05-21 | End: 2018-05-21

## 2018-05-21 RX ORDER — BUDESONIDE AND FORMOTEROL FUMARATE DIHYDRATE 160; 4.5 UG/1; UG/1
2 AEROSOL RESPIRATORY (INHALATION) 2 TIMES DAILY
Status: DISCONTINUED | OUTPATIENT
Start: 2018-05-21 | End: 2018-05-22 | Stop reason: HOSPADM

## 2018-05-21 RX ORDER — CALCIUM CARBONATE 200(500)MG
1000 TABLET,CHEWABLE ORAL DAILY PRN
Status: DISCONTINUED | OUTPATIENT
Start: 2018-05-21 | End: 2018-05-22 | Stop reason: HOSPADM

## 2018-05-21 RX ORDER — FUROSEMIDE 20 MG/1
10 TABLET ORAL
Status: DISCONTINUED | OUTPATIENT
Start: 2018-05-21 | End: 2018-05-22 | Stop reason: HOSPADM

## 2018-05-21 RX ORDER — FUROSEMIDE 20 MG/1
20 TABLET ORAL
Status: DISCONTINUED | OUTPATIENT
Start: 2018-05-22 | End: 2018-05-22 | Stop reason: HOSPADM

## 2018-05-21 RX ORDER — ACETAMINOPHEN 325 MG/1
650 TABLET ORAL EVERY 4 HOURS PRN
Status: DISCONTINUED | OUTPATIENT
Start: 2018-05-21 | End: 2018-05-22 | Stop reason: HOSPADM

## 2018-05-21 RX ORDER — IPRATROPIUM BROMIDE AND ALBUTEROL SULFATE 2.5; .5 MG/3ML; MG/3ML
3 SOLUTION RESPIRATORY (INHALATION)
Status: DISCONTINUED | OUTPATIENT
Start: 2018-05-21 | End: 2018-05-21

## 2018-05-21 RX ORDER — ZOLPIDEM TARTRATE 5 MG/1
5 TABLET ORAL
Status: DISCONTINUED | OUTPATIENT
Start: 2018-05-21 | End: 2018-05-22 | Stop reason: HOSPADM

## 2018-05-21 RX ORDER — POTASSIUM CHLORIDE 20 MEQ/1
20 TABLET, EXTENDED RELEASE ORAL ONCE
Status: DISCONTINUED | OUTPATIENT
Start: 2018-05-21 | End: 2018-05-21

## 2018-05-21 RX ORDER — METOPROLOL TARTRATE 50 MG/1
50 TABLET, FILM COATED ORAL 2 TIMES DAILY
Status: DISCONTINUED | OUTPATIENT
Start: 2018-05-21 | End: 2018-05-22 | Stop reason: HOSPADM

## 2018-05-21 RX ORDER — LORAZEPAM 1 MG/1
1 TABLET ORAL EVERY 8 HOURS PRN
Status: DISCONTINUED | OUTPATIENT
Start: 2018-05-21 | End: 2018-05-22 | Stop reason: HOSPADM

## 2018-05-21 RX ADMIN — POTASSIUM CHLORIDE 10 MEQ: 750 TABLET, EXTENDED RELEASE ORAL at 17:23

## 2018-05-21 RX ADMIN — LISINOPRIL 40 MG: 20 TABLET ORAL at 17:23

## 2018-05-21 RX ADMIN — METOPROLOL TARTRATE 50 MG: 50 TABLET ORAL at 17:23

## 2018-05-21 RX ADMIN — IPRATROPIUM BROMIDE AND ALBUTEROL SULFATE 3 ML: .5; 3 SOLUTION RESPIRATORY (INHALATION) at 06:22

## 2018-05-21 RX ADMIN — NITROGLYCERIN 0.4 MG: 0.4 TABLET SUBLINGUAL at 06:13

## 2018-05-21 RX ADMIN — ZOLPIDEM TARTRATE 5 MG: 5 TABLET ORAL at 21:23

## 2018-05-21 RX ADMIN — PRAVASTATIN SODIUM 80 MG: 40 TABLET ORAL at 17:23

## 2018-05-21 RX ADMIN — BUDESONIDE AND FORMOTEROL FUMARATE DIHYDRATE 2 PUFF: 160; 4.5 AEROSOL RESPIRATORY (INHALATION) at 17:28

## 2018-05-21 RX ADMIN — ASPIRIN 81 MG 324 MG: 81 TABLET ORAL at 06:07

## 2018-05-21 RX ADMIN — NICOTINE 1 PATCH: 14 PATCH, EXTENDED RELEASE TRANSDERMAL at 17:23

## 2018-05-21 RX ADMIN — FUROSEMIDE 10 MG: 20 TABLET ORAL at 21:23

## 2018-05-21 NOTE — RESPIRATORY THERAPY NOTE
RT Protocol Note  Juan Campbell 62 y o  male MRN: [id number]  Unit/Bed#: 406-01 Encounter: [id number]    Assessment    Principal Problem:    Chest pain  Active Problems:    Chronic obstructive pulmonary disease (HCC)    Essential hypertension    Hypercholesterolemia    Chronic diastolic CHF (congestive heart failure) (HCC)    Obstructive sleep apnea    Continuous chronic alcoholism (HCC)      Home Pulmonary Medications:  Symbicort- per pt  Per Chart: Albuterol PRN, Spiriva  Home Devices/Therapy: BiPAP/CPAP    Past Medical History:   Diagnosis Date    CAD (coronary artery disease)     Chronic back pain     Chronic diastolic CHF (congestive heart failure) (HCC)     [facility]     HLD (hyperlipidemia)     Hypertension     ALEXYS (obstructive sleep apnea)     Shortness of breath     Vitamin D deficiency      Social History     Social History    Marital status: Single     Spouse name: N/A    Number of children: N/A    Years of education: N/A     Social History Main Topics    Smoking status: Current Every Day Smoker     Packs/day: 1 00     Years: 20 00     Types: Cigarettes    Smokeless tobacco: Never Used    Alcohol use Yes      Comment: social    Drug use: No    Sexual activity: Not Asked     Other Topics Concern    None     Social History Narrative    None       Subjective         Objective    Physical Exam:   Assessment Type: Assess only  General Appearance: Awake, Alert  Respiratory Pattern: Shallow  Chest Assessment: Chest expansion symmetrical  Bilateral Breath Sounds: Diminished    Vitals:  Blood pressure (!) 188/86, pulse 67, temperature 98 1 °F (36 7 °C), temperature source Temporal, resp  rate 18, height 5' 6" (1 676 m), weight 109 kg (239 lb 3 2 oz), SpO2 97 %  Imaging and other studies: I have personally reviewed pertinent reports              Plan    Respiratory Plan: No distress/Pulmonary history          Q6PRN Albuterol for SOb/ Wheezing, QHS Cpap

## 2018-05-21 NOTE — DISCHARGE INSTRUCTIONS
Abuse of Alcohol   AMBULATORY CARE:   Alcohol abuse   · is unhealthy drinking behavior  You may drink too much at one time once a week, or continue to drink too much daily  You continue to drink even though it causes problems  The problems can be alcohol related legal problems or problems with work or family  · If you drink too much at one time, you are binge drinking  Binge drinking is when you have a large amount of alcohol in a short time  Your blood alcohol concentrations (LILLIAN) goes above 0 08 g/dLlevel during binge drinking  For men, this usually happens with more than 4 drinks in 2 hours  For women, it is more than 3 drinks in 2 hours  A drink is 12 ounces of beer, 4 ounces of wine, or 1½ ounces of liquor  Common signs and symptoms of alcohol abuse include:  Each person that abuses alcohol may have different symptoms  The following are common signs and symptoms of alcohol abuse:  · Loss of interest in activities, work, and school    · Decreased interest in family and friends    · Depression    · Constant thoughts about drinking    · Not able to control the amount you drink    · Restlessness, or erratic and violent behavior  Call 911 for any of the following:   · You have sudden chest pain or trouble breathing  · You have a seizure or have shaking or trembling  · You feel like you could harm yourself or others  · You were in an accident because of alcohol  Seek care immediately if:   · You have hallucinations (you see or hear things that are not real)  · You cannot stop vomiting or you vomit blood  Contact your healthcare provider if:   · You need help to stop drinking alcohol  · You have questions or concerns about your condition or care    Long-term effects of alcohol abuse:   · Blackouts    · Memory loss    · Dementia    · Liver disease    · Thiamine (vitamin B1) deficiency  Treatment for alcohol abuse  may include the following:  · Detoxification (detox) and withdrawal  is a program that helps you to safely get alcohol out of your body  Detox can also help get rid of the physical need to drink  Healthcare providers monitor the physical symptoms of withdrawal  They may give you medicines to help decrease nausea, dehydration, and seizures  Healthcare providers will also monitor your blood pressure, heart and breathing rates, and your temperature  Symptoms of anxiety, depression, and suicidal thoughts are also monitored and managed during detox  Healthcare providers may give you medicines for these symptoms and therapy sessions will be available to you  Detox is usually done at a detox center or in a hospital  Healthcare providers do not recommend that you try to detox at home or by yourself  Withdrawal symptoms may become life threatening  The center can help you find 12 step programs or an individual therapist to help with emotional support after detox  · Inpatient and outpatient treatment  focus on your personal needs to help you stop drinking  Treatment helps you understand the reasons you abuse alcohol  Counselors and therapists provide you with support and help you find ways to cope instead of drinking  You may need inpatient treatment to provide a controlled environment  You may need outpatient treatment after your inpatient treatment is complete  · Alcohol aversion therapy  takes away the desire to drink by causing a negative reaction when you drink  Healthcare providers may give you medicines that cause nausea and vomiting when you drink alcohol  They may instead give you a medicine that decreases your urge to drink alcohol  These medicines are used to help you stop drinking or reduce the amount you drink  They can also help you avoid relapse  Risks of alcohol abuse:  Alcohol abuse increases your risk for gastrointestinal cancers, brain damage and problems with your immune system  It also increases your risk for heart, kidney, and lung damage   The risk of stroke increases with alcohol abuse  If you are pregnant and drink alcohol, you and your baby are at risk for serious health problems  Avoid alcohol:  You should stop drinking entirely  Alcohol can damage your brain, heart, and liver  It also increases your risk for injury, high blood pressure, and certain types of cancer  Alcohol is dangerous when you combine it with certain medicines  Do not drive if you drink alcohol:  Make sure someone who has not been drinking can help you get home  Get support:  Most people need support to stop drinking alcohol  Mental health providers, support groups, rehabilitation centers, and your healthcare provider can provide support  For more information:   · Alcoholics Anonymous  Web Address: http://www [employer]/  · Substance Abuse and [address] , [address] [place]  Web Address: https://[employer]/  Follow up with your healthcare provider as directed:  Write down your questions so you remember to ask them during your visits  © 2017 [address] Information is for End User's use only and may not be sold, redistributed or otherwise used for commercial purposes  All illustrations and images included in CareNotes® are the copyrighted property of [employer] , [employer]  or Clement Heaton  The above information is an  only  It is not intended as medical advice for individual conditions or treatments  Talk to your doctor, nurse or pharmacist before following any medical regimen to see if it is safe and effective for you  Against Medical Advice   WHAT YOU NEED TO KNOW:   Discharge against medical advice means that you choose to leave the hospital before your healthcare provider recommends that you do  Your healthcare provider may still need to diagnose or treat your condition or your treatment may not be complete     DISCHARGE INSTRUCTIONS:   Risks:  Risks of leaving the hospital before your healthcare providers recommend include the following:  · Your condition may cause other health problems if not treated properly  · You may need to be admitted to the hospital again for the same condition  · Your condition could become life-threatening  Follow up with your healthcare provider as directed:  Write down your questions so you remember to ask them during your visits  © 2017 [address] Information is for End User's use only and may not be sold, redistributed or otherwise used for commercial purposes  All illustrations and images included in CareNotes® are the copyrighted property of A D A [employer]  or Clement Heaton  The above information is an  only  It is not intended as medical advice for individual conditions or treatments  Talk to your doctor, nurse or pharmacist before following any medical regimen to see if it is safe and effective for you

## 2018-05-21 NOTE — ED NOTES
Patient said hes breathing easier and chest pain is down to a 3/10 after nitro     West Yogesh, RN  05/21/18 1808

## 2018-05-21 NOTE — ASSESSMENT & PLAN NOTE
Need for continued medical management and continued compliance with medication discussed with patient at bedside

## 2018-05-21 NOTE — ED PROVIDER NOTES
History  Chief Complaint   Patient presents with    Chest Pain     Patient woke from sleep around 2am with chest pain and sob  Patient stated he wears a cpap and still felt sob  HPI    Prior to Admission Medications   Prescriptions Last Dose Informant Patient Reported? Taking? albuterol (2 5 mg/3 mL) 0 083 % nebulizer solution   Yes No   Sig: Inhale 1 each 4 (four) times a day as needed   albuterol (VENTOLIN HFA) 90 mcg/act inhaler   Yes No   Sig: Inhale 2 puffs every 4 (four) hours as needed   aspirin (ECOTRIN LOW STRENGTH) 81 mg EC tablet   Yes No   Sig: Take 81 mg by mouth daily  aspirin 81 MG tablet   Yes No   Sig: Take 1 tablet by mouth daily   budesonide-formoterol (SYMBICORT) 160-4 5 mcg/act inhaler   Yes Yes   Sig: Inhale 2 puffs daily     diclofenac sodium (VOLTAREN) 50 mg EC tablet   No No   Sig: Take 1 tablet (50 mg total) by mouth 2 (two) times a day as needed (back pain)   ergocalciferol (VITAMIN D2) 50,000 units   Yes No   Sig: Take by mouth   furosemide (LASIX) 20 mg tablet   Yes No   Sig: Take 20 mg by mouth daily after breakfast    furosemide (LASIX) 20 mg tablet   Yes No   Sig: Take 10 mg by mouth daily at bedtime     furosemide (LASIX) 20 mg tablet   Yes No   Sig: Take 1 tablet by mouth 2 (two) times a day   gabapentin (NEURONTIN) 400 mg capsule   Yes No   Sig: Take 1 capsule by mouth 3 (three) times a day   lisinopril (ZESTRIL) 40 mg tablet   Yes No   Sig: Take 1 tablet by mouth daily   methocarbamol (ROBAXIN) 500 mg tablet   No No   Sig: Take 1 tablet by mouth 2 (two) times a day   metoprolol tartrate (LOPRESSOR) 25 mg tablet   Yes No   Sig: Take 50 mg by mouth every 12 (twelve) hours     metoprolol tartrate (LOPRESSOR) 50 mg tablet   Yes No   Sig: Take 1 tablet by mouth every 12 (twelve) hours   nicotine (NICODERM CQ) 14 mg/24hr TD 24 hr patch   No No   Sig: Place 1 patch on the skin every 24 hours   oxyCODONE-acetaminophen (PERCOCET) 5-325 mg per tablet   Yes No   Sig: Take by mouth potassium chloride (K-DUR,KLOR-CON) 10 mEq tablet   Yes No   Sig: Take 1 tablet by mouth daily   pravastatin (PRAVACHOL) 40 mg tablet   Yes No   Sig: Take 80 mg by mouth daily at bedtime     pravastatin (PRAVACHOL) 80 mg tablet   Yes No   Sig: Take 1 tablet by mouth daily   tiotropium (SPIRIVA HANDIHALER) 18 mcg inhalation capsule   Yes No   Sig: Place into inhaler and inhale Daily   tiotropium (SPIRIVA) 18 mcg inhalation capsule   Yes No   Sig: Place 18 mcg into inhaler and inhale daily   zolpidem (AMBIEN) 5 mg tablet   No No   Sig: Take 1 tablet (5 mg total) by mouth daily at bedtime For sleep      Facility-Administered Medications: None       Past Medical History:   Diagnosis Date    CAD (coronary artery disease)     Chronic back pain     Chronic diastolic CHF (congestive heart failure) (HCC)     COPD (chronic obstructive pulmonary disease) (HCC)     HLD (hyperlipidemia)     Hypertension     ALEXYS (obstructive sleep apnea)     Shortness of breath     Vitamin D deficiency        Past Surgical History:   Procedure Laterality Date    DENTAL SURGERY      WY COLONOSCOPY FLX DX W/COLLJ SPEC WHEN PFRMD N/A 10/6/2017    Procedure: COLONOSCOPY;  Surgeon: Sondra Jordan MD;  Location: [facility];  Service: Gastroenterology    TONSILLECTOMY         Family History   Problem Relation Age of Onset    Diabetes Mother      I have reviewed and agree with the history as documented      Social History   Substance Use Topics    Smoking status: Current Every Day Smoker     Packs/day: 1 00     Years: 20 00     Types: Cigarettes    Smokeless tobacco: Never Used    Alcohol use Yes      Comment: social        Review of Systems    Physical Exam  Physical Exam    Vital Signs  ED Triage Vitals   Temperature Pulse Respirations Blood Pressure SpO2   05/21/18 0511 05/21/18 0506 05/21/18 0506 05/21/18 0506 05/21/18 0506   98 2 °F (36 8 °C) 81 (!) 24 134/81 95 %      Temp Source Heart Rate Source Patient Position - Orthostatic VS BP Location FiO2 (%)   05/21/18 0511 05/21/18 0506 05/21/18 0506 05/21/18 0506 --   Temporal Monitor Sitting Right arm       Pain Score       05/21/18 0506       7           Vitals:    05/21/18 0815 05/21/18 0830 05/21/18 0845 05/21/18 1012   BP: 153/56 114/56 127/64    Pulse: 88 79 88 87   Patient Position - Orthostatic VS:  Lying Lying        Visual Acuity      ED Medications  Medications   ipratropium-albuterol (DUO-NEB) 0 5-2 5 mg/3 mL inhalation solution 3 mL (3 mL Nebulization Given 5/21/18 0622)   LORazepam (ATIVAN) 2 mg/mL injection 1 mg (not administered)   potassium chloride (K-DUR,KLOR-CON) CR tablet 20 mEq (not administered)   aspirin chewable tablet 324 mg (324 mg Oral Given 5/21/18 3553)   nitroglycerin (NITROSTAT) SL tablet 0 4 mg (0 4 mg Sublingual Given 5/21/18 3315)       Diagnostic Studies  Results Reviewed     Procedure Component Value Units Date/Time    Troponin I [[id number]]  (Normal) Collected:  05/21/18 0935    Lab Status:  Final result Specimen:  Blood from Arm, Right Updated:  05/21/18 1012     Troponin I <0 02 ng/mL     Narrative:         [employer]iemens Chemistry analyzer 99% cutoff is > 0 04 ng/mL in network labs    o cTnI 99% cutoff is useful only when applied to patients in the clinical setting of myocardial ischemia  o cTnI 99% cutoff should be interpreted in the context of clinical history, ECG findings and possibly cardiac imaging to establish correct diagnosis  o cTnI 99% cutoff may be suggestive but clearly not indicative of a coronary event without the clinical setting of myocardial ischemia      D-dimer, quantitative [[id number]]  (Normal) Collected:  05/21/18 0522    Lab Status:  Final result Specimen:  Blood from Arm, Left Updated:  05/21/18 0655     D-Dimer, Quant <270 ng/ml (FEU)     Ethanol [[id number]]  (Abnormal) Collected:  05/21/18 0635    Lab Status:  Final result Specimen:  Blood Updated:  05/21/18 0645     Ethanol Lvl 259 (H) mg/dL     Comprehensive metabolic panel [[id number]] (Abnormal) Collected:  05/21/18 0522    Lab Status:  Final result Specimen:  Blood from Arm, Left Updated:  05/21/18 0601     Sodium 144 mmol/L      Potassium 3 4 (L) mmol/L      Chloride 107 mmol/L      CO2 22 mmol/L      Anion Gap 15 (H) mmol/L      BUN 20 mg/dL      Creatinine 0 80 mg/dL      Glucose 100 mg/dL      Calcium 8 1 (L) mg/dL      AST 15 U/L      ALT 33 U/L      Alkaline Phosphatase 88 U/L      Total Protein 7 2 g/dL      Albumin 3 3 (L) g/dL      Total Bilirubin 0 20 mg/dL      eGFR 99 ml/min/1 73sq m     Narrative:         National Kidney Disease Education Program recommendations are as follows:  GFR calculation is accurate only with a steady state creatinine  Chronic Kidney disease less than 60 ml/min/1 73 sq  meters  Kidney failure less than 15 ml/min/1 73 sq  meters  Troponin I [[id number]]  (Normal) Collected:  05/21/18 0522    Lab Status:  Final result Specimen:  Blood from Arm, Left Updated:  05/21/18 0554     Troponin I <0 02 ng/mL     Narrative:         Siemens Chemistry analyzer 99% cutoff is > 0 04 ng/mL in network labs    o cTnI 99% cutoff is useful only when applied to patients in the clinical setting of myocardial ischemia  o cTnI 99% cutoff should be interpreted in the context of clinical history, ECG findings and possibly cardiac imaging to establish correct diagnosis  o cTnI 99% cutoff may be suggestive but clearly not indicative of a coronary event without the clinical setting of myocardial ischemia      CBC and differential [[id number]]  (Abnormal) Collected:  05/21/18 0522    Lab Status:  Final result Specimen:  Blood from Arm, Left Updated:  05/21/18 0537     WBC 10 15 Thousand/uL      RBC 5 20 Million/uL      Hemoglobin 14 6 g/dL      Hematocrit 45 4 %      MCV 87 fL      MCH 28 1 pg      MCHC 32 2 g/dL      RDW 16 1 (H) %      MPV 10 6 fL      Platelets 013 Thousands/uL      Neutrophils Relative 64 %      Lymphocytes Relative 24 %      Monocytes Relative 10 %      Eosinophils Relative 2 %      Basophils Relative 1 %      Neutrophils Absolute 6 54 Thousands/µL      Lymphocytes Absolute 2 40 Thousands/µL      Monocytes Absolute 0 98 Thousand/µL      Eosinophils Absolute 0 16 Thousand/µL      Basophils Absolute 0 07 Thousands/µL                  X-ray chest 2 views   ED Interpretation by Slim Acevedo MD (05/21 5301)   Read by me; Radiologist to provide formal interpretation  No acute process      Final Result by Vasiliy Noriega MD (05/21 1010)      No acute cardiopulmonary disease  Workstation performed: [id number]                    Procedures  Procedures       Phone Contacts  ED Phone Contact    ED Course  ED Course as of May 21 1124   Mon May 21, 2018   1110 Pt wants to sign AMA  Will do breathalyzer and have pt sign  Pt is alert/oriented     Is ambulating in ER without distress    1113 Pt now agreeing to stay          HEART Risk Score      Most Recent Value   History  2 Filed at: 05/21/2018 0917   ECG  1 Filed at: 05/21/2018 0678   Age  1 Filed at: 05/21/2018 6674   Risk Factors  2 Filed at: 05/21/2018 0917   Troponin  0 Filed at: 05/21/2018 0917   Heart Score Risk Calculator   History  2 Filed at: 05/21/2018 0917   ECG  1 Filed at: 05/21/2018 7071   Age  1 Filed at: 05/21/2018 9465   Risk Factors  2 Filed at: 05/21/2018 0917   Troponin  0 Filed at: 05/21/2018 2865   HEART Score  6 Filed at: 05/21/2018 1811   HEART Score  6 Filed at: 05/21/2018 1208                            MDM  CritCare Time    Disposition  Final diagnoses:   Chest pain   Continuous chronic alcoholism ([facility] [address] )     Time reflects when diagnosis was documented in both MDM as applicable and the Disposition within this note     Time User Action Codes Description Comment    5/21/2018  9:54 AM Justine Shawl Add [R07 9] Chest pain     5/21/2018 10:03 AM Justine Shawl Add [F10 20] Continuous chronic alcoholism [facility])       ED Disposition     ED Disposition Condition Comment    AMA  Case was discussed with Dr Margi Montoya and the patient's admission status was agreed to be Admission Status: observation status to the service of Dr Margi Montoya   Follow-up Information    None         Patient's Medications   Discharge Prescriptions    No medications on file     No discharge procedures on file      ED Provider  Electronically Signed by           Otis Grady DO  05/22/18 2847

## 2018-05-21 NOTE — ASSESSMENT & PLAN NOTE
Chest pain resolved, trend cardiac enzymes, monitor on telemetry,   Resume medication that patient was noncompliant with

## 2018-05-21 NOTE — ED NOTES
Patient has not been taking ANY of his medication for at least 2 months   Patient stated "I just didn't feel like being a slave to pills anymore "     Estelita Ornelas RN  05/21/18 2214

## 2018-05-21 NOTE — Clinical Note
Case was discussed with Dr Charlene Ferguson and the patient's admission status was agreed to be Admission Status: observation status to the service of Dr Charlene Ferguson

## 2018-05-21 NOTE — H&P
H&P- Janell Mejia 1960, 62 y o  male MRN: [id number]    Unit/Bed#: 406-01 Encounter: [id number]    Primary Care Provider: Cristal Mcknight PA-C   Date and time admitted to hospital: 5/21/2018  5:08 AM        * Chest pain   Assessment & Plan    Chest pain resolved, trend cardiac enzymes, monitor on telemetry,   Resume medication that patient was noncompliant with  Essential hypertension   Assessment & Plan      Resume previous p  o  meds and continue to monitor  Chronic diastolic CHF (congestive heart failure) ([facility] [address] )   Assessment & Plan      Need for continued medical management and continued compliance with medication discussed with patient at bedside  Continuous chronic alcoholism ([facility] [address] )   Assessment & Plan     Monitor for signs of acute alcohol withdrawal, Ativan p r n  Obstructive sleep apnea   Assessment & Plan     CPAP at night        Hypercholesterolemia   Assessment & Plan      Restart statin        Chronic obstructive pulmonary disease (HCC)   Assessment & Plan     Continue medical therapy, no evidence of acute exacerbation  VTE Prophylaxis: Encourage ambulation, patient is low risk   / sequential compression device and reason for no mechanical VTE prophylaxis Not indicated   Code Status:   Full code  POLST: There is no POLST form on file for this patient (pre-hospital)    Anticipated Length of Stay:  Patient will be admitted on an Observation basis with an anticipated length of stay of   Less than 2 midnights  Justification for Hospital Stay:  Serial cardiac enzymes, telemetry monitoring, re-initiation of previous p o  Medical therapy for treatment of underlying hypertension, coronary artery disease      Chief Complaint:     Chest pain    History of Present Illness:    Janell Mejia is a 62 y o  male who presents with  Chest pain, patient woke from sleep prior at 2 a m   and experienced onset of chest pain and shortness of breath      patient reports stopping all of his prescription medications other than his COPD meds about 2 months ago  At the time my examination patient has no acute pain, overall feeling better, denies any shortness of breath, no dyspnea on exertion  Review of Systems:    Review of Systems   All other systems reviewed and are negative  Past Medical and Surgical History:     Past Medical History:   Diagnosis Date    CAD (coronary artery disease)     Chronic back pain     Chronic diastolic CHF (congestive heart failure) (HCC)     COPD (chronic obstructive pulmonary disease) (HCC)     HLD (hyperlipidemia)     Hypertension     ALEXYS (obstructive sleep apnea)     Shortness of breath     Vitamin D deficiency        Past Surgical History:   Procedure Laterality Date    DENTAL SURGERY      [place] COLONOSCOPY FLX DX W/COLLJ SPEC WHEN PFRMD N/A 10/6/2017    Procedure: COLONOSCOPY;  Surgeon: Johnathon Medina MD;  Location: [facility];  Service: Gastroenterology    TONSILLECTOMY         Meds/Allergies:    Prior to Admission medications    Medication Sig Start Date End Date Taking? Authorizing Provider   albuterol (2 5 mg/3 mL) 0 083 % nebulizer solution Inhale 1 each 4 (four) times a day as needed 2/21/14  Yes Historical Provider, MD   albuterol (VENTOLIN HFA) 90 mcg/act inhaler Inhale 2 puffs every 4 (four) hours as needed 12/10/13  Yes Historical Provider, MD   budesonide-formoterol (SYMBICORT) 160-4 5 mcg/act inhaler Inhale 2 puffs daily     Yes Historical Provider, MD   tiotropium (SPIRIVA HANDIHALER) 18 mcg inhalation capsule Place into inhaler and inhale Daily 3/10/15  Yes Historical Provider, MD   aspirin (ECOTRIN LOW STRENGTH) 81 mg EC tablet Take 81 mg by mouth daily      Historical Provider, MD   diclofenac sodium (VOLTAREN) 50 mg EC tablet Take 1 tablet (50 mg total) by mouth 2 (two) times a day as needed (back pain) 3/12/18   Lesia Peña PA-C   ergocalciferol (VITAMIN D2) 50,000 units Take by mouth 3/17/14   Historical Provider, MD   furosemide (LASIX) 20 mg tablet Take 20 mg by mouth daily after breakfast     Historical Provider, MD   furosemide (LASIX) 20 mg tablet Take 10 mg by mouth daily at bedtime  Historical Provider, MD   gabapentin (NEURONTIN) 400 mg capsule Take 1 capsule by mouth 3 (three) times a day 1/6/17   Historical Provider, MD   lisinopril (ZESTRIL) 40 mg tablet Take 1 tablet by mouth daily 12/10/13   Historical Provider, MD   methocarbamol (ROBAXIN) 500 mg tablet Take 1 tablet by mouth 2 (two) times a day 12/27/17   Manjit Sawant DO   metoprolol tartrate (LOPRESSOR) 50 mg tablet Take 1 tablet by mouth every 12 (twelve) hours 8/15/17   Historical Provider, MD   potassium chloride (K-DUR,KLOR-CON) 10 mEq tablet Take 1 tablet by mouth daily 10/14/16   Historical Provider, MD   pravastatin (PRAVACHOL) 80 mg tablet Take 1 tablet by mouth daily 6/1/16   Historical Provider, MD   zolpidem (AMBIEN) 5 mg tablet Take 1 tablet (5 mg total) by mouth daily at bedtime For sleep 3/12/18   Cristal Mcknight PA-C   aspirin 81 MG tablet Take 1 tablet by mouth daily 10/30/13 5/21/18  Historical Provider, MD   furosemide (LASIX) 20 mg tablet Take 1 tablet by mouth 2 (two) times a day  5/21/18  Historical Provider, MD   metoprolol tartrate (LOPRESSOR) 25 mg tablet Take 50 mg by mouth every 12 (twelve) hours    5/21/18  Historical Provider, MD   nicotine (NICODERM CQ) 14 mg/24hr TD 24 hr patch Place 1 patch on the skin every 24 hours 3/12/18 5/21/18  Cristal Mcknight PA-C   oxyCODONE-acetaminophen (PERCOCET) 5-325 mg per tablet Take by mouth 4/6/15 5/21/18  Historical Provider, MD   pravastatin (PRAVACHOL) 40 mg tablet Take 80 mg by mouth daily at bedtime    5/21/18  Historical Provider, MD   tiotropium (SPIRIVA) 18 mcg inhalation capsule Place 18 mcg into inhaler and inhale daily  5/21/18  Historical Provider, MD TORRES have reviewed home medications with patient personally  Allergies:    Allergies   Allergen Reactions    Penicillins Anaphylaxis       Social History:     Marital Status: Single     Substance Use History:   History   Alcohol Use    Yes     Comment: social     History   Smoking Status    Current Every Day Smoker    Packs/day: 1 00    Years: 20 00    Types: Cigarettes   Smokeless Tobacco    Never Used     History   Drug Use No       Family History:    non-contributory    Physical Exam:     Vitals:   Blood Pressure: (!) 188/86 (05/21/18 1454)  Pulse: 67 (05/21/18 1504)  Temperature: 98 1 °F (36 7 °C) (05/21/18 1203)  Temp Source: Temporal (05/21/18 1203)  Respirations: 18 (05/21/18 1504)  Height: 5' 6" (167 6 cm) (05/21/18 1203)  Weight - Scale: 109 kg (239 lb 3 2 oz) (05/21/18 1203)  SpO2: 97 % (05/21/18 1504)    Physical Exam   Constitutional: He is oriented to person, place, and time  He appears well-developed and well-nourished  No distress  Cardiovascular: Normal rate, regular rhythm and intact distal pulses  Exam reveals no friction rub  No murmur heard  Pulmonary/Chest: Effort normal    Overall good air exchange, minimal rhonchi noted diffusely   Neurological: He is alert and oriented to person, place, and time  No cranial nerve deficit  Coordination normal    Skin: Skin is warm and dry  No rash noted  He is not diaphoretic  No erythema  Psychiatric: He has a normal mood and affect  His behavior is normal  Judgment and thought content normal    Nursing note and vitals reviewed  Additional Data:     Lab Results: I have personally reviewed pertinent reports          Results from last 7 days  Lab Units 05/21/18  0522   WBC Thousand/uL 10 15   HEMOGLOBIN g/dL 14 6   HEMATOCRIT % 45 4   PLATELETS Thousands/uL 217   NEUTROS PCT % 64   LYMPHS PCT % 24   MONOS PCT % 10   EOS PCT % 2       Results from last 7 days  Lab Units 05/21/18  0522   SODIUM mmol/L 144   POTASSIUM mmol/L 3 4*   CHLORIDE mmol/L 107   CO2 mmol/L 22   BUN mg/dL 20   CREATININE mg/dL 0 80   CALCIUM mg/dL 8 1*   TOTAL PROTEIN g/dL 7 2   BILIRUBIN TOTAL mg/dL 0 20   ALK PHOS U/L 88   ALT U/L 33   AST U/L 15   GLUCOSE RANDOM mg/dL 100           Imaging: I have personally reviewed pertinent reports  X-ray Chest 2 Views    Result Date: 5/21/2018  Narrative: CHEST INDICATION:   chest pain  Shortness of breath  COMPARISON:  07 2/5/2016 EXAM PERFORMED/VIEWS:  XR CHEST PA & LATERAL  The frontal view was performed utilizing dual energy radiographic technique  Images: 4 FINDINGS: Cardiomediastinal silhouette appears unremarkable  The lungs are clear  No pneumothorax or pleural effusion  Pulmonary vessels are normal  Osseous structures appear within normal limits for patient age  Impression: No acute cardiopulmonary disease   Workstation performed: [id number]

## 2018-05-21 NOTE — ED PROVIDER NOTES
History  Chief Complaint   Patient presents with    Chest Pain     Patient woke from sleep around 2am with chest pain and sob  Patient stated he wears a cpap and still felt sob  68-year-old male was awakened at 0 200 this morning with chest pain and shortness of breath  He was using his CPAP at the time  He has a history of COPD but does not require home oxygen  He did he denies pleuritic pain he has had trouble getting a deep breath in  He also had and he also had bilateral shoulder heaviness which was nonradiating he denies any numbness or paresthesias no lightheadedness no heartburn no fever chills cough or upper respiratory complaints no lower extremity edema  Patient states over the last 6 months he has lost a significant amount of weight (70 #) because he has been watching what he eats  He has stopped all his medications over the last 2 months except for Symbicort because he no longer wants to be a slave to pills  No hx of suicidal ideation  He has had no diaphoresis  No prior history of DVT or PE  No abdominal or back pain  Prior to Admission Medications   Prescriptions Last Dose Informant Patient Reported? Taking? albuterol (2 5 mg/3 mL) 0 083 % nebulizer solution   Yes No   Sig: Inhale 1 each 4 (four) times a day as needed   albuterol (VENTOLIN HFA) 90 mcg/act inhaler   Yes No   Sig: Inhale 2 puffs every 4 (four) hours as needed   aspirin (ECOTRIN LOW STRENGTH) 81 mg EC tablet   Yes No   Sig: Take 81 mg by mouth daily     aspirin 81 MG tablet   Yes No   Sig: Take 1 tablet by mouth daily   budesonide-formoterol (SYMBICORT) 160-4 5 mcg/act inhaler   Yes Yes   Sig: Inhale 2 puffs daily     diclofenac sodium (VOLTAREN) 50 mg EC tablet   No No   Sig: Take 1 tablet (50 mg total) by mouth 2 (two) times a day as needed (back pain)   ergocalciferol (VITAMIN D2) 50,000 units   Yes No   Sig: Take by mouth   furosemide (LASIX) 20 mg tablet   Yes No   Sig: Take 20 mg by mouth daily after breakfast    furosemide (LASIX) 20 mg tablet   Yes No   Sig: Take 10 mg by mouth daily at bedtime     furosemide (LASIX) 20 mg tablet   Yes No   Sig: Take 1 tablet by mouth 2 (two) times a day   gabapentin (NEURONTIN) 400 mg capsule   Yes No   Sig: Take 1 capsule by mouth 3 (three) times a day   lisinopril (ZESTRIL) 40 mg tablet   Yes No   Sig: Take 1 tablet by mouth daily   methocarbamol (ROBAXIN) 500 mg tablet   No No   Sig: Take 1 tablet by mouth 2 (two) times a day   metoprolol tartrate (LOPRESSOR) 25 mg tablet   Yes No   Sig: Take 50 mg by mouth every 12 (twelve) hours     metoprolol tartrate (LOPRESSOR) 50 mg tablet   Yes No   Sig: Take 1 tablet by mouth every 12 (twelve) hours   nicotine (NICODERM CQ) 14 mg/24hr TD 24 hr patch   No No   Sig: Place 1 patch on the skin every 24 hours   oxyCODONE-acetaminophen (PERCOCET) 5-325 mg per tablet   Yes No   Sig: Take by mouth   potassium chloride (K-DUR,KLOR-CON) 10 mEq tablet   Yes No   Sig: Take 1 tablet by mouth daily   pravastatin (PRAVACHOL) 40 mg tablet   Yes No   Sig: Take 80 mg by mouth daily at bedtime     pravastatin (PRAVACHOL) 80 mg tablet   Yes No   Sig: Take 1 tablet by mouth daily   tiotropium (SPIRIVA HANDIHALER) 18 mcg inhalation capsule   Yes No   Sig: Place into inhaler and inhale Daily   tiotropium (SPIRIVA) 18 mcg inhalation capsule   Yes No   Sig: Place 18 mcg into inhaler and inhale daily   zolpidem (AMBIEN) 5 mg tablet   No No   Sig: Take 1 tablet (5 mg total) by mouth daily at bedtime For sleep      Facility-Administered Medications: None       Past Medical History:   Diagnosis Date    CAD (coronary artery disease)     Chronic back pain     Chronic diastolic CHF (congestive heart failure) (HCC)     COPD (chronic obstructive pulmonary disease) (HCC)     HLD (hyperlipidemia)     Hypertension     ALEXYS (obstructive sleep apnea)     Shortness of breath     Vitamin D deficiency        Past Surgical History:   Procedure Laterality Date    DENTAL SURGERY      [facility] COLONOSCOPY FLX DX W/COLLJ SPEC WHEN PFRMD N/A 10/6/2017    Procedure: COLONOSCOPY;  Surgeon: Miley Luis MD;  Location[facility];  Service: Gastroenterology    TONSILLECTOMY         Family History   Problem Relation Age of Onset    Diabetes Mother      I have reviewed and agree with the history as documented  Social History   Substance Use Topics    Smoking status: Current Every Day Smoker     Packs/day: 1 00     Years: 20 00     Types: Cigarettes    Smokeless tobacco: Never Used    Alcohol use Yes      Comment: social        Review of Systems   Constitutional: Negative for activity change, appetite change, chills, diaphoresis and fever  HENT: Negative for congestion, ear pain, rhinorrhea, sneezing and sore throat  Eyes: Negative for discharge  Respiratory: Positive for shortness of breath  Negative for cough, chest tightness and wheezing  Cardiovascular: Positive for chest pain  Negative for leg swelling  Gastrointestinal: Negative for abdominal pain, blood in stool, diarrhea, nausea and vomiting  Endocrine: Negative for polyuria  Genitourinary: Negative for difficulty urinating, dysuria, frequency and urgency  Musculoskeletal: Negative for back pain and myalgias  Skin: Negative for rash  Neurological: Negative for dizziness, weakness, light-headedness, numbness and headaches  Hematological: Negative for adenopathy  Psychiatric/Behavioral: Negative for confusion and suicidal ideas  All other systems reviewed and are negative  Physical Exam  Physical Exam   Constitutional: He is oriented to person, place, and time  He appears well-developed and well-nourished  No distress  HENT:   Head: Normocephalic and atraumatic  Right Ear: External ear normal    Left Ear: External ear normal    Nose: Nose normal    Mouth/Throat: Oropharynx is clear and moist    Eyes: Conjunctivae and EOM are normal  Pupils are equal, round, and reactive to light   Right eye exhibits no discharge  Left eye exhibits no discharge  No scleral icterus  Neck: Normal range of motion  Neck supple  Cardiovascular: Normal rate, regular rhythm, normal heart sounds and intact distal pulses  Pulmonary/Chest: No respiratory distress  He has no wheezes  diminshed BS throughout no wheezing   Abdominal: Soft  Bowel sounds are normal  He exhibits no distension and no mass  There is no tenderness  There is no rebound and no guarding  Back: no mildline or CVA tenderness   Musculoskeletal: Normal range of motion  He exhibits no edema, tenderness or deformity  Lymphadenopathy:     He has no cervical adenopathy  Neurological: He is alert and oriented to person, place, and time  No cranial nerve deficit or sensory deficit  He exhibits normal muscle tone  Coordination normal    Gait steady   Skin: Skin is warm and dry  He is not diaphoretic  Psychiatric: He has a normal mood and affect  Nursing note and vitals reviewed        Vital Signs  ED Triage Vitals   Temperature Pulse Respirations Blood Pressure SpO2   05/21/18 0511 05/21/18 0506 05/21/18 0506 05/21/18 0506 05/21/18 0506   98 2 °F (36 8 °C) 81 (!) 24 134/81 95 %      Temp Source Heart Rate Source Patient Position - Orthostatic VS BP Location FiO2 (%)   05/21/18 0511 05/21/18 0506 05/21/18 0506 05/21/18 0506 --   Temporal Monitor Sitting Right arm       Pain Score       05/21/18 0506       7           Vitals:    05/21/18 1012 05/21/18 1015 05/21/18 1030 05/21/18 1203   BP:  148/74 148/70 (!) 192/100   Pulse: 87 79 90 71   Patient Position - Orthostatic VS:    Sitting       Visual Acuity      ED Medications  Medications   ipratropium-albuterol (DUO-NEB) 0 5-2 5 mg/3 mL inhalation solution 3 mL (3 mL Nebulization Given 5/21/18 0622)   LORazepam (ATIVAN) 2 mg/mL injection 1 mg (not administered)   potassium chloride (K-DUR,KLOR-CON) CR tablet 20 mEq (not administered)   aspirin chewable tablet 324 mg (324 mg Oral Given 5/21/18 0607) nitroglycerin (NITROSTAT) SL tablet 0 4 mg (0 4 mg Sublingual Given 5/21/18 0613)       Diagnostic Studies  Results Reviewed     Procedure Component Value Units Date/Time    Troponin I [[id number]]  (Normal) Collected:  05/21/18 0935    Lab Status:  Final result Specimen:  Blood from Arm, Right Updated:  05/21/18 1012     Troponin I <0 02 ng/mL     Narrative:         [employer]iem[employer] Chemistry analyzer 99% cutoff is > 0 04 ng/mL in network labs    o cTnI 99% cutoff is useful only when applied to patients in the clinical setting of myocardial ischemia  o cTnI 99% cutoff should be interpreted in the context of clinical history, ECG findings and possibly cardiac imaging to establish correct diagnosis  o cTnI 99% cutoff may be suggestive but clearly not indicative of a coronary event without the clinical setting of myocardial ischemia      D-dimer, quantitative [[id number]]  (Normal) Collected:  05/21/18 0522    Lab Status:  Final result Specimen:  Blood from Arm, Left Updated:  05/21/18 0655     D-Dimer, Quant <270 ng/ml (FEU)     Ethanol [[id number]]  (Abnormal) Collected:  05/21/18 0635    Lab Status:  Final result Specimen:  Blood Updated:  05/21/18 0645     Ethanol Lvl 259 (H) mg/dL     Comprehensive metabolic panel [[id number]]  (Abnormal) Collected:  05/21/18 0522    Lab Status:  Final result Specimen:  Blood from Arm, Left Updated:  05/21/18 0601     Sodium 144 mmol/L      Potassium 3 4 (L) mmol/L      Chloride 107 mmol/L      CO2 22 mmol/L      Anion Gap 15 (H) mmol/L      BUN 20 mg/dL      Creatinine 0 80 mg/dL      Glucose 100 mg/dL      Calcium 8 1 (L) mg/dL      AST 15 U/L      ALT 33 U/L      Alkaline Phosphatase 88 U/L      Total Protein 7 2 g/dL      Albumin 3 3 (L) g/dL      Total Bilirubin 0 20 mg/dL      eGFR 99 ml/min/1 73sq m     Narrative:         National Kidney Disease Education Program recommendations are as follows:  GFR calculation is accurate only with a steady state creatinine  Chronic Kidney disease less than 60 ml/min/1 73 sq  meters  Kidney failure less than 15 ml/min/1 73 sq  meters  Troponin I [[id number]]  (Normal) Collected:  05/21/18 0522    Lab Status:  Final result Specimen:  Blood from Arm, Left Updated:  05/21/18 0554     Troponin I <0 02 ng/mL     Narrative:         S[employer] Chemistry analyzer 99% cutoff is > 0 04 ng/mL in network labs    o cTnI 99% cutoff is useful only when applied to patients in the clinical setting of myocardial ischemia  o cTnI 99% cutoff should be interpreted in the context of clinical history, ECG findings and possibly cardiac imaging to establish correct diagnosis  o cTnI 99% cutoff may be suggestive but clearly not indicative of a coronary event without the clinical setting of myocardial ischemia  CBC and differential [[id number]]  (Abnormal) Collected:  05/21/18 0522    Lab Status:  Final result Specimen:  Blood from Arm, Left Updated:  05/21/18 0537     WBC 10 15 Thousand/uL      RBC 5 20 Million/uL      Hemoglobin 14 6 g/dL      Hematocrit 45 4 %      MCV 87 fL      MCH 28 1 pg      MCHC 32 2 g/dL      RDW 16 1 (H) %      MPV 10 6 fL      Platelets 296 Thousands/uL      Neutrophils Relative 64 %      Lymphocytes Relative 24 %      Monocytes Relative 10 %      Eosinophils Relative 2 %      Basophils Relative 1 %      Neutrophils Absolute 6 54 Thousands/µL      Lymphocytes Absolute 2 40 Thousands/µL      Monocytes Absolute 0 98 Thousand/µL      Eosinophils Absolute 0 16 Thousand/µL      Basophils Absolute 0 07 Thousands/µL                  X-ray chest 2 views   ED Interpretation by Lito Francisco MD (05/21 3064)   Read by me; Radiologist to provide formal interpretation  No acute process      Final Result by Breezy Angel MD (05/21 1010)      No acute cardiopulmonary disease              Workstation performed: [id number]                    Procedures  ECG 12 Lead Documentation  Date/Time: 5/21/2018 5:13 AM  Performed by: Malena Saldana by: Beba Ledbetter BARBARA DING     Indications / Diagnosis:  Cp  ECG reviewed by me, the ED Provider: yes    Patient location:  ED  Previous ECG:     Previous ECG:  Compared to current    Comparison ECG info:  7/24/17 17:24    Similarity:  No change  Rate:     ECG rate:  74    ECG rate assessment: normal    Rhythm:     Rhythm: sinus rhythm    QRS:     QRS axis:  Normal  Comments:      Flattened twaves V2-v3; twi v4-v6 and laterally    ECG 12 Lead Documentation  Date/Time: 5/21/2018 9:37 AM  Performed by: Munir Gusman  Authorized by: Munir Gusman     Indications / Diagnosis:  Recheck cp  ECG reviewed by me, the ED Provider: yes    Patient location:  ED  Previous ECG:     Previous ECG:  Compared to current    Comparison ECG info:  5/21/18 0510 and 7/24/17 1724    Similarity:  Changes noted  Rate:     ECG rate:  90    ECG rate assessment: normal    Rhythm:     Rhythm: sinus rhythm    QRS:     QRS axis:  Normal  Comments:      twaves now upright in V3 -V4; TWI not as deep in lateral leads           Phone Contacts  ED Phone Contact    ED Course  ED Course as of May 21 1248   Mon May 21, 2018   9073 Breathing and chest pain improves with NTG     7751 Recommending patient be admitted for observation  Declines admission because he feels fine  Reviewed symptoms improved with nitrates  Patient referes outpt follow up with Dr Gladys Brown  Will recheck delta trop, ekg      3662 Unable to find patient for repeat EKG was outside smoking  Ambulated with steady gait  6003 Reviewed EKG changes with patient  Recommend he stay otherwise would have to sign out against medical advice  Patient agreeable to being admitted/observed       1001 Case reviewed with Dr Bj Singh recommends obs tele repeat trop pending          HEART Risk Score      Most Recent Value   History  2 Filed at: 05/21/2018 0917   ECG  1 Filed at: 05/21/2018 1413   Age  1 Filed at: 05/21/2018 0917   Risk Factors  2 Filed at: 05/21/2018 0917   Troponin  0 Filed at: 05/21/2018 0141 Heart Score Risk Calculator   History  2 Filed at: 05/21/2018 0917   ECG  1 Filed at: 05/21/2018 8223   Age  1 Filed at: 05/21/2018 8323   Risk Factors  2 Filed at: 05/21/2018 0917   Troponin  0 Filed at: 05/21/2018 3333   HEART Score  6 Filed at: 05/21/2018 8710   HEART Score  6 Filed at: 05/21/2018 5638                            MDM  Number of Diagnoses or Management Options  Diagnosis management comments: mdm: acs, copd exacerbation, PE,     CritCare Time    Disposition  Final diagnoses:   Chest pain   Continuous chronic alcoholism ([facility] [address] )     Time reflects when diagnosis was documented in both MDM as applicable and the Disposition within this note     Time User Action Codes Description Comment    5/21/2018  9:54 AM Shari Prince [R07 9] Chest pain     5/21/2018 10:03 AM Dominga Dewey [F10 20] Continuous chronic alcoholism [facility])       ED Disposition     ED Disposition Condition Comment    Admit  Case was discussed with Dr Adria Pack and the patient's admission status was agreed to be Admission Status: observation status to the service of Dr Adria Pack   Follow-up Information    None         Current Discharge Medication List      CONTINUE these medications which have NOT CHANGED    Details   budesonide-formoterol (SYMBICORT) 160-4 5 mcg/act inhaler Inhale 2 puffs daily        albuterol (2 5 mg/3 mL) 0 083 % nebulizer solution Inhale 1 each 4 (four) times a day as needed      albuterol (VENTOLIN HFA) 90 mcg/act inhaler Inhale 2 puffs every 4 (four) hours as needed      aspirin (ECOTRIN LOW STRENGTH) 81 mg EC tablet Take 81 mg by mouth daily        aspirin 81 MG tablet Take 1 tablet by mouth daily      diclofenac sodium (VOLTAREN) 50 mg EC tablet Take 1 tablet (50 mg total) by mouth 2 (two) times a day as needed (back pain)  Qty: 60 tablet, Refills: 1    Associated Diagnoses: Lumbar disc disease      ergocalciferol (VITAMIN D2) 50,000 units Take by mouth      !! furosemide (LASIX) 20 mg tablet Take 20 mg by mouth daily after breakfast       !! furosemide (LASIX) 20 mg tablet Take 10 mg by mouth daily at bedtime  !! furosemide (LASIX) 20 mg tablet Take 1 tablet by mouth 2 (two) times a day      gabapentin (NEURONTIN) 400 mg capsule Take 1 capsule by mouth 3 (three) times a day      lisinopril (ZESTRIL) 40 mg tablet Take 1 tablet by mouth daily      methocarbamol (ROBAXIN) 500 mg tablet Take 1 tablet by mouth 2 (two) times a day  Qty: 20 tablet, Refills: 0      !! metoprolol tartrate (LOPRESSOR) 25 mg tablet Take 50 mg by mouth every 12 (twelve) hours        !! metoprolol tartrate (LOPRESSOR) 50 mg tablet Take 1 tablet by mouth every 12 (twelve) hours      nicotine (NICODERM CQ) 14 mg/24hr TD 24 hr patch Place 1 patch on the skin every 24 hours  Qty: 28 patch, Refills: 0    Associated Diagnoses: Tobacco use      oxyCODONE-acetaminophen (PERCOCET) 5-325 mg per tablet Take by mouth      potassium chloride (K-DUR,KLOR-CON) 10 mEq tablet Take 1 tablet by mouth daily      !! pravastatin (PRAVACHOL) 40 mg tablet Take 80 mg by mouth daily at bedtime        !! pravastatin (PRAVACHOL) 80 mg tablet Take 1 tablet by mouth daily      !! tiotropium (SPIRIVA HANDIHALER) 18 mcg inhalation capsule Place into inhaler and inhale Daily      !! tiotropium (SPIRIVA) 18 mcg inhalation capsule Place 18 mcg into inhaler and inhale daily      zolpidem (AMBIEN) 5 mg tablet Take 1 tablet (5 mg total) by mouth daily at bedtime For sleep  Qty: 30 tablet, Refills: 0    Associated Diagnoses: Paradoxical insomnia       !! - Potential duplicate medications found  Please discuss with provider  No discharge procedures on file      ED Provider  Electronically Signed by           Alfredia Nageotte, MD  05/21/18 102 RADHA Huff MD  05/21/18 102 RADHA Huff MD  05/21/18 8652

## 2018-05-22 VITALS
BODY MASS INDEX: 38.44 KG/M2 | RESPIRATION RATE: 20 BRPM | OXYGEN SATURATION: 97 % | TEMPERATURE: 97.4 F | DIASTOLIC BLOOD PRESSURE: 84 MMHG | WEIGHT: 239.2 LBS | HEART RATE: 62 BPM | SYSTOLIC BLOOD PRESSURE: 189 MMHG | HEIGHT: 66 IN

## 2018-05-22 LAB
ANION GAP SERPL CALCULATED.3IONS-SCNC: 8 MMOL/L (ref 4–13)
BUN SERPL-MCNC: 15 MG/DL (ref 5–25)
CALCIUM SERPL-MCNC: 8.5 MG/DL (ref 8.3–10.1)
CHLORIDE SERPL-SCNC: 106 MMOL/L (ref 100–108)
CHOLEST SERPL-MCNC: 205 MG/DL (ref 50–200)
CO2 SERPL-SCNC: 26 MMOL/L (ref 21–32)
CREAT SERPL-MCNC: 0.87 MG/DL (ref 0.6–1.3)
ERYTHROCYTE [DISTWIDTH] IN BLOOD BY AUTOMATED COUNT: 16.1 % (ref 11.6–15.1)
GFR SERPL CREATININE-BSD FRML MDRD: 96 ML/MIN/1.73SQ M
GLUCOSE SERPL-MCNC: 96 MG/DL (ref 65–140)
HCT VFR BLD AUTO: 47.5 % (ref 36.5–49.3)
HDLC SERPL-MCNC: 65 MG/DL (ref 40–60)
HGB BLD-MCNC: 15.2 G/DL (ref 12–17)
INR PPP: 1.12 (ref 0.86–1.17)
LDLC SERPL CALC-MCNC: 114 MG/DL (ref 0–100)
MAGNESIUM SERPL-MCNC: 1.9 MG/DL (ref 1.6–2.6)
MCH RBC QN AUTO: 27.9 PG (ref 26.8–34.3)
MCHC RBC AUTO-ENTMCNC: 32 G/DL (ref 31.4–37.4)
MCV RBC AUTO: 87 FL (ref 82–98)
NONHDLC SERPL-MCNC: 140 MG/DL
PHOSPHATE SERPL-MCNC: 3.5 MG/DL (ref 2.7–4.5)
PLATELET # BLD AUTO: 204 THOUSANDS/UL (ref 149–390)
PMV BLD AUTO: 11.1 FL (ref 8.9–12.7)
POTASSIUM SERPL-SCNC: 4.2 MMOL/L (ref 3.5–5.3)
PROTHROMBIN TIME: 13.9 SECONDS (ref 11.8–14.2)
RBC # BLD AUTO: 5.44 MILLION/UL (ref 3.88–5.62)
SODIUM SERPL-SCNC: 140 MMOL/L (ref 136–145)
TRIGL SERPL-MCNC: 130 MG/DL
WBC # BLD AUTO: 11.35 THOUSAND/UL (ref 4.31–10.16)

## 2018-05-22 PROCEDURE — 84100 ASSAY OF PHOSPHORUS: CPT | Performed by: INTERNAL MEDICINE

## 2018-05-22 PROCEDURE — 83735 ASSAY OF MAGNESIUM: CPT | Performed by: INTERNAL MEDICINE

## 2018-05-22 PROCEDURE — 99217 PR OBSERVATION CARE DISCHARGE MANAGEMENT: CPT | Performed by: INTERNAL MEDICINE

## 2018-05-22 PROCEDURE — 85610 PROTHROMBIN TIME: CPT | Performed by: INTERNAL MEDICINE

## 2018-05-22 PROCEDURE — 80048 BASIC METABOLIC PNL TOTAL CA: CPT | Performed by: INTERNAL MEDICINE

## 2018-05-22 PROCEDURE — 85027 COMPLETE CBC AUTOMATED: CPT | Performed by: INTERNAL MEDICINE

## 2018-05-22 PROCEDURE — 94660 CPAP INITIATION&MGMT: CPT

## 2018-05-22 PROCEDURE — 94760 N-INVAS EAR/PLS OXIMETRY 1: CPT

## 2018-05-22 PROCEDURE — 80061 LIPID PANEL: CPT | Performed by: INTERNAL MEDICINE

## 2018-05-22 RX ORDER — PRAVASTATIN SODIUM 80 MG/1
80 TABLET ORAL DAILY
Qty: 30 TABLET | Refills: 0 | Status: SHIPPED | OUTPATIENT
Start: 2018-05-22 | End: 2018-10-01 | Stop reason: SDUPTHER

## 2018-05-22 RX ORDER — BUDESONIDE AND FORMOTEROL FUMARATE DIHYDRATE 160; 4.5 UG/1; UG/1
2 AEROSOL RESPIRATORY (INHALATION) DAILY
Qty: 1 INHALER | Refills: 0 | Status: SHIPPED | OUTPATIENT
Start: 2018-05-22 | End: 2018-10-01 | Stop reason: SDUPTHER

## 2018-05-22 RX ORDER — LISINOPRIL 40 MG/1
40 TABLET ORAL DAILY
Qty: 30 TABLET | Refills: 0 | Status: SHIPPED | OUTPATIENT
Start: 2018-05-22 | End: 2018-10-01 | Stop reason: SDUPTHER

## 2018-05-22 RX ORDER — NICOTINE 21 MG/24HR
1 PATCH, TRANSDERMAL 24 HOURS TRANSDERMAL DAILY
Qty: 28 PATCH | Refills: 0 | Status: SHIPPED | OUTPATIENT
Start: 2018-05-22 | End: 2018-07-12

## 2018-05-22 RX ORDER — ASPIRIN 81 MG/1
81 TABLET ORAL DAILY
Qty: 30 TABLET | Refills: 0 | Status: SHIPPED | OUTPATIENT
Start: 2018-05-22 | End: 2018-10-01 | Stop reason: SDUPTHER

## 2018-05-22 RX ORDER — FUROSEMIDE 20 MG/1
20 TABLET ORAL
Qty: 30 TABLET | Refills: 0 | Status: SHIPPED | OUTPATIENT
Start: 2018-05-22 | End: 2018-10-01 | Stop reason: SDUPTHER

## 2018-05-22 RX ORDER — METOPROLOL TARTRATE 50 MG/1
50 TABLET, FILM COATED ORAL EVERY 12 HOURS
Qty: 60 TABLET | Refills: 0 | Status: SHIPPED | OUTPATIENT
Start: 2018-05-22 | End: 2018-10-01 | Stop reason: SDUPTHER

## 2018-05-22 RX ORDER — POTASSIUM CHLORIDE 750 MG/1
10 TABLET, EXTENDED RELEASE ORAL DAILY
Qty: 30 TABLET | Refills: 0 | Status: SHIPPED | OUTPATIENT
Start: 2018-05-22 | End: 2018-10-01 | Stop reason: SDUPTHER

## 2018-05-22 RX ADMIN — BUDESONIDE AND FORMOTEROL FUMARATE DIHYDRATE 2 PUFF: 160; 4.5 AEROSOL RESPIRATORY (INHALATION) at 08:34

## 2018-05-22 RX ADMIN — LISINOPRIL 40 MG: 20 TABLET ORAL at 08:35

## 2018-05-22 RX ADMIN — FUROSEMIDE 20 MG: 20 TABLET ORAL at 08:35

## 2018-05-22 RX ADMIN — ASPIRIN 81 MG: 81 TABLET, COATED ORAL at 08:35

## 2018-05-22 RX ADMIN — POTASSIUM CHLORIDE 10 MEQ: 750 TABLET, EXTENDED RELEASE ORAL at 08:35

## 2018-05-22 RX ADMIN — METOPROLOL TARTRATE 50 MG: 50 TABLET ORAL at 08:35

## 2018-05-22 RX ADMIN — TIOTROPIUM BROMIDE 18 MCG: 18 CAPSULE ORAL; RESPIRATORY (INHALATION) at 08:37

## 2018-05-22 NOTE — DISCHARGE SUMMARY
Discharge Summary - [facility]    Patient Information: Vance Goldberg 62 y o  male MRN: [id number]  Unit/Bed#: 406-01 Encounter: [id number]    Discharging Physician / Practitioner: Allyson Bearden DO  PCP: Bonilla Salcido PA-C  Admission Date: 5/21/2018  Discharge Date: 05/22/18    Disposition:     Home    Reason for Admission:   Atypical chest pain    Discharge Diagnoses:     Principal Problem (Resolved):    Chest pain  Active Problems:    Essential hypertension    Chronic diastolic CHF (congestive heart failure) ([address] )    Hypercholesterolemia    Obstructive sleep apnea    Continuous chronic alcoholism ([facility] )    Chronic obstructive pulmonary disease ([facility] )      Consultations During Hospital Stay:  ·  none    Hospital Course:     Vance Goldberg is a 62 y o  male patient who originally presented to the hospital on 5/21/2018 due to  Atypical chest pain which occurred at rest     Patient had serial cardiac enzymes which were negative  Patient was monitored on telemetry without any significant arrhythmia  Head patient had previously stopped taking all of his prescription medications other than in his inhalers, cardiac medications were reviewed and restarted with the patient, he is instructed follow up with his primary care provider in 1 to 2 weeks for blood pressure check  Patient is encouraged to continue follow-up with his cardiologist Dr Candace Cardoso  Condition at Discharge: good     Discharge Day Visit / Exam:     Subjective:  No pain  Vitals: Blood Pressure: (!) 189/84 (Dr Sahara Lucia aware) (05/22/18 1100)  Pulse: 62 (05/22/18 1100)  Temperature: (!) 97 4 °F (36 3 °C) (05/22/18 0731)  Temp Source: Temporal (05/22/18 0731)  Respirations: 20 (05/22/18 0731)  Height: 5' 6" (167 6 cm) (05/21/18 1203)  Weight - Scale: 109 kg (239 lb 3 2 oz) (05/21/18 1203)  SpO2: 97 % (05/22/18 0731)  Exam:   Physical Exam   Constitutional: He is oriented to person, place, and time   He appears well-developed and well-nourished  No distress  Pulmonary/Chest: Effort normal and breath sounds normal  No respiratory distress  He has no wheezes  Abdominal: Soft  Bowel sounds are normal  He exhibits no distension  There is no tenderness  Neurological: He is alert and oriented to person, place, and time  No cranial nerve deficit  Coordination normal    Skin: Skin is warm and dry  No rash noted  He is not diaphoretic  No erythema  Psychiatric: He has a normal mood and affect  His behavior is normal  Judgment and thought content normal    Nursing note and vitals reviewed  Discharge instructions/Information to patient and family:   See after visit summary for information provided to patient and family  Provisions for Follow-Up Care:  See after visit summary for information related to follow-up care and any pertinent home health orders  Planned Readmission: no      Discharge Statement:  I spent 25 minutes discharging the patient  This time was spent on the day of discharge  I had direct contact with the patient on the day of discharge  Greater than 50% of the total time was spent examining patient, answering all patient questions, arranging and discussing plan of care with patient as well as directly providing post-discharge instructions  Additional time then spent on discharge activities  Discharge Medications:  See after visit summary for reconciled discharge medications provided to patient and family        ** Please Note: This note has been constructed using a voice recognition system **

## 2018-05-22 NOTE — NURSING NOTE
AVS printed and reviewed with patient  Patient verbalized understanding  Patient discharged with no home services

## 2018-05-22 NOTE — SOCIAL WORK
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

## 2018-05-22 NOTE — CASE MANAGEMENT
Initial Clinical Review  Admission: Date/Time/Statement:   OBSERVATION 5/21/18 @ 0959  Orders Placed This Encounter   Procedures    Place in Observation (expected length of stay for this patient is less than two midnights)     Standing Status:   Standing     Number of Occurrences:   1     Order Specific Question:   Admitting Physician     Answer:   Melody Cosme     Order Specific Question:   Level of Care     Answer:   Med Surg [16]   ED: Date/Time/Mode of Arrival:   ED Arrival Information     Expected Arrival Acuity Means of Arrival Escorted By Service Admission Type    - 5/21/2018 04:59 Emergent Walk-In Self General Medicine Emergency    Arrival Complaint    Chest Pain      Chief Complaint:   Chief Complaint   Patient presents with    Chest Pain     Patient woke from sleep around 2am with chest pain and sob  Patient stated he wears a cpap and still felt sob  History of Illness:   63-year-old male was awakened at 0 200 this morning with chest pain and shortness of breath  He was using his CPAP at the time  He has a history of COPD but does not require home oxygen  He did he denies pleuritic pain he has had trouble getting a deep breath in  He also had and he also had bilateral shoulder heaviness which was nonradiating  ED Vital Signs:   ED Triage Vitals   Temperature Pulse Respirations Blood Pressure SpO2   05/21/18 0511 05/21/18 0506 05/21/18 0506 05/21/18 0506 05/21/18 0506   98 2 °F (36 8 °C) 81 (!) 24 134/81 95 %      Temp Source Heart Rate Source Patient Position - Orthostatic VS BP Location FiO2 (%)   05/21/18 0511 05/21/18 0506 05/21/18 0506 05/21/18 0506 --   Temporal Monitor Sitting Right arm       Pain Score       05/21/18 0506       7        Wt Readings from Last 1 Encounters:   05/21/18 109 kg (239 lb 3 2 oz)   Vital Signs (abnormal):   /64  Abnormal Labs/Diagnostic Test Results:   WBC 11 35   TROP NEG  ETOH 5/21 259  CXR= No acute cardiopulmonary disease    EKG=  Ventricular Rate BPM 74    Atrial Rate BPM 74    TN Interval ms 128    QRSD Interval ms 96    QT Interval ms 396    QTC Interval ms 439    P [place] degrees 55    QRS Axis degrees 89    T Wave Axis degrees 157      Normal sinus rhythm  ST & T wave abnormality, consider inferolateral ischemia      ED Treatment:   Medication Administration from 05/21/2018 0459 to 05/21/2018 1221       Date/Time Order Dose Route Action Action by Comments     05/21/2018 7680 aspirin chewable tablet 324 mg 324 mg Oral Given Sebastian Andrew RN      05/21/2018 8927 nitroglycerin (NITROSTAT) SL tablet 0 4 mg 0 4 mg Sublingual Given Sebastian Andrew RN /57     05/21/2018 0622 ipratropium-albuterol (DUO-NEB) 0 5-2 5 mg/3 mL inhalation solution 3 mL 3 mL Nebulization Given Sebastian Andrew RN       Past Medical/Surgical History:    Active Ambulatory Problems     Diagnosis Date Noted    Chest pain 07/25/2016    Alcohol use 07/25/2016    Chronic obstructive pulmonary disease ([facility] [address] ) 07/25/2016    Essential hypertension 07/25/2016    Arteriosclerotic coronary artery disease     Hypertension     Hypercholesterolemia     Chronic back pain     Chronic diastolic CHF (congestive heart failure) ([facility] [address] )     Obstructive sleep apnea     Asthma 09/16/2014    Bilateral low back pain without sciatica 76/82/0035    Diastolic dysfunction 58/68/2683    Disc degeneration, lumbar 03/17/2014    Hyperplastic polyp of sigmoid colon 01/10/2018    Lumbar radiculopathy 03/70/4191    Nonalcoholic fatty liver disease 04/07/2015    Osteoporosis without current pathological fracture 03/29/2017    Sacroiliitis ([facility] [address] ) 01/06/2017    Vitamin D deficiency 03/17/2014     Resolved Ambulatory Problems     Diagnosis Date Noted    Dog bite 07/25/2016    Tobacco abuse 07/25/2016    Rectal bleeding 07/24/2017    Elevated lactic acid level 07/24/2017     Past Medical History:   Diagnosis Date    CAD (coronary artery disease)     Chronic back pain     Chronic diastolic CHF (congestive heart failure) (HCC)     [facility]     HLD (hyperlipidemia)     Hypertension     ALEXYS (obstructive sleep apnea)     Shortness of breath     Vitamin D deficiency    Admitting Diagnosis: Chest pain [R07 9]  Continuous chronic alcoholism ([facility]) [F10 20]  Age/Sex: 62 y o  male  Assessment/Plan:   Chest pain   Assessment & Plan     Chest pain resolved, trend cardiac enzymes, monitor on telemetry,   Resume medication that patient was noncompliant with  Essential hypertension   Assessment & Plan       Resume previous p  o  meds and continue to monitor  Chronic diastolic CHF (congestive heart failure) ([facility] [address] )   Assessment & Plan       Need for continued medical management and continued compliance with medication discussed with patient at bedside  Continuous chronic alcoholism ([facility] [address] )   Assessment & Plan      Monitor for signs of acute alcohol withdrawal, Ativan p r n  Obstructive sleep apnea   Assessment & Plan      CPAP at night   Hypercholesterolemia   Assessment & Plan       Restart statin   Chronic obstructive pulmonary disease (HCC)   Assessment & Plan      Continue medical therapy, no evidence of acute exacerbation     Admission Orders:  TELEMETRY  Scheduled Meds:   Current Facility-Administered Medications:  acetaminophen 650 mg Oral Q4H PRN Jocelin Friar, DO   albuterol 2 5 mg Nebulization Q6H PRN Jocelin Friar, DO   aspirin 81 mg Oral Daily Jocelin Friar, DO   budesonide-formoterol 2 puff Inhalation BID Jocelin Friar, DO   calcium carbonate 1,000 mg Oral Daily PRN Jocelin Friar, DO   furosemide 10 mg Oral HS Jocelin Friar, DO   furosemide 20 mg Oral After Breakfast Jocelin Friar, DO   lisinopril 40 mg Oral Daily Jocelin Friar, DO   LORazepam 1 mg Oral Q8H PRN Jocelin Friar, DO   metoprolol tartrate 50 mg Oral BID Jocelin Friar, DO   nicotine 1 patch Transdermal Daily Jocelin Friar, DO   potassium chloride 10 mEq Oral Daily Jocelin Friar, DO   pravastatin 80 mg Oral Daily Jung Juan, DO   tiotropium 18 mcg Inhalation Daily Jung Juan, DO   zolpidem 5 mg Oral HS Jung Juan, DO     Continuous Infusions:    PRN Meds:   acetaminophen    albuterol    calcium carbonate    LORazepam  Thank you,  [address] [facility] in the [facility] by Clement Heaton for 2017  Network Utilization Review Department  Phone: [phone]; Fax [phone]  ATTENTION: The Network Utilization Review Department is now centralized for our 7 Facilities  Make a note that we have a new phone and fax numbers for our Department  Please call with any questions or concerns to [phone] and carefully follow the prompts so that you are directed to the right person  All voicemails are confidential  Fax any determinations, approvals, denials, and requests for initial or continue stay review clinical to [phone]  Due to HIGH CALL volume, it would be easier if you could please send faxed requests to expedite your requests and in part, help us provide discharge notifications faster

## 2018-06-19 ENCOUNTER — PATIENT OUTREACH (OUTPATIENT)
Dept: OTHER | Facility: HOSPITAL | Age: 58
End: 2018-06-19

## 2018-06-19 NOTE — PROGRESS NOTES
Called to introduce self and discuss care management / assess services needed and services already in place  mane spoke with pt who says he is doing well  reinforced importance of taking meds as prescribed  c/p,sob or edema  pt reports went to dentist for exam and need teeth extraction   He was advised by [facility] he will have to pay for procedure up front  advise pt to call [facility] and verify procedure and provider coverage   pt states he will call as today  pt also reports needs new mask of CPAP  Jil Rivero Follow-up appt Reviewed exacerbation symptoms to watch for condition worsening and need to notify care team  Agrees to follow-up call and has CM contact info

## 2018-06-19 NOTE — Clinical Note
Outpatient Care Management Note  RE: Hi Bertis Mcardle please see my outpt care manager note   Cole Montes  needs a new mask for his CPAP  Is this something your office can order for him?  He uses [employer] for DME

## 2018-06-20 ENCOUNTER — PATIENT OUTREACH (OUTPATIENT)
Dept: OTHER | Facility: HOSPITAL | Age: 58
End: 2018-06-20

## 2018-06-20 NOTE — PROGRESS NOTES
called pt RE: message from pcp -pt needs to call Dr Michaels Setting for mask   He wants to see all pts yearly and pcp wouldn't know what mask to order anyway  no answer ,left message to contact CM

## 2018-07-12 ENCOUNTER — OFFICE VISIT (OUTPATIENT)
Dept: FAMILY MEDICINE CLINIC | Facility: CLINIC | Age: 58
End: 2018-07-12
Payer: COMMERCIAL

## 2018-07-12 VITALS
BODY MASS INDEX: 39.05 KG/M2 | WEIGHT: 243 LBS | HEART RATE: 74 BPM | TEMPERATURE: 97.6 F | OXYGEN SATURATION: 97 % | SYSTOLIC BLOOD PRESSURE: 134 MMHG | HEIGHT: 66 IN | RESPIRATION RATE: 18 BRPM | DIASTOLIC BLOOD PRESSURE: 84 MMHG

## 2018-07-12 DIAGNOSIS — F51.03 PARADOXICAL INSOMNIA: ICD-10-CM

## 2018-07-12 PROCEDURE — 99213 OFFICE O/P EST LOW 20 MIN: CPT | Performed by: FAMILY MEDICINE

## 2018-07-12 RX ORDER — ZOLPIDEM TARTRATE 5 MG/1
5 TABLET ORAL
Qty: 30 TABLET | Refills: 0 | Status: SHIPPED | OUTPATIENT
Start: 2018-07-12 | End: 2018-11-15 | Stop reason: SDUPTHER

## 2018-07-12 NOTE — PROGRESS NOTES
History and Physical  Cruz Alvarez 62 y o  male MRN: [id number]      Assessment:   HTN  COPD  Insomnia    Plan:  Continue current meds  Weight loss encouraged  Will refill Ambien 5 mg prn for insomnia  RTC 4 months  Chief Complaint   Patient presents with    Follow-up     Patient is here for ergular check up  HPI:  Cruz Alvarez is a 62 y o  male who presents for routine follow up  He is doing well  He was in hospital for chest pain and it was found he stopped all his meds except for his inhalers  B/P was extremely elevated  He has no complaints today      Historical Information   Past Medical History:   Diagnosis Date    CAD (coronary artery disease)     Chronic back pain     Chronic diastolic CHF (congestive heart failure) (HCC)     COPD (chronic obstructive pulmonary disease) (HCC)     HLD (hyperlipidemia)     Hypertension     ALEXYS (obstructive sleep apnea)     Shortness of breath     Vitamin D deficiency      Past Surgical History:   Procedure Laterality Date    DENTAL SURGERY      TX COLONOSCOPY FLX DX W/COLLJ SPEC WHEN PFRMD N/A 10/6/2017    Procedure: COLONOSCOPY;  Surgeon: Wendy Morales MD;  Location: [facility];  Service: Gastroenterology    TONSILLECTOMY       Social History   History   Alcohol Use    Yes     Comment: social     History   Drug Use No     History   Smoking Status    Current Every Day Smoker    Packs/day: 1 00    Years: 20 00    Types: Cigarettes   Smokeless Tobacco    Never Used     Family History   Problem Relation Age of Onset    Diabetes Mother        Meds/Allergies   Allergies   Allergen Reactions    Penicillins Anaphylaxis       Meds:    Current Outpatient Prescriptions:     albuterol (2 5 mg/3 mL) 0 083 % nebulizer solution, Inhale 1 each 4 (four) times a day as needed, Disp: , Rfl:     albuterol (VENTOLIN HFA) 90 mcg/act inhaler, Inhale 2 puffs every 4 (four) hours as needed, Disp: , Rfl:     aspirin (ECOTRIN LOW STRENGTH) 81 mg EC tablet, Take 1 tablet (81 mg total) by mouth daily, Disp: 30 tablet, Rfl: 0    budesonide-formoterol (SYMBICORT) 160-4 5 mcg/act inhaler, Inhale 2 puffs daily, Disp: 1 Inhaler, Rfl: 0    furosemide (LASIX) 20 mg tablet, Take 1 tablet (20 mg total) by mouth daily after breakfast, Disp: 30 tablet, Rfl: 0    lisinopril (ZESTRIL) 40 mg tablet, Take 1 tablet (40 mg total) by mouth daily, Disp: 30 tablet, Rfl: 0    metoprolol tartrate (LOPRESSOR) 50 mg tablet, Take 1 tablet (50 mg total) by mouth every 12 (twelve) hours, Disp: 60 tablet, Rfl: 0    potassium chloride (K-DUR,KLOR-CON) 10 mEq tablet, Take 1 tablet (10 mEq total) by mouth daily, Disp: 30 tablet, Rfl: 0    pravastatin (PRAVACHOL) 80 mg tablet, Take 1 tablet (80 mg total) by mouth daily, Disp: 30 tablet, Rfl: 0    tiotropium (SPIRIVA HANDIHALER) 18 mcg inhalation capsule, Place 1 capsule (18 mcg total) into inhaler and inhale daily, Disp: 30 capsule, Rfl: 0    zolpidem (AMBIEN) 5 mg tablet, Take 1 tablet (5 mg total) by mouth daily at bedtime For sleep, Disp: 30 tablet, Rfl: 0      REVIEW OF SYSTEMS  Review of Systems   Constitutional: Negative  HENT: Negative  Eyes: Negative  Respiratory: Negative  Cardiovascular:        As per HPI   Gastrointestinal: Negative  Endocrine: Negative  Genitourinary: Negative  Musculoskeletal: Negative  Skin: Negative  Allergic/Immunologic: Negative  Neurological: Negative  Psychiatric/Behavioral: Negative  Current Vitals:   Blood Pressure: 134/84 (07/12/18 0843)  Pulse: 74 (07/12/18 0843)  Temperature: 97 6 °F (36 4 °C) (07/12/18 0843)  Respirations: 18 (07/12/18 0843)  Height: 5' 6" (167 6 cm) (07/12/18 0843)  Weight - Scale: 110 kg (243 lb) (07/12/18 0843)  SpO2: 97 % (07/12/18 0843)  Body mass index is 39 22 kg/m²  PHYSICAL EXAMS:  Physical Exam   Constitutional: He is oriented to person, place, and time  He appears well-developed and well-nourished     HENT:   Head: Normocephalic and atraumatic  Right Ear: External ear normal    Left Ear: External ear normal    Eyes: EOM are normal  Pupils are equal, round, and reactive to light  Neck: Normal range of motion  Neck supple  No thyromegaly present  Cardiovascular: Normal rate, regular rhythm and normal heart sounds  Pulmonary/Chest: Effort normal and breath sounds normal  He has no wheezes  He has no rales  Musculoskeletal: He exhibits no edema  Neurological: He is alert and oriented to person, place, and time  Skin: Skin is warm and dry  Psychiatric: He has a normal mood and affect  Lab Results:          Lorena Madden PA-C  7/12/2018, 8:49 AM

## 2018-10-01 DIAGNOSIS — J44.9 CHRONIC OBSTRUCTIVE PULMONARY DISEASE, UNSPECIFIED COPD TYPE (HCC): ICD-10-CM

## 2018-10-01 DIAGNOSIS — I10 ESSENTIAL HYPERTENSION: ICD-10-CM

## 2018-10-01 DIAGNOSIS — R52 GENERALIZED PAIN: Primary | ICD-10-CM

## 2018-10-01 DIAGNOSIS — J41.0 SIMPLE CHRONIC BRONCHITIS (HCC): ICD-10-CM

## 2018-10-01 DIAGNOSIS — F51.03 PARADOXICAL INSOMNIA: ICD-10-CM

## 2018-10-01 RX ORDER — FUROSEMIDE 20 MG/1
20 TABLET ORAL
Qty: 30 TABLET | Refills: 1 | Status: SHIPPED | OUTPATIENT
Start: 2018-10-01 | End: 2018-12-05 | Stop reason: SDUPTHER

## 2018-10-01 RX ORDER — ALBUTEROL SULFATE 2.5 MG/3ML
2.5 SOLUTION RESPIRATORY (INHALATION) 4 TIMES DAILY PRN
Qty: 3 VIAL | Refills: 0 | Status: SHIPPED | OUTPATIENT
Start: 2018-10-01 | End: 2018-11-10 | Stop reason: SDUPTHER

## 2018-10-01 RX ORDER — ASPIRIN 81 MG/1
81 TABLET ORAL DAILY
Qty: 30 TABLET | Refills: 0 | Status: SHIPPED | OUTPATIENT
Start: 2018-10-01 | End: 2018-11-10 | Stop reason: SDUPTHER

## 2018-10-01 RX ORDER — PRAVASTATIN SODIUM 80 MG/1
80 TABLET ORAL DAILY
Qty: 30 TABLET | Refills: 1 | Status: SHIPPED | OUTPATIENT
Start: 2018-10-01 | End: 2018-10-03 | Stop reason: SDUPTHER

## 2018-10-01 RX ORDER — BUDESONIDE AND FORMOTEROL FUMARATE DIHYDRATE 160; 4.5 UG/1; UG/1
2 AEROSOL RESPIRATORY (INHALATION) DAILY
Qty: 1 INHALER | Refills: 1 | Status: SHIPPED | OUTPATIENT
Start: 2018-10-01 | End: 2019-05-14 | Stop reason: SDUPTHER

## 2018-10-01 RX ORDER — POTASSIUM CHLORIDE 750 MG/1
10 TABLET, EXTENDED RELEASE ORAL DAILY
Qty: 30 TABLET | Refills: 0 | Status: SHIPPED | OUTPATIENT
Start: 2018-10-01 | End: 2018-11-10 | Stop reason: SDUPTHER

## 2018-10-01 RX ORDER — METOPROLOL TARTRATE 50 MG/1
50 TABLET, FILM COATED ORAL EVERY 12 HOURS
Qty: 60 TABLET | Refills: 0 | Status: SHIPPED | OUTPATIENT
Start: 2018-10-01 | End: 2018-11-10 | Stop reason: SDUPTHER

## 2018-10-01 RX ORDER — LISINOPRIL 40 MG/1
40 TABLET ORAL DAILY
Qty: 30 TABLET | Refills: 1 | Status: SHIPPED | OUTPATIENT
Start: 2018-10-01 | End: 2018-10-03 | Stop reason: SDUPTHER

## 2018-10-03 DIAGNOSIS — I10 ESSENTIAL HYPERTENSION: ICD-10-CM

## 2018-10-03 DIAGNOSIS — E78.5 DYSLIPIDEMIA: Primary | ICD-10-CM

## 2018-10-03 RX ORDER — PRAVASTATIN SODIUM 80 MG/1
80 TABLET ORAL DAILY
Qty: 90 TABLET | Refills: 3 | Status: SHIPPED | OUTPATIENT
Start: 2018-10-03 | End: 2019-06-26 | Stop reason: SDUPTHER

## 2018-10-03 RX ORDER — LISINOPRIL 40 MG/1
40 TABLET ORAL DAILY
Qty: 90 TABLET | Refills: 3 | Status: SHIPPED | OUTPATIENT
Start: 2018-10-03 | End: 2019-06-26 | Stop reason: SDUPTHER

## 2018-11-10 DIAGNOSIS — R52 GENERALIZED PAIN: ICD-10-CM

## 2018-11-10 DIAGNOSIS — J41.0 SIMPLE CHRONIC BRONCHITIS (HCC): ICD-10-CM

## 2018-11-10 DIAGNOSIS — I10 ESSENTIAL HYPERTENSION: ICD-10-CM

## 2018-11-12 RX ORDER — POTASSIUM CHLORIDE 750 MG/1
TABLET, EXTENDED RELEASE ORAL
Qty: 30 TABLET | Refills: 0 | Status: SHIPPED | OUTPATIENT
Start: 2018-11-12 | End: 2018-11-19 | Stop reason: SDUPTHER

## 2018-11-12 RX ORDER — ACETAMINOPHEN/DIPHENHYDRAMINE 500MG-25MG
TABLET ORAL
Qty: 30 TABLET | Refills: 0 | Status: SHIPPED | OUTPATIENT
Start: 2018-11-12 | End: 2018-11-19 | Stop reason: SDUPTHER

## 2018-11-12 RX ORDER — METOPROLOL TARTRATE 50 MG/1
TABLET, FILM COATED ORAL
Qty: 60 TABLET | Refills: 0 | Status: SHIPPED | OUTPATIENT
Start: 2018-11-12 | End: 2019-06-26 | Stop reason: SDUPTHER

## 2018-11-12 RX ORDER — ALBUTEROL SULFATE 2.5 MG/3ML
SOLUTION RESPIRATORY (INHALATION)
Qty: 225 ML | Refills: 0 | Status: SHIPPED | OUTPATIENT
Start: 2018-11-12 | End: 2018-11-15

## 2018-11-15 ENCOUNTER — OFFICE VISIT (OUTPATIENT)
Dept: FAMILY MEDICINE CLINIC | Facility: CLINIC | Age: 58
End: 2018-11-15
Payer: COMMERCIAL

## 2018-11-15 VITALS
BODY MASS INDEX: 40.18 KG/M2 | RESPIRATION RATE: 18 BRPM | TEMPERATURE: 97.6 F | DIASTOLIC BLOOD PRESSURE: 80 MMHG | WEIGHT: 250 LBS | HEART RATE: 70 BPM | HEIGHT: 66 IN | OXYGEN SATURATION: 97 % | SYSTOLIC BLOOD PRESSURE: 126 MMHG

## 2018-11-15 DIAGNOSIS — I10 ESSENTIAL HYPERTENSION: Primary | ICD-10-CM

## 2018-11-15 DIAGNOSIS — F51.03 PARADOXICAL INSOMNIA: ICD-10-CM

## 2018-11-15 DIAGNOSIS — E78.5 DYSLIPIDEMIA: ICD-10-CM

## 2018-11-15 PROCEDURE — 99213 OFFICE O/P EST LOW 20 MIN: CPT | Performed by: FAMILY MEDICINE

## 2018-11-15 RX ORDER — ZOLPIDEM TARTRATE 5 MG/1
5 TABLET ORAL
Qty: 30 TABLET | Refills: 0 | Status: SHIPPED | OUTPATIENT
Start: 2018-11-15 | End: 2019-01-03

## 2018-11-15 NOTE — PROGRESS NOTES
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  Offers no complaints today      Historical Information   Past Medical History:   Diagnosis Date    CAD (coronary artery disease)     Chronic back pain     Chronic diastolic CHF (congestive heart failure) (HCC)     COPD (chronic obstructive pulmonary disease) (HCC)     HLD (hyperlipidemia)     Hypertension     ALEXYS (obstructive sleep apnea)     Shortness of breath     Vitamin D deficiency      Past Surgical History:   Procedure Laterality Date    DENTAL SURGERY      OK COLONOSCOPY FLX DX W/COLLJ SPEC WHEN PFRMD N/A 10/6/2017    Procedure: COLONOSCOPY;  Surgeon: Omari Jorge MD;  Location: [facility];  Service: Gastroenterology    TONSILLECTOMY       Social History   History   Alcohol Use    Yes     Comment: social     History   Drug Use No     History   Smoking Status    Current Every Day Smoker    Packs/day: 1 00    Years: 20 00    Types: Cigarettes   Smokeless Tobacco    Never Used     Family History   Problem Relation Age of Onset    Diabetes Mother        Meds/Allergies   Allergies   Allergen Reactions    Penicillins Anaphylaxis       Meds:    Current Outpatient Prescriptions:     albuterol (VENTOLIN HFA) 90 mcg/act inhaler, Inhale 2 puffs every 4 (four) hours as needed, Disp: , Rfl:     budesonide-formoterol (SYMBICORT) 160-4 5 mcg/act inhaler, Inhale 2 puffs daily, Disp: 1 Inhaler, Rfl: 1    furosemide (LASIX) 20 mg tablet, Take 1 tablet (20 mg total) by mouth daily after breakfast, Disp: 30 tablet, Rfl: 1    lisinopril (ZESTRIL) 40 mg tablet, Take 1 tablet (40 mg total) by mouth daily, Disp: 90 tablet, Rfl: 3    metoprolol tartrate (LOPRESSOR) 50 mg tablet, TAKE 1 TABLET BY MOUTH EVERY 12 HOURS, Disp: 60 tablet, Rfl: 0    potassium chloride (K-DUR,KLOR-CON) 10 mEq tablet, take 1 tablet by mouth once daily, Disp: 30 tablet, Rfl: 0    pravastatin (PRAVACHOL) 80 mg tablet, Take 1 tablet (80 mg total) by mouth daily, Disp: 90 tablet, Rfl: 3    RA ASPIRIN EC ADULT LOW ST 81 MG EC tablet, take 1 tablet by mouth once daily, Disp: 30 tablet, Rfl: 0    tiotropium (SPIRIVA HANDIHALER) 18 mcg inhalation capsule, Place 1 capsule (18 mcg total) into inhaler and inhale daily, Disp: 30 capsule, Rfl: 1    zolpidem (AMBIEN) 5 mg tablet, Take 1 tablet (5 mg total) by mouth daily at bedtime For sleep, Disp: 30 tablet, Rfl: 0      REVIEW OF SYSTEMS  Review of Systems   Constitutional: Negative  HENT: Negative  Eyes: Negative  Respiratory: Negative  Cardiovascular: Negative  Gastrointestinal: Negative  Endocrine: Negative  Genitourinary: Negative  Musculoskeletal: Negative  Skin: Negative  Allergic/Immunologic: Negative  Neurological: Negative  Hematological: Negative  Psychiatric/Behavioral: Negative  Current Vitals:   Blood Pressure: 126/80 (11/15/18 1120)  Pulse: 70 (11/15/18 1120)  Temperature: 97 6 °F (36 4 °C) (11/15/18 1120)  Respirations: 18 (11/15/18 1120)  Height: 5' 6" (167 6 cm) (11/15/18 1120)  Weight - Scale: 113 kg (250 lb) (11/15/18 1120)  SpO2: 97 % (11/15/18 1120)  Body mass index is 40 35 kg/m²  PHYSICAL EXAMS:  Physical Exam   Constitutional: He is oriented to person, place, and time  61 yo obese male who appears in NAD  HENT:   Head: Normocephalic and atraumatic  Right Ear: External ear normal    Left Ear: External ear normal    Eyes: Pupils are equal, round, and reactive to light  Neck: Normal range of motion  Neck supple  No thyromegaly present  Cardiovascular: Normal rate, regular rhythm and normal heart sounds      Pulmonary/Chest: Effort normal and breath sounds normal  He has no wheezes  He has no rales  Musculoskeletal: Normal range of motion  He exhibits no edema  Neurological: He is alert and oriented to person, place, and time  No cranial nerve deficit  Psychiatric: He has a normal mood and affect  Lab Results:          Becky Sparks PA-C  11/15/2018, 11:24 AM

## 2018-11-19 ENCOUNTER — OFFICE VISIT (OUTPATIENT)
Dept: CARDIOLOGY CLINIC | Facility: HOSPITAL | Age: 58
End: 2018-11-19
Payer: COMMERCIAL

## 2018-11-19 VITALS
DIASTOLIC BLOOD PRESSURE: 68 MMHG | BODY MASS INDEX: 40.18 KG/M2 | SYSTOLIC BLOOD PRESSURE: 130 MMHG | WEIGHT: 250 LBS | HEIGHT: 66 IN | HEART RATE: 77 BPM

## 2018-11-19 DIAGNOSIS — I50.32 CHRONIC DIASTOLIC CHF (CONGESTIVE HEART FAILURE) (HCC): ICD-10-CM

## 2018-11-19 DIAGNOSIS — E78.00 HYPERCHOLESTEROLEMIA: ICD-10-CM

## 2018-11-19 DIAGNOSIS — R52 GENERALIZED PAIN: ICD-10-CM

## 2018-11-19 DIAGNOSIS — I10 ESSENTIAL HYPERTENSION: ICD-10-CM

## 2018-11-19 DIAGNOSIS — G47.33 OBSTRUCTIVE SLEEP APNEA: ICD-10-CM

## 2018-11-19 DIAGNOSIS — I25.10 ARTERIOSCLEROTIC CORONARY ARTERY DISEASE: Primary | ICD-10-CM

## 2018-11-19 PROCEDURE — 99214 OFFICE O/P EST MOD 30 MIN: CPT | Performed by: INTERNAL MEDICINE

## 2018-11-19 RX ORDER — POTASSIUM CHLORIDE 750 MG/1
10 TABLET, EXTENDED RELEASE ORAL DAILY
Qty: 90 TABLET | Refills: 3 | Status: SHIPPED | OUTPATIENT
Start: 2018-11-19 | End: 2019-06-26 | Stop reason: SDUPTHER

## 2018-11-19 RX ORDER — ASPIRIN 81 MG/1
81 TABLET ORAL DAILY
Qty: 90 TABLET | Refills: 3 | Status: SHIPPED | OUTPATIENT
Start: 2018-11-19 | End: 2019-06-26 | Stop reason: SDUPTHER

## 2018-11-19 NOTE — PROGRESS NOTES
Cardiology Follow Up    Charlotte Logan  1960  [id number]  [address] [facility] [place] [address] [address]    1  Arteriosclerotic coronary artery disease     2  Chronic diastolic CHF (congestive heart failure) ([address] )     3  Essential hypertension  potassium chloride (K-DUR,KLOR-CON) 10 mEq tablet   4  Obstructive sleep apnea     5  Hypercholesterolemia     6  Generalized pain  aspirin (RA ASPIRIN EC ADULT LOW ST) 81 mg EC tablet       Discussion/Summary:  Overall he is doing well from a cardiac standpoint  He has been doing a little more activity at work in functional capacity has been improving  I did  the need to quit smoking  Coronary disease stable without active angina  Blood pressure has been better controlled  Continue current dose of statin he had a hard time tolerating higher intensity lipid lowering agents  Interval History:  Routine follow-up visit  Overall patient has been doing well with good functional capacity  He denies any chest pain, shortness of breath, palpitations, lightheadedness, dizziness, or syncope  Unfortunately he continues to smoke  Coronary disease has been stable he has no active angina  He is now working at the [employer] and 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  He has no significant exertional limitations       Problem List     Alcohol use    Chronic obstructive pulmonary disease (HCC)    Essential hypertension    Arteriosclerotic coronary artery disease    Overview Signed 3/12/2018  8:39 AM by Karen Pinzon PA-C     Description: non-obstructive         Hypertension    Hypercholesterolemia    Chronic back pain    Chronic diastolic CHF (congestive heart failure) (HCC)    Obstructive sleep apnea    Asthma    Bilateral low back pain without sciatica    Diastolic dysfunction    Disc degeneration, lumbar    Hyperplastic polyp of sigmoid colon    Lumbar radiculopathy    Nonalcoholic fatty liver disease    Osteoporosis without current pathological fracture    Sacroiliitis (HCC)    Vitamin D deficiency        Past Medical History:   Diagnosis Date    CAD (coronary artery disease)     Chronic back pain     Chronic diastolic CHF (congestive heart failure) (HCC)     COPD (chronic obstructive pulmonary disease) (HCC)     HLD (hyperlipidemia)     Hypertension     ALEXYS (obstructive sleep apnea)     Shortness of breath     Vitamin D deficiency      Social History     Social History    Marital status: Single     Spouse name: N/A    Number of children: N/A    Years of education: N/A     Occupational History    Not on file       Social History Main Topics    Smoking status: Current Every Day Smoker     Packs/day: 1 00     Years: 20 00     Types: Cigarettes    Smokeless tobacco: Never Used    Alcohol use Yes      Comment: social    Drug use: No    Sexual activity: Not on file     Other Topics Concern    Not on file     Social History Narrative    No narrative on file      Family History   Problem Relation Age of Onset    Diabetes Mother      Past Surgical History:   Procedure Laterality Date    DENTAL SURGERY      CO COLONOSCOPY FLX DX W/COLLJ SPEC WHEN PFRMD N/A 10/6/2017    Procedure: COLONOSCOPY;  Surgeon: Fannie Bridges MD;  Location: [facility];  Service: Gastroenterology    TONSILLECTOMY         Current Outpatient Prescriptions:     albuterol (VENTOLIN HFA) 90 mcg/act inhaler, Inhale 2 puffs every 4 (four) hours as needed, Disp: , Rfl:     aspirin (RA ASPIRIN EC ADULT LOW ST) 81 mg EC tablet, Take 1 tablet (81 mg total) by mouth daily, Disp: 90 tablet, Rfl: 3    budesonide-formoterol (SYMBICORT) 160-4 5 mcg/act inhaler, Inhale 2 puffs daily, Disp: 1 Inhaler, Rfl: 1    furosemide (LASIX) 20 mg tablet, Take 1 tablet (20 mg total) by mouth daily after breakfast, Disp: 30 tablet, Rfl: 1    lisinopril (ZESTRIL) 40 mg tablet, Take 1 tablet (40 mg total) by mouth daily, Disp: 90 tablet, Rfl: 3    metoprolol tartrate (LOPRESSOR) 50 mg tablet, TAKE 1 TABLET BY MOUTH EVERY 12 HOURS, Disp: 60 tablet, Rfl: 0    potassium chloride (K-DUR,KLOR-CON) 10 mEq tablet, Take 1 tablet (10 mEq total) by mouth daily, Disp: 90 tablet, Rfl: 3    pravastatin (PRAVACHOL) 80 mg tablet, Take 1 tablet (80 mg total) by mouth daily, Disp: 90 tablet, Rfl: 3    tiotropium (SPIRIVA HANDIHALER) 18 mcg inhalation capsule, Place 1 capsule (18 mcg total) into inhaler and inhale daily, Disp: 30 capsule, Rfl: 1    zolpidem (AMBIEN) 5 mg tablet, Take 1 tablet (5 mg total) by mouth daily at bedtime For sleep, Disp: 30 tablet, Rfl: 0  Allergies   Allergen Reactions    Penicillins Anaphylaxis       Labs:     Chemistry        Component Value Date/Time     11/25/2015 1200    K 4 2 05/22/2018 0602    K 3 2 (L) 11/25/2015 1200     05/22/2018 0602     11/25/2015 1200    CO2 26 05/22/2018 0602    CO2 26 4 11/25/2015 1200    BUN 15 05/22/2018 0602    BUN 12 11/25/2015 1200    CREATININE 0 87 05/22/2018 0602    CREATININE 1 02 11/25/2015 1200        Component Value Date/Time    CALCIUM 8 5 05/22/2018 0602    CALCIUM 9 4 11/25/2015 1200    ALKPHOS 88 05/21/2018 0522    ALKPHOS 95 11/25/2015 1200    AST 15 05/21/2018 0522    AST 23 11/25/2015 1200    ALT 33 05/21/2018 0522    ALT 53 11/25/2015 1200    BILITOT 0 71 11/25/2015 1200            Lab Results   Component Value Date    CHOL 194 11/24/2015    CHOL 195 06/05/2015     Lab Results   Component Value Date    HDL 65 (H) 05/22/2018    HDL 51 11/21/2017    HDL 51 12/07/2016     Lab Results   Component Value Date    LDLCALC 114 (H) 05/22/2018    LDLCALC 99 11/21/2017    LDLCALC 119 (H) 12/07/2016     Lab Results   Component Value Date    TRIG 130 05/22/2018    TRIG 126 11/21/2017    TRIG 175 (H) 12/07/2016     No results found for: CHOLHDL    Imaging: No results found      ECG:        ROS    Vitals:    11/19/18 1536 BP: 130/68   Pulse: 77     Vitals:    11/19/18 1536   Weight: 113 kg (250 lb)     Height: 5' 6" (167 6 cm)   Body mass index is 40 35 kg/m²      Physical Exam: BP manual 136/82  General appearance:  Appears stated age, alert, well appearing and in no distress  HEENT:  PERRLA, EOMI, no scleral icterus, no conjunctival pallor  NECK:  Supple, No elevated JVP, no thyromegaly, no carotid bruits  HEART:  Regular rate and rhythm, normal S1/S2, no S3/S4, no murmur or rub  LUNGS:  Clear to auscultation bilaterally, no wheezes rales or rhonchi  ABDOMEN:  Soft, non-tender, positive bowel sounds, no rebound or guarding, no organomegaly   EXTREMITIES:  No edema, normal range of motion  VASCULAR:  Normal pedal pulses, good pulse volume   SKIN: No lesions or rashes on exposed skin  NEURO:  CN II-XII intact, no focal deficits

## 2018-12-03 ENCOUNTER — PATIENT OUTREACH (OUTPATIENT)
Dept: OTHER | Facility: HOSPITAL | Age: 58
End: 2018-12-03

## 2018-12-03 DIAGNOSIS — J44.9 CHRONIC OBSTRUCTIVE PULMONARY DISEASE, UNSPECIFIED COPD TYPE (HCC): ICD-10-CM

## 2018-12-03 RX ORDER — TIOTROPIUM BROMIDE 18 UG/1
CAPSULE ORAL; RESPIRATORY (INHALATION)
Qty: 30 CAPSULE | Refills: 1 | Status: SHIPPED | OUTPATIENT
Start: 2018-12-03 | End: 2019-06-26 | Stop reason: SDUPTHER

## 2018-12-05 DIAGNOSIS — I10 ESSENTIAL HYPERTENSION: ICD-10-CM

## 2018-12-05 RX ORDER — FUROSEMIDE 20 MG/1
TABLET ORAL
Qty: 30 TABLET | Refills: 1 | Status: SHIPPED | OUTPATIENT
Start: 2018-12-05 | End: 2019-06-26 | Stop reason: SDUPTHER

## 2019-01-01 DIAGNOSIS — M51.9 LUMBAR DISC DISEASE: ICD-10-CM

## 2019-01-03 ENCOUNTER — HOSPITAL ENCOUNTER (EMERGENCY)
Facility: HOSPITAL | Age: 59
Discharge: HOME | End: 2019-01-03
Attending: EMERGENCY MEDICINE

## 2019-01-03 VITALS
TEMPERATURE: 97.8 F | DIASTOLIC BLOOD PRESSURE: 86 MMHG | HEART RATE: 108 BPM | SYSTOLIC BLOOD PRESSURE: 173 MMHG | WEIGHT: 249.12 LBS | BODY MASS INDEX: 40.21 KG/M2 | RESPIRATION RATE: 20 BRPM | OXYGEN SATURATION: 98 %

## 2019-01-03 DIAGNOSIS — K52.9 GASTROENTERITIS: Primary | ICD-10-CM

## 2019-01-03 LAB
ALBUMIN SERPL BCP-MCNC: 3.4 G/DL (ref 3.5–5)
ALP SERPL-CCNC: 104 U/L (ref 46–116)
ALT SERPL W P-5'-P-CCNC: 52 U/L (ref 12–78)
ANION GAP SERPL CALCULATED.3IONS-SCNC: 13 MMOL/L (ref 4–13)
AST SERPL W P-5'-P-CCNC: 37 U/L (ref 5–45)
BASOPHILS # BLD AUTO: 0.07 THOUSANDS/ΜL (ref 0–0.1)
BASOPHILS NFR BLD AUTO: 1 % (ref 0–1)
BILIRUB SERPL-MCNC: 0.6 MG/DL (ref 0.2–1)
BUN SERPL-MCNC: 8 MG/DL (ref 5–25)
CALCIUM SERPL-MCNC: 8.8 MG/DL (ref 8.3–10.1)
CHLORIDE SERPL-SCNC: 101 MMOL/L (ref 100–108)
CO2 SERPL-SCNC: 26 MMOL/L (ref 21–32)
CREAT SERPL-MCNC: 0.9 MG/DL (ref 0.6–1.3)
EOSINOPHIL # BLD AUTO: 0.04 THOUSAND/ΜL (ref 0–0.61)
EOSINOPHIL NFR BLD AUTO: 1 % (ref 0–6)
ERYTHROCYTE [DISTWIDTH] IN BLOOD BY AUTOMATED COUNT: 15.4 % (ref 11.6–15.1)
GFR SERPL CREATININE-BSD FRML MDRD: 94 ML/MIN/1.73SQ M
GLUCOSE SERPL-MCNC: 134 MG/DL (ref 65–140)
HCT VFR BLD AUTO: 50.4 % (ref 36.5–49.3)
HGB BLD-MCNC: 16.2 G/DL (ref 12–17)
IMM GRANULOCYTES # BLD AUTO: 0.02 THOUSAND/UL (ref 0–0.2)
IMM GRANULOCYTES NFR BLD AUTO: 0 % (ref 0–2)
LIPASE SERPL-CCNC: 83 U/L (ref 73–393)
LYMPHOCYTES # BLD AUTO: 1.08 THOUSANDS/ΜL (ref 0.6–4.47)
LYMPHOCYTES NFR BLD AUTO: 12 % (ref 14–44)
MCH RBC QN AUTO: 28.8 PG (ref 26.8–34.3)
MCHC RBC AUTO-ENTMCNC: 32.1 G/DL (ref 31.4–37.4)
MCV RBC AUTO: 90 FL (ref 82–98)
MONOCYTES # BLD AUTO: 0.71 THOUSAND/ΜL (ref 0.17–1.22)
MONOCYTES NFR BLD AUTO: 8 % (ref 4–12)
NEUTROPHILS # BLD AUTO: 6.82 THOUSANDS/ΜL (ref 1.85–7.62)
NEUTS SEG NFR BLD AUTO: 78 % (ref 43–75)
NRBC BLD AUTO-RTO: 0 /100 WBCS
PLATELET # BLD AUTO: 200 THOUSANDS/UL (ref 149–390)
PMV BLD AUTO: 9.9 FL (ref 8.9–12.7)
POTASSIUM SERPL-SCNC: 3.7 MMOL/L (ref 3.5–5.3)
PROT SERPL-MCNC: 8 G/DL (ref 6.4–8.2)
RBC # BLD AUTO: 5.63 MILLION/UL (ref 3.88–5.62)
SODIUM SERPL-SCNC: 140 MMOL/L (ref 136–145)
WBC # BLD AUTO: 8.74 THOUSAND/UL (ref 4.31–10.16)

## 2019-01-03 PROCEDURE — 99283 EMERGENCY DEPT VISIT LOW MDM: CPT

## 2019-01-03 PROCEDURE — 96361 HYDRATE IV INFUSION ADD-ON: CPT

## 2019-01-03 PROCEDURE — 80053 COMPREHEN METABOLIC PANEL: CPT | Performed by: EMERGENCY MEDICINE

## 2019-01-03 PROCEDURE — 83690 ASSAY OF LIPASE: CPT | Performed by: EMERGENCY MEDICINE

## 2019-01-03 PROCEDURE — 96374 THER/PROPH/DIAG INJ IV PUSH: CPT

## 2019-01-03 PROCEDURE — 85025 COMPLETE CBC W/AUTO DIFF WBC: CPT | Performed by: EMERGENCY MEDICINE

## 2019-01-03 RX ORDER — ONDANSETRON 4 MG/1
8 TABLET, FILM COATED ORAL EVERY 8 HOURS PRN
Qty: 12 TABLET | Refills: 0 | Status: SHIPPED | OUTPATIENT
Start: 2019-01-03

## 2019-01-03 RX ORDER — ONDANSETRON 2 MG/ML
4 INJECTION INTRAMUSCULAR; INTRAVENOUS ONCE
Status: COMPLETED | OUTPATIENT
Start: 2019-01-03 | End: 2019-01-03

## 2019-01-03 RX ADMIN — SODIUM CHLORIDE 1000 ML: 0.9 INJECTION, SOLUTION INTRAVENOUS at 15:25

## 2019-01-03 RX ADMIN — ONDANSETRON HYDROCHLORIDE 4 MG: 2 INJECTION, SOLUTION INTRAMUSCULAR; INTRAVENOUS at 15:26

## 2019-01-03 NOTE — DISCHARGE INSTRUCTIONS
Acute Nausea and Vomiting   WHAT YOU NEED TO KNOW:   Acute nausea and vomiting start suddenly, worsen quickly, and last a short time  DISCHARGE INSTRUCTIONS:   Seek care immediately if:   · You see blood in your vomit or your bowel movements  · You have sudden, severe pain in your chest and upper abdomen after hard vomiting or retching  · You have swelling in your neck and chest      · You are dizzy, cold, and thirsty and your eyes and mouth are dry  · You are urinating very little or not at all  · You have muscle weakness, leg cramps, and trouble breathing  · Your heart is beating much faster than normal      · You continue to vomit for more than 48 hours  Contact your healthcare provider if:   · You have frequent dry heaves (vomiting but nothing comes out)  · Your nausea and vomiting does not get better or go away after you use medicine  · You have questions or concerns about your condition or treatment  Medicines: You may need any of the following:  · Medicines  may be given to calm your stomach and stop your vomiting  You may also need medicines to help you feel more relaxed or to stop nausea and vomiting caused by motion sickness  · Gastrointestinal stimulants  are used to help empty your stomach and bowels  This may help decrease nausea and vomiting  · Take your medicine as directed  Contact your healthcare provider if you think your medicine is not helping or if you have side effects  Tell him or her if you are allergic to any medicine  Keep a list of the medicines, vitamins, and herbs you take  Include the amounts, and when and why you take them  Bring the list or the pill bottles to follow-up visits  Carry your medicine list with you in case of an emergency  Prevent or manage acute nausea and vomiting:   · Do not drink alcohol  Alcohol may upset or irritate your stomach  Too much alcohol can also cause acute nausea and vomiting  · Control stress    Headaches due to stress may cause nausea and vomiting  Find ways to relax and manage your stress  Get more rest and sleep  · Drink more liquids as directed  Vomiting can lead to dehydration  It is important to drink more liquids to help replace lost body fluids  Ask your healthcare provider how much liquid to drink each day and which liquids are best for you  Your provider may recommend that you drink an oral rehydration solution (ORS)  ORS contains water, salts, and sugar that are needed to replace the lost body fluids  Ask what kind of ORS to use, how much to drink, and where to get it  · Eat smaller meals, more often  Eat small amounts of food every 2 to 3 hours, even if you are not hungry  Food in your stomach may decrease your nausea  · Talk to your healthcare provider before you take over-the-counter (OTC) medicines  These medicines can cause serious problems if you use certain other medicines, or you have a medical condition  You may have problems if you use too much or use them for longer than the label says  Follow directions on the label carefully  Follow up with your healthcare provider as directed:  Write down your questions so you remember to ask them during your visits  © 2017 [facility] Information is for End User's use only and may not be sold, redistributed or otherwise used for commercial purposes  All illustrations and images included in CareNotes® are the copyrighted property of [employer] , [employer]  or Clement Heaton  The above information is an  only  It is not intended as medical advice for individual conditions or treatments  Talk to your doctor, nurse or pharmacist before following any medical regimen to see if it is safe and effective for you

## 2019-01-03 NOTE — ED PROVIDER NOTES
History  Chief Complaint   Patient presents with    Vomiting     ate calamari which was left outn 3 days ago now is vomiting and diarrhea       Not eaten/ drank past 2 days        History provided by:  Patient   used: No    Vomiting   Severity:  Moderate  Duration:  3 days  Timing:  Constant  Quality:  Bilious material  Feeding tolerance: nothing  How soon after eating does vomiting occur:  15 minutes  Progression:  Unchanged  Recent urination:  Decreased  Context: not post-tussive and not self-induced    Relieved by:  Nothing  Worsened by:  Food smell  Ineffective treatments:  None tried  Associated symptoms: abdominal pain (generalized ) and diarrhea (semi-solid; not profuse watery)    Associated symptoms: no arthralgias, no chills, no cough, no fever, no headaches, no myalgias, no sore throat and no URI        Prior to Admission Medications   Prescriptions Last Dose Informant Patient Reported? Taking?    Phoebe Horner 18 MCG inhalation capsule   No Yes   Sig: inhale contents of 1 capsule by mouth once daily   albuterol (VENTOLIN HFA) 90 mcg/act inhaler   Yes Yes   Sig: Inhale 2 puffs every 4 (four) hours as needed   aspirin (RA ASPIRIN EC ADULT LOW ST) 81 mg EC tablet   No Yes   Sig: Take 1 tablet (81 mg total) by mouth daily   budesonide-formoterol (SYMBICORT) 160-4 5 mcg/act inhaler   No Yes   Sig: Inhale 2 puffs daily   furosemide (LASIX) 20 mg tablet   No Yes   Sig: TAKE 1 TABLET BY MOUTH DAILY AFTER BREAKFAST   lisinopril (ZESTRIL) 40 mg tablet   No Yes   Sig: Take 1 tablet (40 mg total) by mouth daily   metoprolol tartrate (LOPRESSOR) 50 mg tablet   No Yes   Sig: TAKE 1 TABLET BY MOUTH EVERY 12 HOURS   potassium chloride (K-DUR,KLOR-CON) 10 mEq tablet   No Yes   Sig: Take 1 tablet (10 mEq total) by mouth daily   pravastatin (PRAVACHOL) 80 mg tablet   No Yes   Sig: Take 1 tablet (80 mg total) by mouth daily      Facility-Administered Medications: None       Past Medical History: Diagnosis Date    CAD (coronary artery disease)     Chronic back pain     Chronic diastolic CHF (congestive heart failure) (HCC)     COPD (chronic obstructive pulmonary disease) (HCC)     HLD (hyperlipidemia)     Hypertension     ALEXYS (obstructive sleep apnea)     Shortness of breath     Vitamin D deficiency        Past Surgical History:   Procedure Laterality Date    DENTAL SURGERY      AR COLONOSCOPY FLX DX W/COLLJ SPEC WHEN PFRMD N/A 10/6/2017    Procedure: COLONOSCOPY;  Surgeon: Quinton Bell MD;  Location: [facility];  Service: Gastroenterology    TONSILLECTOMY         Family History   Problem Relation Age of Onset    Diabetes Mother      I have reviewed and agree with the history as documented  Social History   Substance Use Topics    Smoking status: Current Every Day Smoker     Packs/day: 1 00     Years: 20 00     Types: Cigarettes    Smokeless tobacco: Never Used    Alcohol use No      Comment: social        Review of Systems   Constitutional: Negative  Negative for chills and fever  HENT: Negative  Negative for sore throat  Eyes: Negative  Respiratory: Negative  Negative for cough  Cardiovascular: Negative  Gastrointestinal: Positive for abdominal pain (generalized ), diarrhea (semi-solid; not profuse watery) and vomiting  Endocrine: Negative  Genitourinary: Negative  Musculoskeletal: Negative  Negative for arthralgias and myalgias  Skin: Negative  Allergic/Immunologic: Negative  Neurological: Negative  Negative for headaches  Hematological: Negative  Psychiatric/Behavioral: Negative  All other systems reviewed and are negative  Physical Exam  Physical Exam   Constitutional: He is oriented to person, place, and time  He appears well-developed and well-nourished  No distress  Smiling; a very pleasant individual   HENT:   Head: Normocephalic and atraumatic     Right Ear: External ear normal    Left Ear: External ear normal    Nose: Nose normal  Mouth/Throat: Oropharynx is clear and moist    Eyes: Pupils are equal, round, and reactive to light  Conjunctivae and EOM are normal    Neck: Normal range of motion  Neck supple  No JVD present  No tracheal deviation present  No thyromegaly present  Cardiovascular: Normal rate, regular rhythm, normal heart sounds and intact distal pulses  Exam reveals no gallop and no friction rub  No murmur heard  Pulmonary/Chest: Effort normal and breath sounds normal  No stridor  No respiratory distress  He has no wheezes  He has no rales  He exhibits no tenderness  Abdominal: Soft  Bowel sounds are normal  He exhibits no distension and no mass  There is no tenderness  There is no rebound and no guarding  No hernia  Genitourinary: Rectal exam shows guaiac negative stool  Musculoskeletal: Normal range of motion  He exhibits no edema, tenderness or deformity  Lymphadenopathy:     He has no cervical adenopathy  Neurological: He is alert and oriented to person, place, and time  He displays normal reflexes  No cranial nerve deficit or sensory deficit  He exhibits normal muscle tone  Coordination normal    Skin: Skin is warm and dry  Capillary refill takes less than 2 seconds  No rash noted  He is not diaphoretic  No erythema  No pallor  Psychiatric: He has a normal mood and affect  His behavior is normal  Judgment and thought content normal    Nursing note and vitals reviewed        Vital Signs  ED Triage Vitals   Temperature Pulse Respirations Blood Pressure SpO2   01/03/19 1503 01/03/19 1503 01/03/19 1503 01/03/19 1506 01/03/19 1503   97 8 °F (36 6 °C) (!) 108 20 (!) 173/86 98 %      Temp Source Heart Rate Source Patient Position - Orthostatic VS BP Location FiO2 (%)   01/03/19 1503 01/03/19 1503 01/03/19 1503 01/03/19 1503 --   Temporal Right Sitting Right arm       Pain Score       01/03/19 1503       5           Vitals:    01/03/19 1503 01/03/19 1506   BP:  (!) 173/86   Pulse: (!) 108    Patient Position - Orthostatic VS: Sitting        Visual Acuity      ED Medications  Medications   sodium chloride 0 9 % bolus 1,000 mL (0 mL Intravenous Stopped 1/3/19 1707)   ondansetron (ZOFRAN) injection 4 mg (4 mg Intravenous Given 1/3/19 1526)       Diagnostic Studies  Results Reviewed     Procedure Component Value Units Date/Time    Comprehensive metabolic panel [[id number]]  (Abnormal) Collected:  01/03/19 1519    Lab Status:  Final result Specimen:  Blood from Arm, Right Updated:  01/03/19 1546     Sodium 140 mmol/L      Potassium 3 7 mmol/L      Chloride 101 mmol/L      CO2 26 mmol/L      ANION GAP 13 mmol/L      BUN 8 mg/dL      Creatinine 0 90 mg/dL      Glucose 134 mg/dL      Calcium 8 8 mg/dL      AST 37 U/L      ALT 52 U/L      Alkaline Phosphatase 104 U/L      Total Protein 8 0 g/dL      Albumin 3 4 (L) g/dL      Total Bilirubin 0 60 mg/dL      eGFR 94 ml/min/1 73sq m     Narrative:         National Kidney Disease Education Program recommendations are as follows:  GFR calculation is accurate only with a steady state creatinine  Chronic Kidney disease less than 60 ml/min/1 73 sq  meters  Kidney failure less than 15 ml/min/1 73 sq  meters      Lipase [[id number]]  (Normal) Collected:  01/03/19 1519    Lab Status:  Final result Specimen:  Blood from Arm, Right Updated:  01/03/19 1546     Lipase 83 u/L     CBC and differential [[id number]]  (Abnormal) Collected:  01/03/19 1519    Lab Status:  Final result Specimen:  Blood from Arm, Right Updated:  01/03/19 1525     WBC 8 74 Thousand/uL      RBC 5 63 (H) Million/uL      Hemoglobin 16 2 g/dL      Hematocrit 50 4 (H) %      MCV 90 fL      MCH 28 8 pg      MCHC 32 1 g/dL      RDW 15 4 (H) %      MPV 9 9 fL      Platelets 543 Thousands/uL      nRBC 0 /100 WBCs      Neutrophils Relative 78 (H) %      Immat GRANS % 0 %      Lymphocytes Relative 12 (L) %      Monocytes Relative 8 %      Eosinophils Relative 1 %      Basophils Relative 1 %      Neutrophils Absolute 6 82 Thousands/µL Immature Grans Absolute 0 02 Thousand/uL      Lymphocytes Absolute 1 08 Thousands/µL      Monocytes Absolute 0 71 Thousand/µL      Eosinophils Absolute 0 04 Thousand/µL      Basophils Absolute 0 07 Thousands/µL                  No orders to display              Procedures  Procedures       Phone Contacts  ED Phone Contact    ED Course  ED Course as of Jan 03 Lemon Current Jan 03, 2019   1559 Hospitalist, Dr Yamileth Martin, came down to evaluate pt after phone discussion  1700 Pt resting very comfortably  Feels markedly better  Ready to go home  1701 States he is eating much better these days  No more fatty foods  Has lost ~ 20 lbs  Only vice is cigarette smoking which he can quick easily  Smokes only out of boredom  MDM  Number of Diagnoses or Management Options     Amount and/or Complexity of Data Reviewed  Clinical lab tests: ordered and reviewed  Tests in the medicine section of CPT®: ordered and reviewed    Patient Progress  Patient progress: improved    CritCare Time    Disposition  Final diagnoses:   Gastroenteritis     Time reflects when diagnosis was documented in both MDM as applicable and the Disposition within this note     Time User Action Codes Description Comment    1/3/2019  5:03 PM Norvel Curling Add [K52 9] Gastroenteritis       ED Disposition     ED Disposition Condition Comment    Discharge  Amy Alarcon discharge to home/self care      Condition at discharge: Stable        Follow-up Information     Follow up With Specialties Details Why Contact Info    Sayda Morales PA-C Family Medicine, Physician Assistant In 1 day  [address] [place] [address]  [phone]            Discharge Medication List as of 1/3/2019  5:05 PM      CONTINUE these medications which have NOT CHANGED    Details   albuterol (VENTOLIN HFA) 90 mcg/act inhaler Inhale 2 puffs every 4 (four) hours as needed, Starting Tue 12/10/2013, Historical Med      aspirin (RA ASPIRIN EC ADULT LOW ST) 81 mg EC tablet Take 1 tablet (81 mg total) by mouth daily, Starting Mon 11/19/2018, Normal      budesonide-formoterol (SYMBICORT) 160-4 5 mcg/act inhaler Inhale 2 puffs daily, Starting Mon 10/1/2018, Normal      furosemide (LASIX) 20 mg tablet TAKE 1 TABLET BY MOUTH DAILY AFTER BREAKFAST, Normal      lisinopril (ZESTRIL) 40 mg tablet Take 1 tablet (40 mg total) by mouth daily, Starting Wed 10/3/2018, Normal      metoprolol tartrate (LOPRESSOR) 50 mg tablet TAKE 1 TABLET BY MOUTH EVERY 12 HOURS, Normal      potassium chloride (K-DUR,KLOR-CON) 10 mEq tablet Take 1 tablet (10 mEq total) by mouth daily, Starting Mon 11/19/2018, Normal      pravastatin (PRAVACHOL) 80 mg tablet Take 1 tablet (80 mg total) by mouth daily, Starting Wed 10/3/2018, Normal      SPIRIVA HANDIHALER 18 MCG inhalation capsule inhale contents of 1 capsule by mouth once daily, Normal           No discharge procedures on file      ED Provider  Electronically Signed by           Shawna Rodgers MD  01/03/19 7639

## 2019-01-07 ENCOUNTER — PATIENT OUTREACH (OUTPATIENT)
Dept: CASE MANAGEMENT | Facility: OTHER | Age: 59
End: 2019-01-07

## 2019-01-21 DIAGNOSIS — J44.9 CHRONIC OBSTRUCTIVE PULMONARY DISEASE, UNSPECIFIED COPD TYPE (HCC): Primary | ICD-10-CM

## 2019-01-31 ENCOUNTER — TELEPHONE (OUTPATIENT)
Dept: FAMILY MEDICINE CLINIC | Facility: CLINIC | Age: 59
End: 2019-01-31

## 2019-02-18 ENCOUNTER — PATIENT OUTREACH (OUTPATIENT)
Dept: OTHER | Facility: HOSPITAL | Age: 59
End: 2019-02-18

## 2019-02-18 NOTE — PROGRESS NOTES
Called Rick lopez to verify  coverage  Spoke with Josette Michel who reports patients coverage terminated 12/31/18    Last [facility]  assessment completed and uploaded into [facility] 6 19 18  SNP assessment not completed 12/12/18   Britney Robles made aware  Yearly surveillance episode closed

## 2019-05-14 ENCOUNTER — APPOINTMENT (EMERGENCY)
Dept: CT IMAGING | Facility: HOSPITAL | Age: 59
End: 2019-05-14
Payer: COMMERCIAL

## 2019-05-14 ENCOUNTER — HOSPITAL ENCOUNTER (EMERGENCY)
Facility: HOSPITAL | Age: 59
Discharge: HOME | End: 2019-05-14
Attending: EMERGENCY MEDICINE | Admitting: EMERGENCY MEDICINE
Payer: COMMERCIAL

## 2019-05-14 VITALS
RESPIRATION RATE: 18 BRPM | BODY MASS INDEX: 37.59 KG/M2 | SYSTOLIC BLOOD PRESSURE: 164 MMHG | DIASTOLIC BLOOD PRESSURE: 89 MMHG | WEIGHT: 233.91 LBS | HEART RATE: 63 BPM | OXYGEN SATURATION: 97 % | HEIGHT: 66 IN | TEMPERATURE: 98.4 F

## 2019-05-14 DIAGNOSIS — S22.41XA CLOSED FRACTURE OF MULTIPLE RIBS OF RIGHT SIDE, INITIAL ENCOUNTER: Primary | ICD-10-CM

## 2019-05-14 DIAGNOSIS — J44.9 CHRONIC OBSTRUCTIVE PULMONARY DISEASE, UNSPECIFIED COPD TYPE (HCC): ICD-10-CM

## 2019-05-14 DIAGNOSIS — Z76.0 MEDICATION REFILL: ICD-10-CM

## 2019-05-14 LAB
ABO GROUP BLD: NORMAL
ALBUMIN SERPL BCP-MCNC: 3.3 G/DL (ref 3.5–5)
ALP SERPL-CCNC: 95 U/L (ref 46–116)
ALT SERPL W P-5'-P-CCNC: 33 U/L (ref 12–78)
ANION GAP SERPL CALCULATED.3IONS-SCNC: 9 MMOL/L (ref 4–13)
AST SERPL W P-5'-P-CCNC: 18 U/L (ref 5–45)
BASOPHILS # BLD AUTO: 0.07 THOUSANDS/ΜL (ref 0–0.1)
BASOPHILS NFR BLD AUTO: 1 % (ref 0–1)
BILIRUB SERPL-MCNC: 0.7 MG/DL (ref 0.2–1)
BLD GP AB SCN SERPL QL: NEGATIVE
BUN SERPL-MCNC: 11 MG/DL (ref 5–25)
CALCIUM SERPL-MCNC: 8.5 MG/DL (ref 8.3–10.1)
CHLORIDE SERPL-SCNC: 104 MMOL/L (ref 100–108)
CO2 SERPL-SCNC: 29 MMOL/L (ref 21–32)
CREAT SERPL-MCNC: 1.05 MG/DL (ref 0.6–1.3)
EOSINOPHIL # BLD AUTO: 0.12 THOUSAND/ΜL (ref 0–0.61)
EOSINOPHIL NFR BLD AUTO: 1 % (ref 0–6)
ERYTHROCYTE [DISTWIDTH] IN BLOOD BY AUTOMATED COUNT: 13.9 % (ref 11.6–15.1)
GFR SERPL CREATININE-BSD FRML MDRD: 78 ML/MIN/1.73SQ M
GLUCOSE SERPL-MCNC: 118 MG/DL (ref 65–140)
HCT VFR BLD AUTO: 47.5 % (ref 36.5–49.3)
HGB BLD-MCNC: 14.9 G/DL (ref 12–17)
IMM GRANULOCYTES # BLD AUTO: 0.04 THOUSAND/UL (ref 0–0.2)
IMM GRANULOCYTES NFR BLD AUTO: 0 % (ref 0–2)
LYMPHOCYTES # BLD AUTO: 1.91 THOUSANDS/ΜL (ref 0.6–4.47)
LYMPHOCYTES NFR BLD AUTO: 15 % (ref 14–44)
MCH RBC QN AUTO: 29.4 PG (ref 26.8–34.3)
MCHC RBC AUTO-ENTMCNC: 31.4 G/DL (ref 31.4–37.4)
MCV RBC AUTO: 94 FL (ref 82–98)
MONOCYTES # BLD AUTO: 0.9 THOUSAND/ΜL (ref 0.17–1.22)
MONOCYTES NFR BLD AUTO: 7 % (ref 4–12)
NEUTROPHILS # BLD AUTO: 9.67 THOUSANDS/ΜL (ref 1.85–7.62)
NEUTS SEG NFR BLD AUTO: 76 % (ref 43–75)
NRBC BLD AUTO-RTO: 0 /100 WBCS
PLATELET # BLD AUTO: 230 THOUSANDS/UL (ref 149–390)
PMV BLD AUTO: 10.8 FL (ref 8.9–12.7)
POTASSIUM SERPL-SCNC: 3.3 MMOL/L (ref 3.5–5.3)
PROT SERPL-MCNC: 7.4 G/DL (ref 6.4–8.2)
RBC # BLD AUTO: 5.06 MILLION/UL (ref 3.88–5.62)
RH BLD: POSITIVE
SODIUM SERPL-SCNC: 142 MMOL/L (ref 136–145)
SPECIMEN EXPIRATION DATE: NORMAL
WBC # BLD AUTO: 12.71 THOUSAND/UL (ref 4.31–10.16)

## 2019-05-14 PROCEDURE — 96376 TX/PRO/DX INJ SAME DRUG ADON: CPT

## 2019-05-14 PROCEDURE — 74177 CT ABD & PELVIS W/CONTRAST: CPT

## 2019-05-14 PROCEDURE — 96374 THER/PROPH/DIAG INJ IV PUSH: CPT

## 2019-05-14 PROCEDURE — 85025 COMPLETE CBC W/AUTO DIFF WBC: CPT | Performed by: EMERGENCY MEDICINE

## 2019-05-14 PROCEDURE — 86900 BLOOD TYPING SEROLOGIC ABO: CPT | Performed by: EMERGENCY MEDICINE

## 2019-05-14 PROCEDURE — 99285 EMERGENCY DEPT VISIT HI MDM: CPT

## 2019-05-14 PROCEDURE — 80053 COMPREHEN METABOLIC PANEL: CPT | Performed by: EMERGENCY MEDICINE

## 2019-05-14 PROCEDURE — 71260 CT THORAX DX C+: CPT

## 2019-05-14 PROCEDURE — 86901 BLOOD TYPING SEROLOGIC RH(D): CPT | Performed by: EMERGENCY MEDICINE

## 2019-05-14 PROCEDURE — 36415 COLL VENOUS BLD VENIPUNCTURE: CPT | Performed by: EMERGENCY MEDICINE

## 2019-05-14 PROCEDURE — 99285 EMERGENCY DEPT VISIT HI MDM: CPT | Performed by: EMERGENCY MEDICINE

## 2019-05-14 PROCEDURE — 86850 RBC ANTIBODY SCREEN: CPT | Performed by: EMERGENCY MEDICINE

## 2019-05-14 RX ORDER — OXYCODONE HYDROCHLORIDE AND ACETAMINOPHEN 5; 325 MG/1; MG/1
1-2 TABLET ORAL EVERY 6 HOURS PRN
Qty: 20 TABLET | Refills: 0 | Status: SHIPPED | OUTPATIENT
Start: 2019-05-14 | End: 2019-09-23

## 2019-05-14 RX ORDER — BUDESONIDE AND FORMOTEROL FUMARATE DIHYDRATE 160; 4.5 UG/1; UG/1
2 AEROSOL RESPIRATORY (INHALATION) DAILY
Qty: 1 INHALER | Refills: 0 | Status: SHIPPED | OUTPATIENT
Start: 2019-05-14 | End: 2019-06-26 | Stop reason: SDUPTHER

## 2019-05-14 RX ORDER — MORPHINE SULFATE 4 MG/ML
4 INJECTION, SOLUTION INTRAMUSCULAR; INTRAVENOUS ONCE
Status: COMPLETED | OUTPATIENT
Start: 2019-05-14 | End: 2019-05-14

## 2019-05-14 RX ADMIN — MORPHINE SULFATE 4 MG: 4 INJECTION INTRAVENOUS at 01:33

## 2019-05-14 RX ADMIN — MORPHINE SULFATE 4 MG: 4 INJECTION INTRAVENOUS at 04:11

## 2019-05-14 RX ADMIN — IOHEXOL 100 ML: 350 INJECTION, SOLUTION INTRAVENOUS at 03:38

## 2019-06-26 DIAGNOSIS — E78.5 DYSLIPIDEMIA: ICD-10-CM

## 2019-06-26 DIAGNOSIS — S22.41XA CLOSED FRACTURE OF MULTIPLE RIBS OF RIGHT SIDE, INITIAL ENCOUNTER: ICD-10-CM

## 2019-06-26 DIAGNOSIS — J44.9 CHRONIC OBSTRUCTIVE PULMONARY DISEASE, UNSPECIFIED COPD TYPE (HCC): ICD-10-CM

## 2019-06-26 DIAGNOSIS — R06.02 SOB (SHORTNESS OF BREATH) ON EXERTION: Primary | ICD-10-CM

## 2019-06-26 DIAGNOSIS — R52 GENERALIZED PAIN: ICD-10-CM

## 2019-06-26 DIAGNOSIS — I10 ESSENTIAL HYPERTENSION: ICD-10-CM

## 2019-06-26 DIAGNOSIS — K52.9 GASTROENTERITIS: ICD-10-CM

## 2019-06-26 RX ORDER — PRAVASTATIN SODIUM 80 MG/1
80 TABLET ORAL DAILY
Qty: 30 TABLET | Refills: 0 | Status: SHIPPED | OUTPATIENT
Start: 2019-06-26 | End: 2019-07-03 | Stop reason: SDUPTHER

## 2019-06-26 RX ORDER — LISINOPRIL 40 MG/1
40 TABLET ORAL DAILY
Qty: 30 TABLET | Refills: 0 | Status: SHIPPED | OUTPATIENT
Start: 2019-06-26 | End: 2019-07-03 | Stop reason: SDUPTHER

## 2019-06-26 RX ORDER — ASPIRIN 81 MG/1
81 TABLET ORAL DAILY
Qty: 30 TABLET | Refills: 0 | Status: SHIPPED | OUTPATIENT
Start: 2019-06-26 | End: 2020-08-12 | Stop reason: SDUPTHER

## 2019-06-26 RX ORDER — METOPROLOL TARTRATE 50 MG/1
50 TABLET, FILM COATED ORAL EVERY 12 HOURS
Qty: 60 TABLET | Refills: 0 | Status: SHIPPED | OUTPATIENT
Start: 2019-06-26 | End: 2019-07-03 | Stop reason: SDUPTHER

## 2019-06-26 RX ORDER — ALBUTEROL SULFATE 90 UG/1
2 AEROSOL, METERED RESPIRATORY (INHALATION) EVERY 4 HOURS PRN
Qty: 1 INHALER | Refills: 0 | Status: SHIPPED | OUTPATIENT
Start: 2019-06-26 | End: 2020-12-17 | Stop reason: SDUPTHER

## 2019-06-26 RX ORDER — FUROSEMIDE 20 MG/1
20 TABLET ORAL DAILY
Qty: 30 TABLET | Refills: 0 | Status: SHIPPED | OUTPATIENT
Start: 2019-06-26 | End: 2019-09-09 | Stop reason: SDUPTHER

## 2019-06-26 RX ORDER — POTASSIUM CHLORIDE 750 MG/1
10 TABLET, EXTENDED RELEASE ORAL DAILY
Qty: 30 TABLET | Refills: 0 | Status: SHIPPED | OUTPATIENT
Start: 2019-06-26 | End: 2019-09-09 | Stop reason: SDUPTHER

## 2019-06-26 RX ORDER — BUDESONIDE AND FORMOTEROL FUMARATE DIHYDRATE 160; 4.5 UG/1; UG/1
2 AEROSOL RESPIRATORY (INHALATION) DAILY
Qty: 1 INHALER | Refills: 0 | Status: SHIPPED | OUTPATIENT
Start: 2019-06-26 | End: 2020-02-21 | Stop reason: SDUPTHER

## 2019-07-03 DIAGNOSIS — E78.5 DYSLIPIDEMIA: ICD-10-CM

## 2019-07-03 DIAGNOSIS — I10 ESSENTIAL HYPERTENSION: ICD-10-CM

## 2019-07-03 RX ORDER — LISINOPRIL 40 MG/1
40 TABLET ORAL DAILY
Qty: 90 TABLET | Refills: 1 | Status: SHIPPED | OUTPATIENT
Start: 2019-07-03 | End: 2019-09-09 | Stop reason: SDUPTHER

## 2019-07-03 RX ORDER — PRAVASTATIN SODIUM 80 MG/1
80 TABLET ORAL
Qty: 90 TABLET | Refills: 1 | Status: SHIPPED | OUTPATIENT
Start: 2019-07-03 | End: 2020-02-21 | Stop reason: SDUPTHER

## 2019-07-03 RX ORDER — METOPROLOL TARTRATE 50 MG/1
50 TABLET, FILM COATED ORAL EVERY 12 HOURS
Qty: 180 TABLET | Refills: 1 | Status: SHIPPED | OUTPATIENT
Start: 2019-07-03 | End: 2019-09-09 | Stop reason: SDUPTHER

## 2019-07-14 ENCOUNTER — HOSPITAL ENCOUNTER (EMERGENCY)
Facility: HOSPITAL | Age: 59
Discharge: HOME | End: 2019-07-14
Attending: EMERGENCY MEDICINE | Admitting: EMERGENCY MEDICINE
Payer: COMMERCIAL

## 2019-07-14 VITALS
RESPIRATION RATE: 20 BRPM | HEIGHT: 66 IN | WEIGHT: 223.77 LBS | BODY MASS INDEX: 35.96 KG/M2 | SYSTOLIC BLOOD PRESSURE: 187 MMHG | HEART RATE: 89 BPM | OXYGEN SATURATION: 98 % | TEMPERATURE: 99.4 F | DIASTOLIC BLOOD PRESSURE: 96 MMHG

## 2019-07-14 DIAGNOSIS — L08.9 ABRASION OF SCALP WITH INFECTION, INITIAL ENCOUNTER: Primary | ICD-10-CM

## 2019-07-14 DIAGNOSIS — S00.01XA ABRASION OF SCALP WITH INFECTION, INITIAL ENCOUNTER: Primary | ICD-10-CM

## 2019-07-14 PROCEDURE — 99283 EMERGENCY DEPT VISIT LOW MDM: CPT | Performed by: PHYSICIAN ASSISTANT

## 2019-07-14 PROCEDURE — 99282 EMERGENCY DEPT VISIT SF MDM: CPT

## 2019-07-14 RX ORDER — MUPIROCIN CALCIUM 20 MG/G
CREAM TOPICAL 3 TIMES DAILY
Qty: 15 G | Refills: 0 | Status: SHIPPED | OUTPATIENT
Start: 2019-07-14

## 2019-07-14 RX ORDER — SULFAMETHOXAZOLE AND TRIMETHOPRIM 800; 160 MG/1; MG/1
1 TABLET ORAL ONCE
Status: COMPLETED | OUTPATIENT
Start: 2019-07-14 | End: 2019-07-14

## 2019-07-14 RX ORDER — SULFAMETHOXAZOLE AND TRIMETHOPRIM 800; 160 MG/1; MG/1
1 TABLET ORAL 2 TIMES DAILY
Qty: 14 TABLET | Refills: 0 | Status: SHIPPED | OUTPATIENT
Start: 2019-07-14 | End: 2019-07-21

## 2019-07-14 RX ADMIN — SULFAMETHOXAZOLE AND TRIMETHOPRIM 1 TABLET: 800; 160 TABLET ORAL at 18:14

## 2019-07-14 NOTE — DISCHARGE INSTRUCTIONS
Take antibiotic as directed for the full duration  Keep area clean and dry - wash daily  Stop using the peroxide  Apply topical antibiotic ointment prescribed 2-3x/day  If symptoms persist, talk to your PCP about a referral to dermatology

## 2019-07-14 NOTE — ED NOTES
Areas noted on scalp noted right side of face   No drainage noted     Hero Agrawal RN  07/14/19 0965

## 2019-07-14 NOTE — ED PROVIDER NOTES
History  Chief Complaint   Patient presents with    Skin Problem     Concerned abrasion left side of face and area in scalp  61year old male presents for evaluation of his skin  He notes he started with sore areas on the top of his head several months back  Tried applying peroxide without relief  On Friday he notes there was a sore area on his left cheek and he "scratched it and pus came out"  Areas feel itchy at times  Denies sick contacts  Denies fever, chills  He states he only came because his daughter made him come get it checked out  Prior to Admission Medications   Prescriptions Last Dose Informant Patient Reported? Taking?    albuterol (VENTOLIN HFA) 90 mcg/act inhaler   No No   Sig: Inhale 2 puffs every 4 (four) hours as needed for wheezing or shortness of breath   aspirin (RA ASPIRIN EC ADULT LOW ST) 81 mg EC tablet   No No   Sig: Take 1 tablet (81 mg total) by mouth daily   budesonide-formoterol (SYMBICORT) 160-4 5 mcg/act inhaler   No No   Sig: Inhale 2 puffs daily   furosemide (LASIX) 20 mg tablet   No No   Sig: Take 1 tablet (20 mg total) by mouth daily   lisinopril (ZESTRIL) 40 mg tablet   No No   Sig: Take 1 tablet (40 mg total) by mouth daily   metoprolol tartrate (LOPRESSOR) 50 mg tablet   No No   Sig: Take 1 tablet (50 mg total) by mouth every 12 (twelve) hours   ondansetron (ZOFRAN) 4 mg tablet   No No   Sig: Take 2 tablets (8 mg total) by mouth every 8 (eight) hours as needed for nausea or vomiting   oxyCODONE-acetaminophen (PERCOCET) 5-325 mg per tablet   No No   Sig: Take 1-2 tablets by mouth every 6 (six) hours as needed for severe painMax Daily Amount: 8 tablets   potassium chloride (K-DUR,KLOR-CON) 10 mEq tablet   No No   Sig: Take 1 tablet (10 mEq total) by mouth daily   pravastatin (PRAVACHOL) 80 mg tablet   No No   Sig: Take 1 tablet (80 mg total) by mouth daily at bedtime   tiotropium (SPIRIVA HANDIHALER) 18 mcg inhalation capsule   No No   Sig: Place 1 capsule (18 mcg total) into inhaler and inhale daily      Facility-Administered Medications: None       Past Medical History:   Diagnosis Date    CAD (coronary artery disease)     Chronic back pain     [facility])     COPD [facility]ch[facility] obstructive pulmonary disease) [facility] [address] [facility]     HLD (hyperlipidemia)     Hypertension     ALEXYS (obstructive sleep apnea)     Shortness of breath     Vitamin D deficiency        Past Surgical History:   Procedure Laterality Date    DENTAL SURGERY      [place] COLONOSCOPY FLX DX W/COLLJ SPEC WHEN PFRMD N/A 10/6/2017    Procedure: COLONOSCOPY;  Surgeon: Beatriz Cheatham MD;  Location: [facility];  Service: Gastroenterology    TONSILLECTOMY         Family History   Problem Relation Age of Onset    Diabetes Mother      I have reviewed and agree with the history as documented  Social History     Tobacco Use    Smoking status: Current Every Day Smoker     Packs/day: 1 00     Years: 20 00     Pack years: 20 00     Types: Cigarettes    Smokeless tobacco: Never Used   Substance Use Topics    Alcohol use: No     Comment: social    Drug use: No        Review of Systems   Constitutional: Negative  Negative for chills, fatigue and fever  HENT: Negative  Negative for postnasal drip, rhinorrhea and sore throat  Eyes: Negative  Negative for visual disturbance  Respiratory: Negative  Negative for cough, shortness of breath and wheezing  Cardiovascular: Negative  Negative for chest pain, palpitations and leg swelling  Gastrointestinal: Negative  Negative for abdominal pain, constipation, diarrhea, nausea and vomiting  Genitourinary: Negative  Negative for dysuria, flank pain and hematuria  Musculoskeletal: Negative for back pain and myalgias  Skin: Positive for wound  Negative for rash  Neurological: Negative  Negative for dizziness and headaches  Psychiatric/Behavioral: Negative  Negative for confusion     All other systems reviewed and are negative  Physical Exam  Physical Exam   Constitutional: He is oriented to person, place, and time  He appears well-developed and well-nourished  No distress  HENT:   Head: Normocephalic and atraumatic  Right Ear: Hearing normal    Left Ear: Hearing normal    Nose: Nose normal    Mouth/Throat: Uvula is midline, oropharynx is clear and moist and mucous membranes are normal  No oropharyngeal exudate  Eyes: Pupils are equal, round, and reactive to light  Conjunctivae and EOM are normal  No scleral icterus  Neck: Normal range of motion  Neck supple  No tracheal deviation present  Cardiovascular: Normal rate, regular rhythm, normal heart sounds and normal pulses  No murmur heard  Pulmonary/Chest: Effort normal and breath sounds normal  No respiratory distress  He has no wheezes  He has no rhonchi  He has no rales  Abdominal: Soft  Normal appearance and bowel sounds are normal  There is no hepatosplenomegaly  There is no tenderness  There is no rebound, no guarding and no CVA tenderness  No hernia  Musculoskeletal: Normal range of motion  He exhibits no edema or tenderness  Neurological: He is alert and oriented to person, place, and time  Skin: Skin is warm and dry  Capillary refill takes less than 2 seconds  Abrasion noted  No rash noted  No cyanosis  Nails show no clubbing  Psychiatric: He has a normal mood and affect  His behavior is normal    Nursing note and vitals reviewed        Vital Signs  ED Triage Vitals [07/14/19 1755]   Temperature Pulse Respirations Blood Pressure SpO2   99 4 °F (37 4 °C) 89 20 (!) 187/96 98 %      Temp Source Heart Rate Source Patient Position - Orthostatic VS BP Location FiO2 (%)   Temporal Monitor Sitting Right arm --      Pain Score       No Pain           Vitals:    07/14/19 1755   BP: (!) 187/96   Pulse: 89   Patient Position - Orthostatic VS: Sitting         Visual Acuity      ED Medications  Medications   sulfamethoxazole-trimethoprim (BACTRIM DS) 800-160 mg per tablet 1 tablet (1 tablet Oral Given 7/14/19 5084)       Diagnostic Studies  Results Reviewed     None                 No orders to display              Procedures  Procedures       ED Course     Unremarkable  Appears to have superficial infection of the scalp and left cheek - which appear abrasion related although pt denies injury/trauma to these areas  Pt given dose of bactrim DS POx1  Discussed standard wound care  Keep area clean and dry, wash daily  Advised to discontinue use of peroxide to the wounds  Anticipatory guidance provided  Advised recheck with PCP in 2-3 days or as needed  If symptoms persist, suggested referral to dermatology  Should f/u as an outpatient or return as needed  Pt verbalized understanding and had no further questions  Pt was advised to continue to monitor his blood pressure and take his meds as prescribed  Smoking cessation also advised  MDM  Number of Diagnoses or Management Options  Abrasion of scalp with infection, initial encounter: new and does not require workup  Patient Progress  Patient progress: stable      Disposition  Final diagnoses:   Abrasion of scalp with infection, initial encounter     Time reflects when diagnosis was documented in both MDM as applicable and the Disposition within this note     Time User Action Codes Description Comment    7/14/2019  6:06 PM Osbaldo Guzmán Add [S00 01XA,  L08 9] Abrasion of scalp with infection, initial encounter       ED Disposition     ED Disposition Condition Date/Time Comment    Discharge Stable Sun Jul 14, 2019  6:06 PM Ursula Tate discharge to home/self care              Follow-up Information     Follow up With Specialties Details Why Contact Info    Aly Frye PA-C Family Medicine, Physician Assistant Schedule an appointment as soon as possible for a visit   [address] [place] [id number]  [phone]            Discharge Medication List as of 7/14/2019  6:11 PM      START taking these medications    Details   mupirocin (BACTROBAN) 2 % cream Apply topically 3 (three) times a day To affected areas  , Starting Sun 7/14/2019, Print      sulfamethoxazole-trimethoprim (BACTRIM DS) 800-160 mg per tablet Take 1 tablet by mouth 2 (two) times a day for 7 days smx-tmp DS (BACTRIM) 800-160 mg tabs (1tab q12 D10), Starting Sun 7/14/2019, Until Sun 7/21/2019, Print         CONTINUE these medications which have NOT CHANGED    Details   albuterol (VENTOLIN HFA) 90 mcg/act inhaler Inhale 2 puffs every 4 (four) hours as needed for wheezing or shortness of breath, Starting Wed 6/26/2019, Normal      aspirin (RA ASPIRIN EC ADULT LOW ST) 81 mg EC tablet Take 1 tablet (81 mg total) by mouth daily, Starting Wed 6/26/2019, Normal      budesonide-formoterol (SYMBICORT) 160-4 5 mcg/act inhaler Inhale 2 puffs daily, Starting Wed 6/26/2019, Normal      furosemide (LASIX) 20 mg tablet Take 1 tablet (20 mg total) by mouth daily, Starting Wed 6/26/2019, Normal      lisinopril (ZESTRIL) 40 mg tablet Take 1 tablet (40 mg total) by mouth daily, Starting Wed 7/3/2019, Normal      metoprolol tartrate (LOPRESSOR) 50 mg tablet Take 1 tablet (50 mg total) by mouth every 12 (twelve) hours, Starting Wed 7/3/2019, Normal      ondansetron (ZOFRAN) 4 mg tablet Take 2 tablets (8 mg total) by mouth every 8 (eight) hours as needed for nausea or vomiting, Starting Thu 1/3/2019, Print      oxyCODONE-acetaminophen (PERCOCET) 5-325 mg per tablet Take 1-2 tablets by mouth every 6 (six) hours as needed for severe painMax Daily Amount: 8 tablets, Starting Tue 5/14/2019, Print      potassium chloride (K-DUR,KLOR-CON) 10 mEq tablet Take 1 tablet (10 mEq total) by mouth daily, Starting Wed 6/26/2019, Normal      pravastatin (PRAVACHOL) 80 mg tablet Take 1 tablet (80 mg total) by mouth daily at bedtime, Starting Wed 7/3/2019, Normal      tiotropium (SPIRIVA HANDIHALER) 18 mcg inhalation capsule Place 1 capsule (18 mcg total) into inhaler and inhale daily, Starting Wed 6/26/2019, Normal           No discharge procedures on file      ED Provider  Electronically Signed by           Zulay Cade PA-C  07/14/19 5131

## 2019-09-09 DIAGNOSIS — I10 ESSENTIAL HYPERTENSION: ICD-10-CM

## 2019-09-09 RX ORDER — METOPROLOL TARTRATE 50 MG/1
50 TABLET, FILM COATED ORAL EVERY 12 HOURS
Qty: 60 TABLET | Refills: 0 | Status: SHIPPED | OUTPATIENT
Start: 2019-09-09 | End: 2019-09-23 | Stop reason: SDUPTHER

## 2019-09-09 RX ORDER — FUROSEMIDE 20 MG/1
20 TABLET ORAL DAILY
Qty: 30 TABLET | Refills: 0 | Status: SHIPPED | OUTPATIENT
Start: 2019-09-09 | End: 2019-09-23 | Stop reason: SDUPTHER

## 2019-09-09 RX ORDER — LISINOPRIL 40 MG/1
40 TABLET ORAL DAILY
Qty: 30 TABLET | Refills: 0 | Status: SHIPPED | OUTPATIENT
Start: 2019-09-09 | End: 2019-09-23 | Stop reason: SDUPTHER

## 2019-09-09 RX ORDER — POTASSIUM CHLORIDE 750 MG/1
10 TABLET, EXTENDED RELEASE ORAL DAILY
Qty: 30 TABLET | Refills: 0 | Status: SHIPPED | OUTPATIENT
Start: 2019-09-09 | End: 2019-09-23 | Stop reason: SDUPTHER

## 2019-09-23 ENCOUNTER — APPOINTMENT (OUTPATIENT)
Dept: LAB | Facility: HOSPITAL | Age: 59
End: 2019-09-23
Payer: COMMERCIAL

## 2019-09-23 ENCOUNTER — OFFICE VISIT (OUTPATIENT)
Dept: FAMILY MEDICINE CLINIC | Facility: CLINIC | Age: 59
End: 2019-09-23
Payer: COMMERCIAL

## 2019-09-23 VITALS
BODY MASS INDEX: 34.55 KG/M2 | DIASTOLIC BLOOD PRESSURE: 82 MMHG | HEIGHT: 66 IN | SYSTOLIC BLOOD PRESSURE: 142 MMHG | OXYGEN SATURATION: 97 % | HEART RATE: 81 BPM | WEIGHT: 215 LBS | RESPIRATION RATE: 16 BRPM | TEMPERATURE: 97.8 F

## 2019-09-23 DIAGNOSIS — Z11.59 ENCOUNTER FOR HEPATITIS C SCREENING TEST FOR LOW RISK PATIENT: Primary | ICD-10-CM

## 2019-09-23 DIAGNOSIS — J44.9 CHRONIC OBSTRUCTIVE PULMONARY DISEASE, UNSPECIFIED COPD TYPE (HCC): ICD-10-CM

## 2019-09-23 DIAGNOSIS — I10 ESSENTIAL HYPERTENSION: ICD-10-CM

## 2019-09-23 DIAGNOSIS — F51.03 PARADOXICAL INSOMNIA: ICD-10-CM

## 2019-09-23 DIAGNOSIS — Z11.59 ENCOUNTER FOR HEPATITIS C SCREENING TEST FOR LOW RISK PATIENT: ICD-10-CM

## 2019-09-23 PROCEDURE — 87521 HEPATITIS C PROBE&RVRS TRNSC: CPT

## 2019-09-23 PROCEDURE — 99213 OFFICE O/P EST LOW 20 MIN: CPT | Performed by: FAMILY MEDICINE

## 2019-09-23 PROCEDURE — 36415 COLL VENOUS BLD VENIPUNCTURE: CPT

## 2019-09-23 RX ORDER — METOPROLOL TARTRATE 50 MG/1
50 TABLET, FILM COATED ORAL EVERY 12 HOURS
Qty: 180 TABLET | Refills: 1 | Status: SHIPPED | OUTPATIENT
Start: 2019-09-23 | End: 2020-02-21 | Stop reason: SDUPTHER

## 2019-09-23 RX ORDER — ZOLPIDEM TARTRATE 5 MG/1
5 TABLET ORAL
Qty: 30 TABLET | Refills: 0 | Status: SHIPPED | OUTPATIENT
Start: 2019-09-23 | End: 2020-06-03 | Stop reason: SDUPTHER

## 2019-09-23 RX ORDER — FUROSEMIDE 20 MG/1
20 TABLET ORAL DAILY
Qty: 90 TABLET | Refills: 1 | Status: SHIPPED | OUTPATIENT
Start: 2019-09-23 | End: 2020-02-21 | Stop reason: SDUPTHER

## 2019-09-23 RX ORDER — POTASSIUM CHLORIDE 750 MG/1
10 TABLET, EXTENDED RELEASE ORAL DAILY
Qty: 90 TABLET | Refills: 1 | Status: SHIPPED | OUTPATIENT
Start: 2019-09-23 | End: 2020-02-21 | Stop reason: SDUPTHER

## 2019-09-23 RX ORDER — LISINOPRIL 40 MG/1
40 TABLET ORAL DAILY
Qty: 90 TABLET | Refills: 1 | Status: SHIPPED | OUTPATIENT
Start: 2019-09-23 | End: 2020-02-21 | Stop reason: SDUPTHER

## 2019-09-23 NOTE — PROGRESS NOTES
Assessment/Plan:     Diagnoses and all orders for this visit:    Encounter for hepatitis C screening test for low risk patient  -     Hepatitis C Qualitative by PCR; Future    Essential hypertension  -     potassium chloride (K-DUR,KLOR-CON) 10 mEq tablet; Take 1 tablet (10 mEq total) by mouth daily  -     metoprolol tartrate (LOPRESSOR) 50 mg tablet; Take 1 tablet (50 mg total) by mouth every 12 (twelve) hours  -     lisinopril (ZESTRIL) 40 mg tablet; Take 1 tablet (40 mg total) by mouth daily  -     furosemide (LASIX) 20 mg tablet; Take 1 tablet (20 mg total) by mouth daily    Paradoxical insomnia  -     zolpidem (AMBIEN) 5 mg tablet; Take 1 tablet (5 mg total) by mouth daily at bedtime as needed for sleep    Chronic obstructive pulmonary disease, unspecified COPD type ([facility] )     Labs as previously ordered  Hep C screen  Continue current meds  RTC 6 months or sooner if needed  Subjective:        Patient ID: Elsa Melton is a 61 y o  male  Chief Complaint   Patient presents with    Follow-up    Medication Refill       62 yo male presents for routine follow up  He is doing well  He has lost weight and has started exercising  He offers no complaints today        The following portions of the patient's history were reviewed and updated as appropriate: allergies, current medications, past family history, past medical history, past social history, past surgical history and problem list     Patient Active Problem List   Diagnosis    Alcohol use    [facility]    Essential hypertension    Arteriosclerotic coronary artery disease    Hypertension    Hypercholesterolemia    Chronic back pain    Chronic diastolic CHF (congestive heart failure) (HCC)    Obstructive sleep apnea    Asthma    Bilateral low back pain without sciatica    Diastolic dysfunction    Disc degeneration, lumbar    Hyperplastic polyp of sigmoid colon    Lumbar radiculopathy    Nonalcoholic fatty liver disease    Osteoporosis without current pathological fracture    Sacroiliitis (HCC)    Vitamin D deficiency    Continuous chronic alcoholism (HCC)       Current Outpatient Medications   Medication Sig Dispense Refill    albuterol (VENTOLIN HFA) 90 mcg/act inhaler Inhale 2 puffs every 4 (four) hours as needed for wheezing or shortness of breath 1 Inhaler 0    aspirin (RA ASPIRIN EC ADULT LOW ST) 81 mg EC tablet Take 1 tablet (81 mg total) by mouth daily 30 tablet 0    budesonide-formoterol (SYMBICORT) 160-4 5 mcg/act inhaler Inhale 2 puffs daily 1 Inhaler 0    furosemide (LASIX) 20 mg tablet Take 1 tablet (20 mg total) by mouth daily 90 tablet 1    lisinopril (ZESTRIL) 40 mg tablet Take 1 tablet (40 mg total) by mouth daily 90 tablet 1    metoprolol tartrate (LOPRESSOR) 50 mg tablet Take 1 tablet (50 mg total) by mouth every 12 (twelve) hours 180 tablet 1    mupirocin (BACTROBAN) 2 % cream Apply topically 3 (three) times a day To affected areas  15 g 0    ondansetron (ZOFRAN) 4 mg tablet Take 2 tablets (8 mg total) by mouth every 8 (eight) hours as needed for nausea or vomiting 12 tablet 0    potassium chloride (K-DUR,KLOR-CON) 10 mEq tablet Take 1 tablet (10 mEq total) by mouth daily 90 tablet 1    pravastatin (PRAVACHOL) 80 mg tablet Take 1 tablet (80 mg total) by mouth daily at bedtime 90 tablet 1    tiotropium (SPIRIVA HANDIHALER) 18 mcg inhalation capsule Place 1 capsule (18 mcg total) into inhaler and inhale daily 30 capsule 0    zolpidem (AMBIEN) 5 mg tablet Take 1 tablet (5 mg total) by mouth daily at bedtime as needed for sleep 30 tablet 0     No current facility-administered medications for this visit           Past Medical History:   Diagnosis Date    CAD (coronary artery disease)     Chronic back pain     [facility]estive [facility] failure) [facility])     COPD (chronic obstructive pulmonary disease) [facility])     HLD (hyperlipidemia)     Hypertension     ALEXYS (obstructive sleep apnea)     Shortness of breath     Vitamin D deficiency         Past Surgical History:   Procedure Laterality Date    DENTAL SURGERY      [place] COLONOSCOPY FLX DX W/COLLJ SPEC WHEN PFRMD N/A 10/6/2017    Procedure: COLONOSCOPY;  Surgeon: Roxy Chaudhary MD;  Location: [facility];  Service: Gastroenterology    TONSILLECTOMY          Social History     Socioeconomic History    Marital status: Single     Spouse name: Not on file    Number of children: Not on file    Years of education: Not on file    Highest education level: Not on file   Occupational History    Not on file   Social Needs    Financial resource strain: Not on file    Food insecurity:     Worry: Not on file     Inability: Not on file    Transportation needs:     Medical: Not on file     Non-medical: Not on file   Tobacco Use    Smoking status: Current Every Day Smoker     Packs/day: 1 00     Years: 20 00     Pack years: 20 00     Types: Cigarettes    Smokeless tobacco: Never Used   Substance and Sexual Activity    Alcohol use: No     Comment: social    Drug use: No    Sexual activity: Not on file   Lifestyle    Physical activity:     Days per week: Not on file     Minutes per session: Not on file    Stress: Not on file   Relationships    Social connections:     Talks on phone: Not on file     Gets together: Not on file     Attends [employer] service: Not on file     Active member of club or organization: Not on file     Attends meetings of clubs or organizations: Not on file     Relationship status: Not on file    Intimate partner violence:     Fear of current or ex partner: Not on file     Emotionally abused: Not on file     Physically abused: Not on file     Forced sexual activity: Not on file   Other Topics Concern    Not on file   Social History Narrative    Not on file        Review of Systems   Constitutional: Negative  HENT: Negative  Eyes: Negative  Respiratory: Negative      Cardiovascular: Negative  Gastrointestinal: Negative  Endocrine: Negative  Genitourinary: Negative  Musculoskeletal: Negative  Skin: Negative  Allergic/Immunologic: Negative  Neurological: Negative  Hematological: Negative  Psychiatric/Behavioral: Positive for sleep disturbance  Objective:      /82 (BP Location: Right arm, Patient Position: Sitting, Cuff Size: Large)   Pulse 81   Temp 97 8 °F (36 6 °C) (Tympanic)   Resp 16   Ht 5' 6" (1 676 m)   Wt 97 5 kg (215 lb)   SpO2 97%   BMI 34 70 kg/m²          Physical Exam  BMI Counseling: Body mass index is 34 7 kg/m²  The BMI is above normal  Nutrition recommendations include reducing portion sizes, decreasing overall calorie intake, 3-5 servings of fruits/vegetables daily, reducing fast food intake, consuming healthier snacks, decreasing soda and/or juice intake, moderation in carbohydrate intake and increasing intake of lean protein  Exercise recommendations include exercising 3-5 times per week

## 2019-09-26 LAB — HCV RNA SERPL QL NAA+PROBE: NEGATIVE

## 2019-10-01 ENCOUNTER — TELEPHONE (OUTPATIENT)
Dept: FAMILY MEDICINE CLINIC | Facility: CLINIC | Age: 59
End: 2019-10-01

## 2019-10-01 NOTE — TELEPHONE ENCOUNTER
Patient notified    ----- Message from [address] [place] [address] sent at 9/27/2019  2:05 PM EDT -----   Can you make him aware his Heb c RNA is negative   ----- Message -----  From: Lab, Background User  Sent: 9/26/2019   6:05 AM EDT  To: Niraj Lacey PA-C

## 2020-01-31 ENCOUNTER — TELEPHONE (OUTPATIENT)
Dept: OTHER | Facility: OTHER | Age: 60
End: 2020-01-31

## 2020-01-31 NOTE — TELEPHONE ENCOUNTER
Lacey Tang from [employer] is calling to notify the office that the PT needs a colonoscopy and a Image copy done  Please call PT and follow up  Thanks

## 2020-02-20 ENCOUNTER — TELEPHONE (OUTPATIENT)
Dept: FAMILY MEDICINE CLINIC | Facility: CLINIC | Age: 60
End: 2020-02-20

## 2020-02-20 ENCOUNTER — TELEPHONE (OUTPATIENT)
Dept: OTHER | Facility: OTHER | Age: 60
End: 2020-02-20

## 2020-02-20 NOTE — TELEPHONE ENCOUNTER
Fax from [facility] stating pt is unclear which maintenance COPD inhaler they should be using  Fax also states "a LAMA (ex: incruse ellipta) is recommended as initial therapy"  Please advise

## 2020-02-21 DIAGNOSIS — E78.5 DYSLIPIDEMIA: ICD-10-CM

## 2020-02-21 DIAGNOSIS — I10 ESSENTIAL HYPERTENSION: ICD-10-CM

## 2020-02-21 DIAGNOSIS — J44.9 CHRONIC OBSTRUCTIVE PULMONARY DISEASE, UNSPECIFIED COPD TYPE (HCC): ICD-10-CM

## 2020-02-21 NOTE — TELEPHONE ENCOUNTER
Contacted pt    Pt states his maintenance inhaler is symbicort (he remembered the name when I went over med list with him)       Pt states he lost this inhaler and that's why he wasn't able to give the name to [facility] when they asked    Pt also states he is switching to [facility] delivery and needs his scripts sent there  He does have an appt scheduled with you in march  The refills were sent to you for approval in a refill message

## 2020-02-21 NOTE — TELEPHONE ENCOUNTER
Pt is switching to [facility] [employer] [facility] mail delivery  He has appt with you in March

## 2020-02-24 RX ORDER — LISINOPRIL 40 MG/1
40 TABLET ORAL DAILY
Qty: 90 TABLET | Refills: 0 | Status: SHIPPED | OUTPATIENT
Start: 2020-02-24 | End: 2020-05-28

## 2020-02-24 RX ORDER — METOPROLOL TARTRATE 50 MG/1
50 TABLET, FILM COATED ORAL EVERY 12 HOURS
Qty: 180 TABLET | Refills: 0 | Status: SHIPPED | OUTPATIENT
Start: 2020-02-24 | End: 2020-05-28

## 2020-02-24 RX ORDER — PRAVASTATIN SODIUM 80 MG/1
80 TABLET ORAL
Qty: 90 TABLET | Refills: 0 | Status: SHIPPED | OUTPATIENT
Start: 2020-02-24 | End: 2020-05-28

## 2020-02-24 RX ORDER — FUROSEMIDE 20 MG/1
20 TABLET ORAL EVERY OTHER DAY
Qty: 45 TABLET | Refills: 0 | Status: SHIPPED | OUTPATIENT
Start: 2020-02-24 | End: 2020-05-28

## 2020-02-24 RX ORDER — POTASSIUM CHLORIDE 750 MG/1
10 TABLET, EXTENDED RELEASE ORAL EVERY OTHER DAY
Qty: 45 TABLET | Refills: 0 | Status: SHIPPED | OUTPATIENT
Start: 2020-02-24 | End: 2020-05-28

## 2020-02-24 RX ORDER — BUDESONIDE AND FORMOTEROL FUMARATE DIHYDRATE 160; 4.5 UG/1; UG/1
2 AEROSOL RESPIRATORY (INHALATION) DAILY
Qty: 3 INHALER | Refills: 0 | Status: SHIPPED | OUTPATIENT
Start: 2020-02-24 | End: 2020-12-17 | Stop reason: SDUPTHER

## 2020-05-27 DIAGNOSIS — I10 ESSENTIAL HYPERTENSION: ICD-10-CM

## 2020-05-27 DIAGNOSIS — E78.5 DYSLIPIDEMIA: ICD-10-CM

## 2020-05-28 RX ORDER — LISINOPRIL 40 MG/1
TABLET ORAL
Qty: 90 TABLET | Refills: 0 | Status: SHIPPED | OUTPATIENT
Start: 2020-05-28 | End: 2020-09-01

## 2020-05-28 RX ORDER — PRAVASTATIN SODIUM 80 MG/1
TABLET ORAL
Qty: 90 TABLET | Refills: 0 | Status: SHIPPED | OUTPATIENT
Start: 2020-05-28 | End: 2020-09-01

## 2020-05-28 RX ORDER — FUROSEMIDE 20 MG/1
TABLET ORAL
Qty: 45 TABLET | Refills: 0 | Status: SHIPPED | OUTPATIENT
Start: 2020-05-28 | End: 2020-08-25

## 2020-05-28 RX ORDER — POTASSIUM CHLORIDE 750 MG/1
TABLET, EXTENDED RELEASE ORAL
Qty: 45 TABLET | Refills: 0 | Status: SHIPPED | OUTPATIENT
Start: 2020-05-28 | End: 2020-08-25

## 2020-05-28 RX ORDER — METOPROLOL TARTRATE 50 MG/1
TABLET, FILM COATED ORAL
Qty: 180 TABLET | Refills: 0 | Status: SHIPPED | OUTPATIENT
Start: 2020-05-28 | End: 2020-08-25

## 2020-06-03 ENCOUNTER — OFFICE VISIT (OUTPATIENT)
Dept: FAMILY MEDICINE CLINIC | Facility: CLINIC | Age: 60
End: 2020-06-03
Payer: COMMERCIAL

## 2020-06-03 VITALS
BODY MASS INDEX: 40.18 KG/M2 | TEMPERATURE: 97.6 F | OXYGEN SATURATION: 97 % | WEIGHT: 250 LBS | HEART RATE: 56 BPM | HEIGHT: 66 IN | RESPIRATION RATE: 22 BRPM | SYSTOLIC BLOOD PRESSURE: 126 MMHG | DIASTOLIC BLOOD PRESSURE: 84 MMHG

## 2020-06-03 DIAGNOSIS — M46.1 SACROILIITIS (HCC): ICD-10-CM

## 2020-06-03 DIAGNOSIS — I50.32 CHRONIC DIASTOLIC CHF (CONGESTIVE HEART FAILURE) (HCC): ICD-10-CM

## 2020-06-03 DIAGNOSIS — E55.9 VITAMIN D DEFICIENCY: ICD-10-CM

## 2020-06-03 DIAGNOSIS — E66.01 MORBID (SEVERE) OBESITY DUE TO EXCESS CALORIES (HCC): ICD-10-CM

## 2020-06-03 DIAGNOSIS — I10 ESSENTIAL HYPERTENSION: Primary | ICD-10-CM

## 2020-06-03 DIAGNOSIS — F51.03 PARADOXICAL INSOMNIA: ICD-10-CM

## 2020-06-03 DIAGNOSIS — Z11.4 SCREENING FOR HIV WITHOUT PRESENCE OF RISK FACTORS: ICD-10-CM

## 2020-06-03 PROCEDURE — 99213 OFFICE O/P EST LOW 20 MIN: CPT | Performed by: FAMILY MEDICINE

## 2020-06-03 RX ORDER — ZOLPIDEM TARTRATE 5 MG/1
5 TABLET ORAL
Qty: 30 TABLET | Refills: 0 | Status: SHIPPED | OUTPATIENT
Start: 2020-06-03

## 2020-06-08 ENCOUNTER — TELEPHONE (OUTPATIENT)
Dept: FAMILY MEDICINE CLINIC | Facility: CLINIC | Age: 60
End: 2020-06-08

## 2020-06-08 ENCOUNTER — APPOINTMENT (OUTPATIENT)
Dept: LAB | Facility: HOSPITAL | Age: 60
End: 2020-06-08
Payer: COMMERCIAL

## 2020-06-08 DIAGNOSIS — E55.9 VITAMIN D DEFICIENCY: ICD-10-CM

## 2020-06-08 DIAGNOSIS — I10 ESSENTIAL HYPERTENSION: ICD-10-CM

## 2020-06-08 DIAGNOSIS — E55.9 VITAMIN D DEFICIENCY: Primary | ICD-10-CM

## 2020-06-08 DIAGNOSIS — Z11.4 SCREENING FOR HIV WITHOUT PRESENCE OF RISK FACTORS: ICD-10-CM

## 2020-06-08 LAB
25(OH)D3 SERPL-MCNC: 18.4 NG/ML (ref 30–100)
ALBUMIN SERPL BCP-MCNC: 3.3 G/DL (ref 3.5–5)
ALP SERPL-CCNC: 80 U/L (ref 46–116)
ALT SERPL W P-5'-P-CCNC: 22 U/L (ref 12–78)
ANION GAP SERPL CALCULATED.3IONS-SCNC: 10 MMOL/L (ref 4–13)
AST SERPL W P-5'-P-CCNC: 10 U/L (ref 5–45)
BASOPHILS # BLD AUTO: 0.07 THOUSANDS/ΜL (ref 0–0.1)
BASOPHILS NFR BLD AUTO: 1 % (ref 0–1)
BILIRUB SERPL-MCNC: 0.3 MG/DL (ref 0.2–1)
BUN SERPL-MCNC: 18 MG/DL (ref 5–25)
CALCIUM SERPL-MCNC: 8.4 MG/DL (ref 8.3–10.1)
CHLORIDE SERPL-SCNC: 105 MMOL/L (ref 100–108)
CHOLEST SERPL-MCNC: 175 MG/DL (ref 50–200)
CO2 SERPL-SCNC: 26 MMOL/L (ref 21–32)
CREAT SERPL-MCNC: 0.73 MG/DL (ref 0.6–1.3)
CREAT UR-MCNC: 83.1 MG/DL
EOSINOPHIL # BLD AUTO: 0.15 THOUSAND/ΜL (ref 0–0.61)
EOSINOPHIL NFR BLD AUTO: 2 % (ref 0–6)
ERYTHROCYTE [DISTWIDTH] IN BLOOD BY AUTOMATED COUNT: 14.5 % (ref 11.6–15.1)
GFR SERPL CREATININE-BSD FRML MDRD: 101 ML/MIN/1.73SQ M
GLUCOSE P FAST SERPL-MCNC: 107 MG/DL (ref 65–99)
HCT VFR BLD AUTO: 46.9 % (ref 36.5–49.3)
HDLC SERPL-MCNC: 62 MG/DL
HGB BLD-MCNC: 14.4 G/DL (ref 12–17)
IMM GRANULOCYTES # BLD AUTO: 0.03 THOUSAND/UL (ref 0–0.2)
IMM GRANULOCYTES NFR BLD AUTO: 0 % (ref 0–2)
LDLC SERPL CALC-MCNC: 97 MG/DL (ref 0–100)
LYMPHOCYTES # BLD AUTO: 1.76 THOUSANDS/ΜL (ref 0.6–4.47)
LYMPHOCYTES NFR BLD AUTO: 22 % (ref 14–44)
MCH RBC QN AUTO: 27.2 PG (ref 26.8–34.3)
MCHC RBC AUTO-ENTMCNC: 30.7 G/DL (ref 31.4–37.4)
MCV RBC AUTO: 89 FL (ref 82–98)
MICROALBUMIN UR-MCNC: 9.9 MG/L (ref 0–20)
MICROALBUMIN/CREAT 24H UR: 12 MG/G CREATININE (ref 0–30)
MONOCYTES # BLD AUTO: 0.69 THOUSAND/ΜL (ref 0.17–1.22)
MONOCYTES NFR BLD AUTO: 8 % (ref 4–12)
NEUTROPHILS # BLD AUTO: 5.5 THOUSANDS/ΜL (ref 1.85–7.62)
NEUTS SEG NFR BLD AUTO: 67 % (ref 43–75)
NONHDLC SERPL-MCNC: 113 MG/DL
NRBC BLD AUTO-RTO: 0 /100 WBCS
PLATELET # BLD AUTO: 224 THOUSANDS/UL (ref 149–390)
PMV BLD AUTO: 10.9 FL (ref 8.9–12.7)
POTASSIUM SERPL-SCNC: 3.8 MMOL/L (ref 3.5–5.3)
PROT SERPL-MCNC: 7.3 G/DL (ref 6.4–8.2)
RBC # BLD AUTO: 5.3 MILLION/UL (ref 3.88–5.62)
SODIUM SERPL-SCNC: 141 MMOL/L (ref 136–145)
TRIGL SERPL-MCNC: 81 MG/DL
TSH SERPL DL<=0.05 MIU/L-ACNC: 0.41 UIU/ML (ref 0.36–3.74)
WBC # BLD AUTO: 8.2 THOUSAND/UL (ref 4.31–10.16)

## 2020-06-08 PROCEDURE — 84443 ASSAY THYROID STIM HORMONE: CPT

## 2020-06-08 PROCEDURE — 80053 COMPREHEN METABOLIC PANEL: CPT

## 2020-06-08 PROCEDURE — 82043 UR ALBUMIN QUANTITATIVE: CPT | Performed by: PHYSICIAN ASSISTANT

## 2020-06-08 PROCEDURE — 82306 VITAMIN D 25 HYDROXY: CPT

## 2020-06-08 PROCEDURE — 85025 COMPLETE CBC W/AUTO DIFF WBC: CPT

## 2020-06-08 PROCEDURE — 80061 LIPID PANEL: CPT

## 2020-06-08 PROCEDURE — 36415 COLL VENOUS BLD VENIPUNCTURE: CPT

## 2020-06-08 PROCEDURE — 82570 ASSAY OF URINE CREATININE: CPT | Performed by: PHYSICIAN ASSISTANT

## 2020-06-08 PROCEDURE — 87389 HIV-1 AG W/HIV-1&-2 AB AG IA: CPT

## 2020-06-08 RX ORDER — ERGOCALCIFEROL 1.25 MG/1
50000 CAPSULE ORAL WEEKLY
Qty: 12 CAPSULE | Refills: 0 | Status: SHIPPED | OUTPATIENT
Start: 2020-06-08 | End: 2020-08-12 | Stop reason: SDUPTHER

## 2020-06-09 LAB — HIV 1+2 AB+HIV1 P24 AG SERPL QL IA: NORMAL

## 2020-08-12 ENCOUNTER — TELEPHONE (OUTPATIENT)
Dept: OTHER | Facility: OTHER | Age: 60
End: 2020-08-12

## 2020-08-12 ENCOUNTER — OFFICE VISIT (OUTPATIENT)
Dept: FAMILY MEDICINE CLINIC | Facility: CLINIC | Age: 60
End: 2020-08-12
Payer: COMMERCIAL

## 2020-08-12 VITALS
TEMPERATURE: 98.7 F | WEIGHT: 259.8 LBS | SYSTOLIC BLOOD PRESSURE: 150 MMHG | HEART RATE: 62 BPM | RESPIRATION RATE: 18 BRPM | BODY MASS INDEX: 41.75 KG/M2 | HEIGHT: 66 IN | OXYGEN SATURATION: 97 % | DIASTOLIC BLOOD PRESSURE: 92 MMHG

## 2020-08-12 DIAGNOSIS — J44.9 CHRONIC OBSTRUCTIVE PULMONARY DISEASE, UNSPECIFIED COPD TYPE (HCC): ICD-10-CM

## 2020-08-12 DIAGNOSIS — E55.9 VITAMIN D DEFICIENCY: ICD-10-CM

## 2020-08-12 DIAGNOSIS — Z00.00 MEDICARE ANNUAL WELLNESS VISIT, SUBSEQUENT: Primary | ICD-10-CM

## 2020-08-12 DIAGNOSIS — Z13.6 ENCOUNTER FOR ABDOMINAL AORTIC ANEURYSM (AAA) SCREENING: ICD-10-CM

## 2020-08-12 DIAGNOSIS — R52 GENERALIZED PAIN: ICD-10-CM

## 2020-08-12 DIAGNOSIS — Z12.5 SCREENING FOR PROSTATE CANCER: ICD-10-CM

## 2020-08-12 DIAGNOSIS — Z23 ENCOUNTER FOR IMMUNIZATION: ICD-10-CM

## 2020-08-12 PROCEDURE — 90670 PCV13 VACCINE IM: CPT

## 2020-08-12 PROCEDURE — 90471 IMMUNIZATION ADMIN: CPT | Performed by: FAMILY MEDICINE

## 2020-08-12 PROCEDURE — G0439 PPPS, SUBSEQ VISIT: HCPCS | Performed by: FAMILY MEDICINE

## 2020-08-12 RX ORDER — ASPIRIN 81 MG/1
81 TABLET ORAL DAILY
Qty: 30 TABLET | Refills: 0 | Status: SHIPPED | OUTPATIENT
Start: 2020-08-12 | End: 2020-12-17 | Stop reason: SDUPTHER

## 2020-08-12 RX ORDER — ERGOCALCIFEROL 1.25 MG/1
50000 CAPSULE ORAL WEEKLY
Qty: 12 CAPSULE | Refills: 0 | Status: SHIPPED | OUTPATIENT
Start: 2020-08-12

## 2020-08-12 NOTE — PROGRESS NOTES
Assessment and Plan:     Problem List Items Addressed This Visit        Respiratory    Chronic obstructive pulmonary disease ([address] )       Other    Vitamin D deficiency    Relevant Medications    ergocalciferol (VITAMIN D2) 50,000 units      Other Visit Diagnoses     Medicare annual wellness visit, subsequent    -  Primary    Generalized pain        Relevant Medications    aspirin (RA Aspirin EC Adult [address]) 81 mg EC tablet    Screening for prostate cancer        Relevant Orders    PSA, total and free    Encounter for abdominal aortic aneurysm (AAA) screening        Relevant Orders    US abdominal aorta screening aaa    Encounter for immunization        Relevant Orders    Pneumococcal polysaccharide vaccine 23-valent greater than or equal to 3yo subcutaneous/IM          Tobacco Cessation Counseling: Tobacco cessation counseling was not provided  The patient is sincerely urged to quit consumption of tobacco  He is not ready to quit tobacco      Preventive health issues were discussed with patient, and age appropriate screening tests were ordered as noted in patient's After Visit Summary  Personalized health advice and appropriate referrals for health education or preventive services given if needed, as noted in patient's After Visit Summary  History of Present Illness:     Patient presents for Medicare Annual Wellness visit  He is doing well  Trying to get a job as a   Discussed 9 lb weight gain since last visit in June 2020  He denies any dietary changes       Patient Care Team:  Refugio Glasgow PA-C as PCP - General  DO Avila Baets DO Ponce Munro, PA-C Francyne Rumps, DO Saundra Dicker, MD as Endoscopist     Problem List:     Patient Active Problem List   Diagnosis    Alcohol use    Chronic obstructive pulmonary disease [facility])    Essential hypertension    Arteriosclerotic coronary artery disease    Hypertension    Hypercholesterolemia    Chronic back pain    Chronic diastolic CHF (congestive heart failure) (HCC)    Obstructive sleep apnea    Asthma    Bilateral low back pain without sciatica    Diastolic dysfunction    Disc degeneration, lumbar    Hyperplastic polyp of sigmoid colon    Lumbar radiculopathy    Nonalcoholic fatty liver disease    Osteoporosis without current pathological fracture    Sacroiliitis (HCC)    Vitamin D deficiency    Continuous chronic alcoholism (HCC)    Morbid (severe) obesity due to excess calories [facility])      Past Medical and Surgical History:     Past Medical History:   Diagnosis Date    CAD (coronary artery disease)     Chronic back pain     Chronic diastolic CHF (congestive heart failure) (HCC)     COPD (chronic obstructive pulmonary disease) (HCC)     HLD (hyperlipidemia)     Hypertension     ALEXYS (obstructive sleep apnea)     Shortness of breath     Vitamin D deficiency      Past Surgical History:   Procedure Laterality Date    DENTAL SURGERY      ME COLONOSCOPY FLX DX W/COLLJ SPEC WHEN PFRMD N/A 10/6/2017    Procedure: COLONOSCOPY;  Surgeon: Bethel Garcia MD;  Location: [facility];  Service: Gastroenterology    TONSILLECTOMY        Family History:     Family History   Problem Relation Age of Onset    Diabetes Mother       Social History:     E-Cigarette/Vaping    E-Cigarette Use Never User      E-Cigarette/Vaping Substances    Nicotine No     THC No     CBD No     Flavoring No     Other No     Unknown No      Social History     Socioeconomic History    Marital status: Single     Spouse name: None    Number of children: None    Years of education: None    Highest education level: None   Occupational History    None   Social Needs    Financial resource strain: None    Food insecurity     Worry: None     Inability: None    Transportation needs     Medical: None     Non-medical: None   Tobacco Use    Smoking status: Heavy Tobacco Smoker     Packs/day: 0 50 Years: 20 00     Pack years: 10 00     Types: Cigarettes    Smokeless tobacco: Never Used   Substance and Sexual Activity    Alcohol use: No     Comment: social    Drug use: No    Sexual activity: Not Currently   Lifestyle    Physical activity     Days per week: None     Minutes per session: None    Stress: None   Relationships    Social connections     Talks on phone: None     Gets together: None     Attends Oriental orthodox service: None     Active member of club or organization: None     Attends meetings of clubs or organizations: None     Relationship status: None    Intimate partner violence     Fear of current or ex partner: None     Emotionally abused: None     Physically abused: None     Forced sexual activity: None   Other Topics Concern    None   Social History Narrative    None      Medications and Allergies:     Current Outpatient Medications   Medication Sig Dispense Refill    budesonide-formoterol (SYMBICORT) 160-4 5 mcg/act inhaler Inhale 2 puffs daily 3 Inhaler 0    ergocalciferol (VITAMIN D2) 50,000 units Take 1 capsule (50,000 Units total) by mouth once a week 12 capsule 0    furosemide (LASIX) 20 mg tablet TAKE 1 TABLET EVERY OTHER DAY (Patient taking differently: Take 20 mg by mouth daily ) 45 tablet 0    lisinopril (ZESTRIL) 40 mg tablet TAKE 1 TABLET EVERY DAY 90 tablet 0    metoprolol tartrate (LOPRESSOR) 50 mg tablet TAKE 1 TABLET EVERY 12 HOURS 180 tablet 0    ondansetron (ZOFRAN) 4 mg tablet Take 2 tablets (8 mg total) by mouth every 8 (eight) hours as needed for nausea or vomiting 12 tablet 0    potassium chloride (K-DUR,KLOR-CON) 10 mEq tablet TAKE 1 TABLET EVERY OTHER DAY 45 tablet 0    pravastatin (PRAVACHOL) 80 mg tablet TAKE 1 TABLET EVERY DAY AT BEDTIME 90 tablet 0    tiotropium (Spiriva HandiHaler) 18 mcg inhalation capsule Place 1 capsule (18 mcg total) into inhaler and inhale daily 30 capsule 0    zolpidem (AMBIEN) 5 mg tablet Take 1 tablet (5 mg total) by mouth daily at bedtime as needed for sleep 30 tablet 0    albuterol (VENTOLIN HFA) 90 mcg/act inhaler Inhale 2 puffs every 4 (four) hours as needed for wheezing or shortness of breath (Patient not taking: Reported on 8/12/2020) 1 Inhaler 0    aspirin (RA Aspirin EC Adult Low St) 81 mg EC tablet Take 1 tablet (81 mg total) by mouth daily 30 tablet 0    mupirocin (BACTROBAN) 2 % cream Apply topically 3 (three) times a day To affected areas  (Patient not taking: Reported on 6/3/2020) 15 g 0     No current facility-administered medications for this visit  Allergies   Allergen Reactions    Penicillins Anaphylaxis      Immunizations:     Immunization History   Administered Date(s) Administered    INFLUENZA 12/11/2014    Influenza TIV (IM) 10/15/2013    Tdap 10/15/2013, 07/25/2016    Zoster 05/17/2016      Health Maintenance:         Topic Date Due    Hepatitis C Screening  Completed         Topic Date Due    Pneumococcal Vaccine: Pediatrics (0 to 5 Years) and At-Risk Patients (6 to 59 Years) (1 of 1 - PPSV23) [birth year]1966    Influenza Vaccine  07/01/2020      Medicare Health Risk Assessment:     /92 (BP Location: Left arm, Patient Position: Sitting, Cuff Size: Large)   Pulse 62   Temp 98 7 °F (37 1 °C) (Tympanic)   Resp 18   Ht 5' 6" (1 676 m)   Wt 118 kg (259 lb 12 8 oz)   SpO2 97%   BMI 41 93 kg/m²      Issa Hauser is here for his Subsequent Wellness visit  Last Medicare Wellness visit information reviewed, patient interviewed, no change since last AWV  Health Risk Assessment:   Patient rates overall health as good  Patient feels that their physical health rating is slightly worse  Eyesight was rated as same  Hearing was rated as same  Patient feels that their emotional and mental health rating is same  Pain experienced in the last 7 days has been none  Patient states that he has experienced weight loss or gain in last 6 months  Fall Risk Screening:    In the past year, patient has experienced: no history of falling in past year      Home Safety:  Patient does not have trouble with stairs inside or outside of their home  Patient has working smoke alarms and has working carbon monoxide detector  Home safety hazards include: none  Nutrition:   Current diet is Regular  Patient states he is trying to watch sodium and fats intake  Medications:   Patient is not currently taking any over-the-counter supplements  Patient is able to manage medications  Activities of Daily Living (ADLs)/Instrumental Activities of Daily Living (IADLs):   Walk and transfer into and out of bed and chair?: Yes  Dress and groom yourself?: Yes    Bathe or shower yourself?: Yes    Feed yourself?  Yes  Do your laundry/housekeeping?: Yes  Manage your money, pay your bills and track your expenses?: Yes  Make your own meals?: Yes    Do your own shopping?: Yes    Durable Medical Equipment Suppliers  [facility]    Previous Hospitalizations:   Any hospitalizations or ED visits within the last 12 months?: No      Advance Care Planning:   Living will: No    Durable POA for healthcare: No    Advanced directive: No    Advanced directive counseling given: Yes    Five wishes given: Yes    Patient declined ACP directive: No      Cognitive Screening:   Provider or family/friend/caregiver concerned regarding cognition?: No    PREVENTIVE SCREENINGS      Cardiovascular Screening:    General: Screening Not Indicated and History Lipid Disorder      Diabetes Screening:     General: Screening Current      Colorectal Cancer Screening:     General: Screening Current      Prostate Cancer Screening:    General: Risks and Benefits Discussed    Due for: PSA      Osteoporosis Screening:    General: Screening Not Indicated and History Osteoporosis      Abdominal Aortic Aneurysm (AAA) Screening:    Risk factors include: tobacco use        General: Risks and Benefits Discussed    Due for: Screening AAA Ultrasound      Lung Cancer Screening:     General: Screening Not Indicated      Hepatitis C Screening:    General: Screening Current    Other Counseling Topics:   Alcohol use counseling, car/seat belt/driving safety, skin self-exam, sunscreen and calcium and vitamin D intake and regular weightbearing exercise         Gonzalo Gonzalez PA-C

## 2020-08-12 NOTE — PATIENT INSTRUCTIONS
Medicare Preventive Visit Patient Instructions  Thank you for completing your Welcome to Medicare Visit or Medicare Annual Wellness Visit today  Your next wellness visit will be due in one year (8/12/2021)  The screening/preventive services that you may require over the next 5-10 years are detailed below  Some tests may not apply to you based off risk factors and/or age  Screening tests ordered at today's visit but not completed yet may show as past due  Also, please note that scanned in results may not display below  Preventive Screenings:  Service Recommendations Previous Testing/Comments   Colorectal Cancer Screening  · Colonoscopy    · Fecal Occult Blood Test (FOBT)/Fecal Immunochemical Test (FIT)  · Fecal DNA/Cologuard Test  · Flexible Sigmoidoscopy Age: 54-65 years old   Colonoscopy: every 10 years (May be performed more frequently if at higher risk)  OR  FOBT/FIT: every 1 year  OR  Cologuard: every 3 years  OR  Sigmoidoscopy: every 5 years  Screening may be recommended earlier than age 48 if at higher risk for colorectal cancer  Also, an individualized decision between you and your healthcare provider will decide whether screening between the ages of 74-80 would be appropriate   Colonoscopy: 10/06/2017  FOBT/FIT: Not on file  Cologuard: Not on file  Sigmoidoscopy: Not on file    Screening Current     Prostate Cancer Screening Individualized decision between patient and health care provider in men between ages of 53-78   Medicare will cover every 12 months beginning on the day after your 50th birthday PSA: No results in last 5 years          Hepatitis C Screening Once for adults born between 1945 and 1965  More frequently in patients at high risk for Hepatitis C Hep C Antibody: 09/23/2019    Screening Current   Diabetes Screening 1-2 times per year if you're at risk for diabetes or have pre-diabetes Fasting glucose: 107 mg/dL   A1C: 5 7 %    Screening Current   Cholesterol Screening Once every 5 years if you don't have a lipid disorder  May order more often based on risk factors  Lipid panel: 06/08/2020    Screening Not Indicated  History Lipid Disorder      Other Preventive Screenings Covered by Medicare:  1  Abdominal Aortic Aneurysm (AAA) Screening: covered once if your at risk  You're considered to be at risk if you have a family history of AAA or a male between the age of 73-68 who smoking at least 100 cigarettes in your lifetime  2  Lung Cancer Screening: covers low dose CT scan once per year if you meet all of the following conditions: (1) Age 50-69; (2) No signs or symptoms of lung cancer; (3) Current smoker or have quit smoking within the last 15 years; (4) You have a tobacco smoking history of at least 30 pack years (packs per day x number of years you smoked); (5) You get a written order from a healthcare provider  3  Glaucoma Screening: covered annually if you're considered high risk: (1) You have diabetes OR (2) Family history of glaucoma OR (3)  aged 48 and older OR (3)  American aged 72 and older  3  Osteoporosis Screening: covered every 2 years if you meet one of the following conditions: (1) Have a vertebral abnormality; (2) On glucocorticoid therapy for more than 3 months; (3) Have primary hyperparathyroidism; (4) On osteoporosis medications and need to assess response to drug therapy  5  HIV Screening: covered annually if you're between the age of 12-76  Also covered annually if you are younger than 13 and older than 72 with risk factors for HIV infection  For pregnant patients, it is covered up to 3 times per pregnancy      Immunizations:  Immunization Recommendations   Influenza Vaccine Annual influenza vaccination during flu season is recommended for all persons aged >= 6 months who do not have contraindications   Pneumococcal Vaccine (Prevnar and Pneumovax)  * Prevnar = PCV13  * Pneumovax = PPSV23 Adults 25-60 years old: 1-3 doses may be recommended based on certain risk factors  Adults 72 years old: Prevnar (PCV13) vaccine recommended followed by Pneumovax (PPSV23) vaccine  If already received PPSV23 since turning 65, then PCV13 recommended at least one year after PPSV23 dose  Hepatitis B Vaccine 3 dose series if at intermediate or high risk (ex: diabetes, end stage renal disease, liver disease)   Tetanus (Td) Vaccine - COST NOT COVERED BY MEDICARE PART B Following completion of primary series, a booster dose should be given every 10 years to maintain immunity against tetanus  Td may also be given as tetanus wound prophylaxis  Tdap Vaccine - COST NOT COVERED BY MEDICARE PART B Recommended at least once for all adults  For pregnant patients, recommended with each pregnancy  Shingles Vaccine (Shingrix) - COST NOT COVERED BY MEDICARE PART B  2 shot series recommended in those aged 48 and above     Health Maintenance Due:      Topic Date Due    Hepatitis C Screening  Completed     Immunizations Due:      Topic Date Due    Pneumococcal Vaccine: Pediatrics (0 to 5 Years) and At-Risk Patients (6 to 59 Years) (1 of 1 - PPSV23) 06[birth year]/1966    Influenza Vaccine  07/01/2020     Advance Directives   What are advance directives? Advance directives are legal documents that state your wishes and plans for medical care  These plans are made ahead of time in case you lose your ability to make decisions for yourself  Advance directives can apply to any medical decision, such as the treatments you want, and if you want to donate organs  What are the types of advance directives? There are many types of advance directives, and each state has rules about how to use them  You may choose a combination of any of the following:  · Living will: This is a written record of the treatment you want  You can also choose which treatments you do not want, which to limit, and which to stop at a certain time  This includes surgery, medicine, IV fluid, and tube feedings     · Durable power of  for [facility]): This is a written record that states who you want to make healthcare choices for you when you are unable to make them for yourself  This person, called a proxy, is usually a family member or a friend  You may choose more than 1 proxy  · Do not resuscitate (DNR) order:  A DNR order is used in case your heart stops beating or you stop breathing  It is a request not to have certain forms of treatment, such as CPR  A DNR order may be included in other types of advance directives  · Medical directive: This covers the care that you want if you are in a coma, near death, or unable to make decisions for yourself  You can list the treatments you want for each condition  Treatment may include pain medicine, surgery, blood transfusions, dialysis, IV or tube feedings, and a ventilator (breathing machine)  · Values history: This document has questions about your views, beliefs, and how you feel and think about life  This information can help others choose the care that you would choose  Why are advance directives important? An advance directive helps you control your care  Although spoken wishes may be used, it is better to have your wishes written down  Spoken wishes can be misunderstood, or not followed  Treatments may be given even if you do not want them  An advance directive may make it easier for your family to make difficult choices about your care  Cigarette Smoking and Your Health   Risks to your health if you smoke:  Nicotine and other chemicals found in tobacco damage every cell in your body  Even if you are a light smoker, you have an increased risk for cancer, heart disease, and lung disease  If you are pregnant or have diabetes, smoking increases your risk for complications  Benefits to your health if you stop smoking:   · You decrease respiratory symptoms such as coughing, wheezing, and shortness of breath     · You reduce your risk for cancers of the lung, mouth, throat, kidney, bladder, pancreas, stomach, and cervix  If you already have cancer, you increase the benefits of chemotherapy  You also reduce your risk for cancer returning or a second cancer from developing  · You reduce your risk for heart disease, blood clots, heart attack, and stroke  · You reduce your risk for lung infections, and diseases such as pneumonia, asthma, chronic bronchitis, and emphysema  · Your circulation improves  More oxygen can be delivered to your body  If you have diabetes, you lower your risk for complications, such as kidney, artery, and eye diseases  You also lower your risk for nerve damage  Nerve damage can lead to amputations, poor vision, and blindness  · You improve your body's ability to heal and to fight infections  For more information and support to stop smoking:   · [facility]  Phone: 2- 729 - [phone]  Web Address: "[employer]"  Weight Management   Why it is important to manage your weight:  Being overweight increases your risk of health conditions such as heart disease, high blood pressure, type 2 diabetes, and certain types of cancer  It can also increase your risk for osteoarthritis, sleep apnea, and other respiratory problems  Aim for a slow, steady weight loss  Even a small amount of weight loss can lower your risk of health problems  How to lose weight safely:  A safe and healthy way to lose weight is to eat fewer calories and get regular exercise  You can lose up about 1 pound a week by decreasing the number of calories you eat by 500 calories each day  Healthy meal plan for weight management:  A healthy meal plan includes a variety of foods, contains fewer calories, and helps you stay healthy  A healthy meal plan includes the following:  · Eat whole-grain foods more often  A healthy meal plan should contain fiber  Fiber is the part of grains, fruits, and vegetables that is not broken down by your body   Whole-grain foods are healthy and provide extra fiber in your diet  Some examples of whole-grain foods are whole-wheat breads and pastas, oatmeal, brown rice, and bulgur  · Eat a variety of vegetables every day  Include dark, leafy greens such as spinach, kale, pilo greens, and mustard greens  Eat yellow and orange vegetables such as carrots, sweet potatoes, and winter squash  · Eat a variety of fruits every day  Choose fresh or canned fruit (canned in its own juice or light syrup) instead of juice  Fruit juice has very little or no fiber  · Eat low-fat dairy foods  Drink fat-free (skim) milk or 1% milk  Eat fat-free yogurt and low-fat cottage cheese  Try low-fat cheeses such as mozzarella and other reduced-fat cheeses  · Choose meat and other protein foods that are low in fat  Choose beans or other legumes such as split peas or lentils  Choose fish, skinless poultry (chicken or turkey), or lean cuts of red meat (beef or pork)  Before you cook meat or poultry, cut off any visible fat  · Use less fat and oil  Try baking foods instead of frying them  Add less fat, such as margarine, sour cream, regular salad dressing and mayonnaise to foods  Eat fewer high-fat foods  Some examples of high-fat foods include french fries, doughnuts, ice cream, and cakes  · Eat fewer sweets  Limit foods and drinks that are high in sugar  This includes candy, cookies, regular soda, and sweetened drinks  Exercise:  Exercise at least 30 minutes per day on most days of the week  Some examples of exercise include walking, biking, dancing, and swimming  You can also fit in more physical activity by taking the stairs instead of the elevator or parking farther away from stores  Ask your healthcare provider about the best exercise plan for you  © Copyright [employer] 2018 Information is for End User's use only and may not be sold, redistributed or otherwise used for commercial purposes   All illustrations and images included in CareNotes® are the copyrighted property of [employer]  or [address] [place] [address]

## 2020-08-14 ENCOUNTER — HOSPITAL ENCOUNTER (OUTPATIENT)
Dept: RADIOLOGY | Facility: HOSPITAL | Age: 60
Discharge: HOME | End: 2020-08-14
Payer: COMMERCIAL

## 2020-08-14 ENCOUNTER — TELEPHONE (OUTPATIENT)
Dept: FAMILY MEDICINE CLINIC | Facility: CLINIC | Age: 60
End: 2020-08-14

## 2020-08-14 DIAGNOSIS — R76.11 POSITIVE PPD: Primary | ICD-10-CM

## 2020-08-14 DIAGNOSIS — R76.11 POSITIVE PPD: ICD-10-CM

## 2020-08-14 PROCEDURE — 71046 X-RAY EXAM CHEST 2 VIEWS: CPT

## 2020-08-17 ENCOUNTER — TELEPHONE (OUTPATIENT)
Dept: FAMILY MEDICINE CLINIC | Facility: CLINIC | Age: 60
End: 2020-08-17

## 2020-08-21 ENCOUNTER — TELEPHONE (OUTPATIENT)
Dept: GASTROENTEROLOGY | Facility: CLINIC | Age: 60
End: 2020-08-21

## 2020-08-21 NOTE — TELEPHONE ENCOUNTER
Slim Mcnulty,     Patient last seen in office in 2016  He has multiple co-morbidities including COPD, sleep apnea, heart disease  He should be seen in office prior to scope      Thanks  Donna Marshall

## 2020-08-21 NOTE — TELEPHONE ENCOUNTER
Patient is due for 3 year colonoscopy  Does patient need to come in the office prior or can he be directly scheduled  Please advise

## 2020-08-24 DIAGNOSIS — I10 ESSENTIAL HYPERTENSION: ICD-10-CM

## 2020-08-25 ENCOUNTER — APPOINTMENT (EMERGENCY)
Dept: RADIOLOGY | Facility: HOSPITAL | Age: 60
End: 2020-08-25
Payer: COMMERCIAL

## 2020-08-25 ENCOUNTER — HOSPITAL ENCOUNTER (EMERGENCY)
Facility: HOSPITAL | Age: 60
Discharge: HOME | End: 2020-08-25
Attending: EMERGENCY MEDICINE | Admitting: EMERGENCY MEDICINE
Payer: COMMERCIAL

## 2020-08-25 VITALS
OXYGEN SATURATION: 98 % | SYSTOLIC BLOOD PRESSURE: 130 MMHG | TEMPERATURE: 96.5 F | HEART RATE: 60 BPM | DIASTOLIC BLOOD PRESSURE: 76 MMHG | BODY MASS INDEX: 41.92 KG/M2 | WEIGHT: 259.7 LBS | RESPIRATION RATE: 16 BRPM

## 2020-08-25 DIAGNOSIS — S82.401A CLOSED RIGHT FIBULAR FRACTURE: Primary | ICD-10-CM

## 2020-08-25 PROCEDURE — 73610 X-RAY EXAM OF ANKLE: CPT

## 2020-08-25 PROCEDURE — 29515 APPLICATION SHORT LEG SPLINT: CPT | Performed by: EMERGENCY MEDICINE

## 2020-08-25 PROCEDURE — 73590 X-RAY EXAM OF LOWER LEG: CPT

## 2020-08-25 PROCEDURE — 99283 EMERGENCY DEPT VISIT LOW MDM: CPT

## 2020-08-25 PROCEDURE — 73630 X-RAY EXAM OF FOOT: CPT

## 2020-08-25 PROCEDURE — 99284 EMERGENCY DEPT VISIT MOD MDM: CPT | Performed by: EMERGENCY MEDICINE

## 2020-08-25 RX ORDER — POTASSIUM CHLORIDE 750 MG/1
TABLET, EXTENDED RELEASE ORAL
Qty: 45 TABLET | Refills: 0 | Status: SHIPPED | OUTPATIENT
Start: 2020-08-25 | End: 2020-11-24

## 2020-08-25 RX ORDER — OXYCODONE HYDROCHLORIDE AND ACETAMINOPHEN 5; 325 MG/1; MG/1
1 TABLET ORAL EVERY 4 HOURS PRN
Qty: 5 TABLET | Refills: 0 | Status: SHIPPED | OUTPATIENT
Start: 2020-08-25

## 2020-08-25 RX ORDER — METOPROLOL TARTRATE 50 MG/1
TABLET, FILM COATED ORAL
Qty: 180 TABLET | Refills: 0 | Status: SHIPPED | OUTPATIENT
Start: 2020-08-25 | End: 2020-11-24

## 2020-08-25 RX ORDER — FUROSEMIDE 20 MG/1
TABLET ORAL
Qty: 45 TABLET | Refills: 0 | Status: SHIPPED | OUTPATIENT
Start: 2020-08-25 | End: 2020-11-24

## 2020-08-25 NOTE — Clinical Note
Aurora Argueta was seen and treated in our emergency department on 8/25/2020  Diagnosis:     Gerardo Bender  may return to work on return date  He may return on this date: 08/28/2020         If you have any questions or concerns, please don't hesitate to call        Sheba Magana MD    ______________________________           _______________          _______________  Hospital Representative                              Date                                Time

## 2020-08-25 NOTE — ED PROVIDER NOTES
History  Chief Complaint   Patient presents with    Leg Injury     Yesterday stepped on dog bone in dark and fell onto right side  Complains of right foot/calf pain    Foot Injury     HPI  This is a 10year-old male comes in with right lower leg pain as well as pain in his ankle and foot  Overnight, patient was walking, any stepped on a dog bone causing an inversion injury to his right foot  Since then he has had increasing pain  Pain is worse with walking  States the pain is on the lateral aspect of his lower leg and ankle  Denies any knee pain, denies any hip pain  Patient did fall to the ground but denies hitting his head  Denies any chest pain or abdominal pain  Prior to Admission Medications   Prescriptions Last Dose Informant Patient Reported? Taking? albuterol (VENTOLIN HFA) 90 mcg/act inhaler   No Yes   Sig: Inhale 2 puffs every 4 (four) hours as needed for wheezing or shortness of breath   aspirin (RA Aspirin EC Adult Low St) 81 mg EC tablet Not Taking at Unknown time  No No   Sig: Take 1 tablet (81 mg total) by mouth daily   Patient not taking: Reported on 8/25/2020   budesonide-formoterol (SYMBICORT) 160-4 5 mcg/act inhaler Not Taking at Unknown time  No No   Sig: Inhale 2 puffs daily   Patient not taking: Reported on 8/25/2020   ergocalciferol (VITAMIN D2) 50,000 units   No Yes   Sig: Take 1 capsule (50,000 Units total) by mouth once a week   furosemide (LASIX) 20 mg tablet   No No   Sig: TAKE 1 TABLET EVERY OTHER DAY   lisinopril (ZESTRIL) 40 mg tablet   No Yes   Sig: TAKE 1 TABLET EVERY DAY   metoprolol tartrate (LOPRESSOR) 50 mg tablet   No No   Sig: TAKE 1 TABLET EVERY 12 HOURS   mupirocin (BACTROBAN) 2 % cream   No No   Sig: Apply topically 3 (three) times a day To affected areas     Patient not taking: Reported on 6/3/2020   ondansetron (ZOFRAN) 4 mg tablet   No No   Sig: Take 2 tablets (8 mg total) by mouth every 8 (eight) hours as needed for nausea or vomiting   potassium chloride (K-DUR,KLOR-CON) 10 mEq tablet   No No   Sig: TAKE 1 TABLET EVERY OTHER DAY   pravastatin (PRAVACHOL) 80 mg tablet   No Yes   Sig: TAKE 1 TABLET EVERY DAY AT BEDTIME   tiotropium (Spiriva HandiHaler) 18 mcg inhalation capsule   No No   Sig: Place 1 capsule (18 mcg total) into inhaler and inhale daily   zolpidem (AMBIEN) 5 mg tablet   No Yes   Sig: Take 1 tablet (5 mg total) by mouth daily at bedtime as needed for sleep      Facility-Administered Medications: None       Past Medical History:   Diagnosis Date    CAD (coronary artery disease)     Chronic back pain     Chronic diastolic CHF (congestive heart failure) (HCC)     COPD (chronic obstructive pulmonary disease) (HCC)     HLD (hyperlipidemia)     Hypertension     ALEXYS (obstructive sleep apnea)     Shortness of breath     Vitamin D deficiency        Past Surgical History:   Procedure Laterality Date    DENTAL SURGERY      OR COLONOSCOPY FLX DX W/COLLJ SPEC WHEN PFRMD N/A 10/6/2017    Procedure: COLONOSCOPY;  Surgeon: Winifred Galaviz MD;  Location: [facility];  Service: Gastroenterology    TONSILLECTOMY         Family History   Problem Relation Age of Onset    Diabetes Mother      I have reviewed and agree with the history as documented  E-Cigarette/Vaping    E-Cigarette Use Never User      E-Cigarette/Vaping Substances    Nicotine No     THC No     CBD No     Flavoring No     Other No     Unknown No      Social History     Tobacco Use    Smoking status: Heavy Tobacco Smoker     Packs/day: 0 50     Years: 20 00     Pack years: 10 00     Types: Cigarettes    Smokeless tobacco: Never Used   Substance Use Topics    Alcohol use: No     Comment: social    Drug use: No       Review of Systems   Constitutional: Negative  Negative for chills and fever  HENT: Negative  Negative for ear pain and sore throat  Eyes: Negative  Negative for pain and discharge  Respiratory: Negative  Negative for chest tightness and shortness of breath  Cardiovascular: Negative  Negative for chest pain and palpitations  Gastrointestinal: Negative  Negative for abdominal pain, nausea and vomiting  Endocrine: Negative  Negative for polyphagia and polyuria  Genitourinary: Negative  Negative for dysuria and flank pain  Musculoskeletal: Negative for arthralgias and back pain  Patient has right lower leg pain   Skin: Negative  Negative for color change and wound  Allergic/Immunologic: Negative  Negative for food allergies and immunocompromised state  Neurological: Negative  Negative for weakness and headaches  Hematological: Negative  Negative for adenopathy  Does not bruise/bleed easily  Psychiatric/Behavioral: Negative  Negative for suicidal ideas  The patient is not nervous/anxious  Physical Exam  Physical Exam  Vitals signs and nursing note reviewed  Constitutional:       General: He is not in acute distress  Appearance: He is well-developed  He is not diaphoretic  HENT:      Head: Normocephalic and atraumatic  Right Ear: External ear normal       Left Ear: External ear normal    Eyes:      General: No scleral icterus  Right eye: No discharge  Left eye: No discharge  Conjunctiva/sclera: Conjunctivae normal       Pupils: Pupils are equal, round, and reactive to light  Neck:      Musculoskeletal: Normal range of motion and neck supple  Thyroid: No thyromegaly  Trachea: No tracheal deviation  Cardiovascular:      Rate and Rhythm: Normal rate and regular rhythm  Heart sounds: No murmur  No friction rub  No gallop  Pulmonary:      Effort: Pulmonary effort is normal  No respiratory distress  Breath sounds: Normal breath sounds  No stridor  No wheezing or rales  Abdominal:      General: Bowel sounds are normal  There is no distension  Palpations: Abdomen is soft  Tenderness: There is no abdominal tenderness  There is no guarding or rebound     Musculoskeletal: Normal range of motion  General: Tenderness present  No deformity  Comments: Right lower leg is tender to touch however compartments are soft and symmetric compared to left  Dorsalis pedis pulses are symmetric bilaterally  Skin:     General: Skin is warm and dry  Findings: No erythema or rash  Neurological:      Mental Status: He is alert and oriented to person, place, and time  Cranial Nerves: No cranial nerve deficit  Psychiatric:         Behavior: Behavior normal          Thought Content: Thought content normal          Vital Signs  ED Triage Vitals   Temperature Pulse Respirations Blood Pressure SpO2   08/25/20 0836 08/25/20 0836 08/25/20 0836 08/25/20 0836 08/25/20 0836   (!) 96 5 °F (35 8 °C) 63 17 139/84 98 %      Temp Source Heart Rate Source Patient Position - Orthostatic VS BP Location FiO2 (%)   08/25/20 0836 08/25/20 0836 08/25/20 1030 08/25/20 0836 --   Temporal Monitor Lying Left arm       Pain Score       08/25/20 0836       6           Vitals:    08/25/20 0836 08/25/20 1030 08/25/20 1130   BP: 139/84 128/82 130/76   Pulse: 63 (!) 52 60   Patient Position - Orthostatic VS:  Lying Lying         Visual Acuity      ED Medications  Medications - No data to display    Diagnostic Studies  Results Reviewed     None                 XR ankle 3+ views RIGHT   ED Interpretation by Dillon Choi MD (08/25 1046)   Distal fibula fracture      Final Result by Salvador Quiles MD (08/25 1214)      Acute fracture of the distal fibula without displacement  Workstation performed: [id number]         XR foot 3+ views RIGHT   ED Interpretation by Dillon Choi MD (08/25 1046)   No fracture      Final Result by Salvador Quiles MD (08/25 1213)      Distal fibular fracture  Foot intact        Workstation performed: [id number]         XR tibia fibula 2 views RIGHT   ED Interpretation by Dillon Choi MD (08/25 1044)   Distal fibula fracture      Final Result by Salvador Quiles MD (08/25 1216)      Distal fibular fracture without displacement  No other acute findings      Workstation performed: [id number]                    Procedures  Splint application    Date/Time: 8/25/2020 12:00 PM  Performed by: Therese Lora MD  Authorized by: Therese Lora MD     Patient location:  Bedside  Performing Provider:  Attending  [place] protocol:     Patient identity confirmed:  Verbally with patient  Indication:     Indications: fracture    Pre-procedure details:     Sensation:  Normal  Procedure details:     Laterality:  Right    Location:  Ankle    Ankle:  R ankle    Splint type:  Short leg (As well as stirrup)    Supplies:  Ortho-Glass  Post-procedure details:     Pain:  Unchanged    Sensation:  Normal    Neurovascular Exam: skin pink      Patient tolerance of procedure: Tolerated well, no immediate complications             ED Course  ED Course as of Aug 25 1624   Tue Aug 25, 2020   1154 PDMP was queried, last Percocet prescription was in 2019  Will discharge on 5 tabs of Percocet  Gave patient follow-up information for Orthopedics, instructed to make an appointment in a week  Patient verbalized understanding  US AUDIT      Most Recent Value   Initial Alcohol Screen: US AUDIT-C    1  How often do you have a drink containing alcohol?  0 Filed at: 08/25/2020 0942   2  How many drinks containing alcohol do you have on a typical day you are drinking? 0 Filed at: 08/25/2020 0942   3a  Male UNDER 65: How often do you have five or more drinks on one occasion? 0 Filed at: 08/25/2020 0942   3b  FEMALE Any Age, or MALE 65+: How often do you have 4 or more drinks on one occassion? 0 Filed at: 08/25/2020 0942   Audit-C Score  0 Filed at: 08/25/2020 9809                  ALVARADO/DAST-10      Most Recent Value   How many times in the past year have you    Used an illegal drug or used a prescription medication for non-medical reasons?   Never Filed at: 08/25/2020 [facility]  Number of Diagnoses or Management Options  Diagnosis management comments: Patient presents with right lower leg pain  Will get tib-fib x-ray to make sure this is not me some new fracture  Will get an x-ray of his right ankle and right foot  Disposition  Final diagnoses:   Closed right fibular fracture     Time reflects when diagnosis was documented in both MDM as applicable and the Disposition within this note     Time User Action Codes Description Comment    8/25/2020 11:20 AM Regina Marinelli Add [D57 107X] Closed right fibular fracture       ED Disposition     ED Disposition Condition Date/Time Comment    Discharge Stable Tue Aug 25, 2020 11:20 AM Jenna Siad discharge to home/self care              Follow-up Information     Follow up With Specialties Details Why Contact Info    Ana Everett MD Orthopedic Surgery Schedule an appointment as soon as possible for a visit in 1 week follow up being seen in the emergency department in 1 week [address] [place] [id number]  [phone]            Discharge Medication List as of 8/25/2020 11:22 AM      START taking these medications    Details   furosemide (LASIX) 20 mg tablet TAKE 1 TABLET EVERY OTHER DAY, Normal      metoprolol tartrate (LOPRESSOR) 50 mg tablet TAKE 1 TABLET EVERY 12 HOURS, Normal      potassium chloride (K-DUR,KLOR-CON) 10 mEq tablet TAKE 1 TABLET EVERY OTHER DAY, Normal         CONTINUE these medications which have NOT CHANGED    Details   albuterol (VENTOLIN HFA) 90 mcg/act inhaler Inhale 2 puffs every 4 (four) hours as needed for wheezing or shortness of breath, Starting Wed 6/26/2019, Normal      ergocalciferol (VITAMIN D2) 50,000 units Take 1 capsule (50,000 Units total) by mouth once a week, Starting Wed 8/12/2020, Normal      lisinopril (ZESTRIL) 40 mg tablet TAKE 1 TABLET EVERY DAY, Normal      pravastatin (PRAVACHOL) 80 mg tablet TAKE 1 TABLET EVERY DAY AT BEDTIME, Normal      zolpidem (AMBIEN) 5 mg tablet Take 1 tablet (5 mg total) by mouth daily at bedtime as needed for sleep, Starting Wed 6/3/2020, Normal      aspirin (RA Aspirin EC Adult Low St) 81 mg EC tablet Take 1 tablet (81 mg total) by mouth daily, Starting Wed 8/12/2020, Normal      budesonide-formoterol (SYMBICORT) 160-4 5 mcg/act inhaler Inhale 2 puffs daily, Starting Mon 2/24/2020, Normal      mupirocin (BACTROBAN) 2 % cream Apply topically 3 (three) times a day To affected areas  , Starting Sun 7/14/2019, Print      ondansetron (ZOFRAN) 4 mg tablet Take 2 tablets (8 mg total) by mouth every 8 (eight) hours as needed for nausea or vomiting, Starting Thu 1/3/2019, Print      tiotropium (Spiriva HandiHaler) 18 mcg inhalation capsule Place 1 capsule (18 mcg total) into inhaler and inhale daily, Starting Mon 2/24/2020, Normal           No discharge procedures on file      PDMP Review     None          ED Provider  Electronically Signed by           Beckie Deluna MD  08/25/20 1936

## 2020-08-25 NOTE — DISCHARGE INSTRUCTIONS
Until your evaluated by Orthopedics, you are not to walk on your right leg  Continue using crutches  I will give you work note for the next 3 days  Anything changes or worsens, please return emergency department

## 2020-08-26 ENCOUNTER — TELEPHONE (OUTPATIENT)
Dept: OBGYN CLINIC | Facility: HOSPITAL | Age: 60
End: 2020-08-26

## 2020-08-26 NOTE — TELEPHONE ENCOUNTER
Patient is calling to ask that we arrange transport with [facility]  Email sent to Radha Hare requesting coordination of transportation      Appt is on 09/02/20  at 3PM with Dr Arabella Solitario for a right foot fracture, unable to drive

## 2020-08-27 NOTE — TELEPHONE ENCOUNTER
Patient is eligible for 1 to 2 complimentary transports with [facility] [phone]  Patient will complete his paperwork at his upcoming appointment with Ortho 9/2/2020 @ 115p  Once completed this will be scanned into Epic  Staff or patient will call the mentioned # for pickup

## 2020-08-31 DIAGNOSIS — I10 ESSENTIAL HYPERTENSION: ICD-10-CM

## 2020-08-31 DIAGNOSIS — E78.5 DYSLIPIDEMIA: ICD-10-CM

## 2020-08-31 NOTE — TELEPHONE ENCOUNTER
Patient is just calling to confirm that transportation is arranged and he is set for Wednesday appt  I did advise him per notes above that transportation was arranged he said he didn't get a call back confirming        Callback #[phone]

## 2020-09-01 RX ORDER — LISINOPRIL 40 MG/1
TABLET ORAL
Qty: 90 TABLET | Refills: 1 | Status: SHIPPED | OUTPATIENT
Start: 2020-09-01

## 2020-09-01 RX ORDER — PRAVASTATIN SODIUM 80 MG/1
TABLET ORAL
Qty: 90 TABLET | Refills: 1 | Status: SHIPPED | OUTPATIENT
Start: 2020-09-01

## 2020-09-02 ENCOUNTER — APPOINTMENT (OUTPATIENT)
Dept: RADIOLOGY | Facility: MEDICAL CENTER | Age: 60
End: 2020-09-02
Payer: COMMERCIAL

## 2020-09-02 VITALS — BODY MASS INDEX: 41.62 KG/M2 | WEIGHT: 259 LBS | TEMPERATURE: 98.3 F | HEIGHT: 66 IN

## 2020-09-02 DIAGNOSIS — S82.832A OTHER CLOSED FRACTURE OF DISTAL END OF LEFT FIBULA, INITIAL ENCOUNTER: Primary | ICD-10-CM

## 2020-09-02 DIAGNOSIS — S82.832A OTHER CLOSED FRACTURE OF DISTAL END OF LEFT FIBULA, INITIAL ENCOUNTER: ICD-10-CM

## 2020-09-02 PROCEDURE — 99203 OFFICE O/P NEW LOW 30 MIN: CPT | Performed by: ORTHOPAEDIC SURGERY

## 2020-09-02 PROCEDURE — 27786 TREATMENT OF ANKLE FRACTURE: CPT | Performed by: ORTHOPAEDIC SURGERY

## 2020-09-02 PROCEDURE — 4004F PT TOBACCO SCREEN RCVD TLK: CPT | Performed by: ORTHOPAEDIC SURGERY

## 2020-09-02 PROCEDURE — 73610 X-RAY EXAM OF ANKLE: CPT

## 2020-09-02 NOTE — LETTER
September 2, 2020     Patient: Jody Mccracken   YOB: 1960   Date of Visit: 9/2/2020       To Whom it May Concern:    Jody Mccracken is under my professional care  He was seen in my office on 9/2/2020  He is to refrain from work related activity until cleared by physician beginning 9/2/2020  If you have any questions or concerns, please don't hesitate to call           Sincerely,          Nadeen Gowers, MD        CC: No Recipients

## 2020-09-02 NOTE — PROGRESS NOTES
Assessment:     1  Other closed fracture of distal end of left fibula, initial encounter          Plan:   Diagnoses and all orders for this visit:    Other closed fracture of distal end of left fibula, initial encounter  -     XR ankle 3+ vw right; Future  -     Walker       Discussed with patient that patient has a distal fibular fracture  This fracture pattern is very susceptible to displacement if he continues to bear weight through the right lower extremity  Stressed to the patient that he is to avoid putting any weight to the right lower extremity  He was placed in a well-padded plaster splint for stabilization  He was carefully instructed on ice, elevation, and compression techniques to eliminate swelling  He will be seen in 1 week, with x-rays of the right ankle out of the splint for re-evaluation, splint removal, and transition to short-leg cast as long his swelling is improved  He can continue NSAIDs/Tylenol as needed for pain and soreness  Patient ID: Donald Power is a 61 y o  male  Chief Complaint:   Left distal fibular fracture    HPI:  HPI  Patient presents today with chief complaint of right ankle pain secondary to injury sustained 8/25/2020  Patient reports that he tripped over dog bone at bottom of stairs resulting in a forced inversion ankle injury  He was evaluated as local [address]'s ED where x-rays were taken and read as significant for distal fibular fracture  He was placed in a posterior slab Ortho Glass splint and referred to Orthopedics  He was also instructed to observe nonweightbearing restriction via the use of bilateral crutches  On today's presentation he states that he has done his best to avoid weight-bearing, however he cannot safely use bilateral crutches, so he has been using 1 crutch under the right arm to offset as much weight in the right lower extremity as he can  He reports swelling in the foot in the ankle, and occasional numbness    He denies any mechanical symptoms  Allergy:  Allergies   Allergen Reactions    Penicillins Anaphylaxis       Medications:  all current active meds have been reviewed and current meds:   No current facility-administered medications for this visit  Past Medical History:  Past Medical History:   Diagnosis Date    CAD (coronary artery disease)     Chronic back pain     Chronic diastolic CHF (congestive heart failure) (HCC)     COPD (chronic obstructive pulmonary disease) (HCC)     HLD (hyperlipidemia)     Hypertension     ALEXYS (obstructive sleep apnea)     Shortness of breath     Vitamin D deficiency        Past Surgical History:  Past Surgical History:   Procedure Laterality Date    DENTAL SURGERY      MT COLONOSCOPY FLX DX W/COLLJ SPEC WHEN PFRMD N/A 10/6/2017    Procedure: COLONOSCOPY;  Surgeon: Isaac Jacques MD;  Location: [facility];  Service: Gastroenterology    TONSILLECTOMY         Family History:  Family History   Problem Relation Age of Onset    Diabetes Mother        Social History:  Social History     Substance and Sexual Activity   Alcohol Use No    Comment: social     Social History     Substance and Sexual Activity   Drug Use No     Social History     Tobacco Use   Smoking Status Heavy Tobacco Smoker    Packs/day: 0 50    Years: 20 00    Pack years: 10 00    Types: Cigarettes   Smokeless Tobacco Never Used         ROS:  Review of Systems   Constitutional: Negative for chills, fever and unexpected weight change  HENT: Negative for hearing loss, nosebleeds and sore throat  Eyes: Negative for pain, redness and visual disturbance  Respiratory: Negative for cough, shortness of breath and wheezing  Cardiovascular: Negative for chest pain, palpitations and leg swelling  Gastrointestinal: Negative for abdominal pain, nausea and vomiting  Endocrine: Negative for polydipsia and polyuria  Genitourinary: Negative for dysuria and hematuria     Musculoskeletal:        As noted in HPI   Skin: Negative for rash and wound  Neurological: Negative for dizziness, numbness and headaches  Psychiatric/Behavioral: Negative for decreased concentration and suicidal ideas  The patient is not nervous/anxious  Objective:  BP Readings from Last 1 Encounters:   08/25/20 130/76      Wt Readings from Last 1 Encounters:   09/02/20 117 kg (259 lb)        BMI:   Estimated body mass index is 41 8 kg/m² as calculated from the following:    Height as of this encounter: 5' 6" (1 676 m)  Weight as of this encounter: 117 kg (259 lb)  EXAM:   Physical Exam  Constitutional:       Appearance: He is well-developed  HENT:      Right Ear: External ear normal       Left Ear: External ear normal       Nose: Nose normal    Eyes:      Conjunctiva/sclera: Conjunctivae normal       Pupils: Pupils are equal, round, and reactive to light  Neck:      Musculoskeletal: Normal range of motion  Pulmonary:      Effort: Pulmonary effort is normal    Musculoskeletal:      Comments:  As noted in HPI   Skin:     General: Skin is warm and dry  Neurological:      Mental Status: He is alert and oriented to person, place, and time  Psychiatric:         Behavior: Behavior normal          Thought Content: Thought content normal          Judgment: Judgment normal         Left Ankle Exam   Left ankle exam is normal     Tenderness   The patient is experiencing no tenderness  Swelling: none    Range of Motion   The patient has normal left ankle ROM  Muscle Strength   The patient has normal left ankle strength      Tests   Anterior drawer: negative  Varus tilt: negative    Other   Erythema: absent  Sensation: normal  Pulse: present            Right ankle -   Patient presents using crutch under the right upper extremity observing partial weight-bearing on right lower extremity  Patient presents with posterior slab Ortho Glass splint was removed at time of visit without difficulty  Diffuse soft tissue swelling of the foot and ankle  Dark-colored ecchymosis noted in the lateral aspect of the foot and ankle  ROM limited  TTP over distal 1/3 fibular, at the level of and superior to the syndesmosis, mild medial tenderness  Negative Homans sign  -Covington sign  Neurovascularly intact distally    Radiographs:  Attending Physician has personally reviewed pertinent imaging and/or reports in PACS, impression is as follows:   Review of radiographic series taken 9/2/2020 of the right lower extremity shows oblique distal fibular fracture that starts at the mortise and goes proximally, reduced mortise, with well-maintained anatomical alignment       Fracture / Dislocation Treatment    Date/Time: 9/2/2020 3:31 PM  Performed by: Boogie Chacon MD  Authorized by: Boogie Chacon MD     Patient Location:  Clinic  Other Assisting Provider: No    Verbal consent obtained?: Yes    Consent given by:  Patient  Patient states understanding of procedure being performed: Yes    Patient identity confirmed:  Verbally with patient  Injury location:  Ankle  Location details:  Right ankle  Injury type:  Fracture  Fracture type: lateral malleolus    Fracture type: lateral malleolus    Neurovascular status: Neurovascularly intact    Distal perfusion: normal    Neurological function: normal    Range of motion: reduced    Local anesthesia used?: No    Manipulation performed?: No    Immobilization:  Splint  Splint type:  Short leg  Neurovascular status: Neurovascularly intact    Distal perfusion: normal    Neurological function: normal    Range of motion: unchanged    Patient tolerance:  Patient tolerated the procedure well with no immediate complications          Scribe Attestation    I,:   Doreen Pizarro am acting as a scribe while in the presence of the attending physician :        I,:   Boogie Chacon MD personally performed the services described in this documentation    as scribed in my presence :

## 2020-09-10 NOTE — TELEPHONE ENCOUNTER
Patient is calling asking to speak to Romelia KINGSTON  I let patient know that she would return his call in a little bit     [phone]

## 2020-09-14 ENCOUNTER — APPOINTMENT (OUTPATIENT)
Dept: RADIOLOGY | Facility: MEDICAL CENTER | Age: 60
End: 2020-09-14
Payer: COMMERCIAL

## 2020-09-14 VITALS
SYSTOLIC BLOOD PRESSURE: 107 MMHG | HEIGHT: 66 IN | DIASTOLIC BLOOD PRESSURE: 80 MMHG | BODY MASS INDEX: 41.8 KG/M2 | TEMPERATURE: 97.5 F | HEART RATE: 61 BPM

## 2020-09-14 DIAGNOSIS — S82.832A OTHER CLOSED FRACTURE OF DISTAL END OF LEFT FIBULA, INITIAL ENCOUNTER: ICD-10-CM

## 2020-09-14 DIAGNOSIS — S82.832A OTHER CLOSED FRACTURE OF DISTAL END OF LEFT FIBULA, INITIAL ENCOUNTER: Primary | ICD-10-CM

## 2020-09-14 PROCEDURE — 73610 X-RAY EXAM OF ANKLE: CPT

## 2020-09-14 PROCEDURE — 99024 POSTOP FOLLOW-UP VISIT: CPT | Performed by: ORTHOPAEDIC SURGERY

## 2020-09-14 PROCEDURE — 29405 APPL SHORT LEG CAST: CPT | Performed by: ORTHOPAEDIC SURGERY

## 2020-09-14 NOTE — LETTER
September 14, 2020     Patient: Sharyle Pontes   YOB: 1960   Date of Visit: 9/14/2020       To Whom it May Concern:    Sharyle Pontes is under my professional care  He was seen in my office on 9/14/2020  He should not return to work until cleared by a physician  Anticipate no RTW for at least 6 weeks  If you have any questions or concerns, please don't hesitate to call           Sincerely,          Jesusita Reynolds MD        CC: No Recipients

## 2020-09-14 NOTE — PROGRESS NOTES
Assessment:     1  Other closed fracture of distal end of left fibula, initial encounter          Plan:     Problem List Items Addressed This Visit     None      Visit Diagnoses     Other closed fracture of distal end of left fibula, initial encounter    -  Primary    Relevant Orders    XR ankle 3+ vw right          - NWB to LLE  - counseled on icing and elevation  - SLC placed today  - f/u 3 weeks with ankle xray OOC    Patient ID: Donald Power is a 61 y o  male  Chief Complaint:  Right distal fibular fracture     Subjective:  [de-identified] [age] year old male here for follow-up for a right distal fibula fracture,DOI [birth year]/25/2002  Patient states he has been nonweightbearing right lower extremity in a short-leg cast and a use of a walker or a crutch  But, on watching the patient walk in he is only using one crutch in putting near full weight on the ankle  Patient states he is having throbbing pain over the lateral and top aspect of the right ankle  Denies any numbness or tingling  He is taking over-the-counter NSAIDs and Tylenol for pain  Allergy:  Allergies   Allergen Reactions    Penicillins Anaphylaxis       Medications:  all current active meds have been reviewed    ROS:  Review of Systems   Musculoskeletal: Positive for joint swelling  Objective:  BP Readings from Last 1 Encounters:   09/14/20 107/80      Wt Readings from Last 1 Encounters:   09/02/20 117 kg (259 lb)        Exam:   Physical Exam  Right Ankle Exam     Other   Erythema: absent  Scars: absent  Sensation: normal  Pulse: present     Comments:  Minimal swelling, mild tenderness over the distal fibula                Radiographs:  I have personally reviewed pertinent films in PACS and my interpretation is Maintained alignment of the distal fibula fracture with no displacement  Well reduced mortise         Cast application    Date/Time: 9/14/2020 1:27 PM  Performed by: Kerry Flowers MD  Authorized by: Kerry Flowers MD     Consent:     Consent obtained:  Verbal    Consent given by:  Patient    Risks discussed:  Swelling  Pre-procedure details:     Sensation:  Normal  Procedure details:     Location:  Ankle    Ankle:  L ankleCast type:  Short leg    Supplies:  Cotton padding and fiberglass  Post-procedure details:     Pain:  Unchanged    Sensation:  Normal    Patient tolerance of procedure:   Tolerated well, no immediate complications        Scribe Attestation    I,:   Tyshawn Duron am acting as a scribe while in the presence of the attending physician :        I,:   Kita Morgan MD personally performed the services described in this documentation    as scribed in my presence :

## 2020-10-07 ENCOUNTER — APPOINTMENT (OUTPATIENT)
Dept: RADIOLOGY | Facility: MEDICAL CENTER | Age: 60
End: 2020-10-07
Payer: COMMERCIAL

## 2020-10-07 VITALS
HEIGHT: 66 IN | DIASTOLIC BLOOD PRESSURE: 101 MMHG | TEMPERATURE: 97.4 F | BODY MASS INDEX: 41.62 KG/M2 | HEART RATE: 62 BPM | WEIGHT: 259 LBS | SYSTOLIC BLOOD PRESSURE: 145 MMHG

## 2020-10-07 DIAGNOSIS — S82.831D OTHER CLOSED FRACTURE OF DISTAL END OF RIGHT FIBULA WITH ROUTINE HEALING, SUBSEQUENT ENCOUNTER: Primary | ICD-10-CM

## 2020-10-07 DIAGNOSIS — M25.571 ACUTE RIGHT ANKLE PAIN: ICD-10-CM

## 2020-10-07 PROCEDURE — 73610 X-RAY EXAM OF ANKLE: CPT

## 2020-10-07 PROCEDURE — 99024 POSTOP FOLLOW-UP VISIT: CPT | Performed by: ORTHOPAEDIC SURGERY

## 2020-11-04 ENCOUNTER — OFFICE VISIT (OUTPATIENT)
Dept: GASTROENTEROLOGY | Facility: CLINIC | Age: 60
End: 2020-11-04
Payer: COMMERCIAL

## 2020-11-04 VITALS
BODY MASS INDEX: 42.43 KG/M2 | DIASTOLIC BLOOD PRESSURE: 85 MMHG | SYSTOLIC BLOOD PRESSURE: 154 MMHG | WEIGHT: 264 LBS | TEMPERATURE: 98.5 F | HEIGHT: 66 IN | HEART RATE: 49 BPM

## 2020-11-04 DIAGNOSIS — K64.9 HEMORRHOIDS, UNSPECIFIED HEMORRHOID TYPE: ICD-10-CM

## 2020-11-04 DIAGNOSIS — Z86.010 HISTORY OF COLON POLYPS: Primary | ICD-10-CM

## 2020-11-04 PROCEDURE — 3077F SYST BP >= 140 MM HG: CPT | Performed by: INTERNAL MEDICINE

## 2020-11-04 PROCEDURE — 4004F PT TOBACCO SCREEN RCVD TLK: CPT | Performed by: INTERNAL MEDICINE

## 2020-11-04 PROCEDURE — 99214 OFFICE O/P EST MOD 30 MIN: CPT | Performed by: INTERNAL MEDICINE

## 2020-11-04 PROCEDURE — 3079F DIAST BP 80-89 MM HG: CPT | Performed by: INTERNAL MEDICINE

## 2020-11-04 PROCEDURE — 3008F BODY MASS INDEX DOCD: CPT | Performed by: INTERNAL MEDICINE

## 2020-11-04 RX ORDER — SODIUM, POTASSIUM,MAG SULFATES 17.5-3.13G
180 SOLUTION, RECONSTITUTED, ORAL ORAL ONCE
Qty: 180 ML | Refills: 0 | Status: SHIPPED | OUTPATIENT
Start: 2020-11-04 | End: 2020-11-04

## 2020-11-23 DIAGNOSIS — I10 ESSENTIAL HYPERTENSION: ICD-10-CM

## 2020-11-24 RX ORDER — FUROSEMIDE 20 MG/1
TABLET ORAL
Qty: 45 TABLET | Refills: 0 | Status: SHIPPED | OUTPATIENT
Start: 2020-11-24

## 2020-11-24 RX ORDER — POTASSIUM CHLORIDE 750 MG/1
TABLET, EXTENDED RELEASE ORAL
Qty: 45 TABLET | Refills: 0 | Status: SHIPPED | OUTPATIENT
Start: 2020-11-24

## 2020-11-24 RX ORDER — METOPROLOL TARTRATE 50 MG/1
TABLET, FILM COATED ORAL
Qty: 180 TABLET | Refills: 0 | Status: SHIPPED | OUTPATIENT
Start: 2020-11-24

## 2020-12-17 DIAGNOSIS — R06.02 SOB (SHORTNESS OF BREATH) ON EXERTION: ICD-10-CM

## 2020-12-17 DIAGNOSIS — J44.9 CHRONIC OBSTRUCTIVE PULMONARY DISEASE, UNSPECIFIED COPD TYPE (HCC): ICD-10-CM

## 2020-12-17 DIAGNOSIS — R52 GENERALIZED PAIN: ICD-10-CM

## 2020-12-18 RX ORDER — TIOTROPIUM BROMIDE 18 UG/1
18 CAPSULE ORAL; RESPIRATORY (INHALATION) DAILY
Qty: 30 CAPSULE | Refills: 0 | Status: SHIPPED | OUTPATIENT
Start: 2020-12-18

## 2020-12-18 RX ORDER — BUDESONIDE AND FORMOTEROL FUMARATE DIHYDRATE 160; 4.5 UG/1; UG/1
2 AEROSOL RESPIRATORY (INHALATION) DAILY
Qty: 3 INHALER | Refills: 0 | Status: SHIPPED | OUTPATIENT
Start: 2020-12-18

## 2020-12-18 RX ORDER — ASPIRIN 81 MG/1
81 TABLET ORAL DAILY
Qty: 30 TABLET | Refills: 0 | Status: SHIPPED | OUTPATIENT
Start: 2020-12-18

## 2020-12-18 RX ORDER — ALBUTEROL SULFATE 90 UG/1
2 AEROSOL, METERED RESPIRATORY (INHALATION) EVERY 4 HOURS PRN
Qty: 1 INHALER | Refills: 1 | Status: SHIPPED | OUTPATIENT
Start: 2020-12-18

## 2021-01-27 ENCOUNTER — HOSPITAL ENCOUNTER (OUTPATIENT)
Dept: PERIOP | Facility: HOSPITAL | Age: 61
Setting detail: OUTPATIENT SURGERY
Discharge: HOME | End: 2021-01-27
Attending: INTERNAL MEDICINE

## 2022-11-04 ENCOUNTER — TELEPHONE (OUTPATIENT)
Dept: FAMILY MEDICINE CLINIC | Facility: CLINIC | Age: 62
End: 2022-11-04

## 2022-11-04 NOTE — TELEPHONE ENCOUNTER
Tried calling patient to see if we are still pcp  If so need to schedule awv, if not need to update pcp  Both numbers do not work

## (undated) DEVICE — LUBRICANT SURGILUBE TUBE 4 OZ  FLIP TOP

## (undated) DEVICE — 2000CC GUARDIAN II: Brand: GUARDIAN

## (undated) DEVICE — TUBING SUCTION 5MM X 12 FT

## (undated) DEVICE — FORCEPS BIOPSY ALLIGATOR JAW W/NEEDLE 2.8MM